# Patient Record
Sex: FEMALE | Race: WHITE | NOT HISPANIC OR LATINO | Employment: UNEMPLOYED | URBAN - METROPOLITAN AREA
[De-identification: names, ages, dates, MRNs, and addresses within clinical notes are randomized per-mention and may not be internally consistent; named-entity substitution may affect disease eponyms.]

---

## 2018-05-14 ENCOUNTER — OFFICE VISIT (OUTPATIENT)
Dept: URGENT CARE | Facility: CLINIC | Age: 43
End: 2018-05-14
Payer: COMMERCIAL

## 2018-05-14 ENCOUNTER — HOSPITAL ENCOUNTER (EMERGENCY)
Facility: HOSPITAL | Age: 43
Discharge: HOME | End: 2018-05-14
Attending: EMERGENCY MEDICINE
Payer: COMMERCIAL

## 2018-05-14 ENCOUNTER — APPOINTMENT (EMERGENCY)
Dept: RADIOLOGY | Facility: HOSPITAL | Age: 43
End: 2018-05-14
Payer: COMMERCIAL

## 2018-05-14 VITALS
HEART RATE: 61 BPM | TEMPERATURE: 97.9 F | RESPIRATION RATE: 18 BRPM | WEIGHT: 185 LBS | OXYGEN SATURATION: 99 % | BODY MASS INDEX: 28.04 KG/M2 | SYSTOLIC BLOOD PRESSURE: 102 MMHG | DIASTOLIC BLOOD PRESSURE: 61 MMHG | HEIGHT: 68 IN

## 2018-05-14 VITALS
HEIGHT: 68 IN | RESPIRATION RATE: 16 BRPM | OXYGEN SATURATION: 100 % | HEART RATE: 72 BPM | TEMPERATURE: 98.1 F | SYSTOLIC BLOOD PRESSURE: 106 MMHG | WEIGHT: 187 LBS | DIASTOLIC BLOOD PRESSURE: 65 MMHG | BODY MASS INDEX: 28.34 KG/M2

## 2018-05-14 DIAGNOSIS — R07.9 CHEST PAIN: Primary | ICD-10-CM

## 2018-05-14 DIAGNOSIS — R07.9 CHEST PAIN, UNSPECIFIED TYPE: Primary | ICD-10-CM

## 2018-05-14 LAB
ANION GAP SERPL CALCULATED.3IONS-SCNC: 9 MMOL/L (ref 4–13)
BASOPHILS # BLD AUTO: 0.02 THOUSANDS/ΜL (ref 0–0.1)
BASOPHILS NFR BLD AUTO: 1 % (ref 0–1)
BUN SERPL-MCNC: 17 MG/DL (ref 5–25)
CALCIUM SERPL-MCNC: 8.4 MG/DL (ref 8.3–10.1)
CHLORIDE SERPL-SCNC: 107 MMOL/L (ref 100–108)
CO2 SERPL-SCNC: 27 MMOL/L (ref 21–32)
CREAT SERPL-MCNC: 0.71 MG/DL (ref 0.6–1.3)
EOSINOPHIL # BLD AUTO: 0.09 THOUSAND/ΜL (ref 0–0.61)
EOSINOPHIL NFR BLD AUTO: 2 % (ref 0–6)
ERYTHROCYTE [DISTWIDTH] IN BLOOD BY AUTOMATED COUNT: 12.8 % (ref 11.6–15.1)
EXT PREG TEST URINE: NEGATIVE
GFR SERPL CREATININE-BSD FRML MDRD: 105 ML/MIN/1.73SQ M
GLUCOSE SERPL-MCNC: 86 MG/DL (ref 65–140)
HCT VFR BLD AUTO: 36 % (ref 34.8–46.1)
HGB BLD-MCNC: 11.8 G/DL (ref 11.5–15.4)
LYMPHOCYTES # BLD AUTO: 1.62 THOUSANDS/ΜL (ref 0.6–4.47)
LYMPHOCYTES NFR BLD AUTO: 39 % (ref 14–44)
MAGNESIUM SERPL-MCNC: 1.7 MG/DL (ref 1.6–2.6)
MCH RBC QN AUTO: 31.7 PG (ref 26.8–34.3)
MCHC RBC AUTO-ENTMCNC: 32.8 G/DL (ref 31.4–37.4)
MCV RBC AUTO: 97 FL (ref 82–98)
MONOCYTES # BLD AUTO: 0.33 THOUSAND/ΜL (ref 0.17–1.22)
MONOCYTES NFR BLD AUTO: 8 % (ref 4–12)
NEUTROPHILS # BLD AUTO: 2.09 THOUSANDS/ΜL (ref 1.85–7.62)
NEUTS SEG NFR BLD AUTO: 50 % (ref 43–75)
PLATELET # BLD AUTO: 183 THOUSANDS/UL (ref 149–390)
PMV BLD AUTO: 10.9 FL (ref 8.9–12.7)
POTASSIUM SERPL-SCNC: 3.5 MMOL/L (ref 3.5–5.3)
RBC # BLD AUTO: 3.72 MILLION/UL (ref 3.81–5.12)
SODIUM SERPL-SCNC: 143 MMOL/L (ref 136–145)
TROPONIN I SERPL-MCNC: <0.02 NG/ML
TROPONIN I SERPL-MCNC: <0.02 NG/ML
WBC # BLD AUTO: 4.15 THOUSAND/UL (ref 4.31–10.16)

## 2018-05-14 PROCEDURE — 71045 X-RAY EXAM CHEST 1 VIEW: CPT

## 2018-05-14 PROCEDURE — 36415 COLL VENOUS BLD VENIPUNCTURE: CPT | Performed by: EMERGENCY MEDICINE

## 2018-05-14 PROCEDURE — 93005 ELECTROCARDIOGRAM TRACING: CPT

## 2018-05-14 PROCEDURE — 80048 BASIC METABOLIC PNL TOTAL CA: CPT | Performed by: EMERGENCY MEDICINE

## 2018-05-14 PROCEDURE — 85025 COMPLETE CBC W/AUTO DIFF WBC: CPT | Performed by: EMERGENCY MEDICINE

## 2018-05-14 PROCEDURE — 83735 ASSAY OF MAGNESIUM: CPT | Performed by: EMERGENCY MEDICINE

## 2018-05-14 PROCEDURE — 99203 OFFICE O/P NEW LOW 30 MIN: CPT

## 2018-05-14 PROCEDURE — 99285 EMERGENCY DEPT VISIT HI MDM: CPT

## 2018-05-14 PROCEDURE — 84484 ASSAY OF TROPONIN QUANT: CPT | Performed by: EMERGENCY MEDICINE

## 2018-05-14 PROCEDURE — 81025 URINE PREGNANCY TEST: CPT | Performed by: EMERGENCY MEDICINE

## 2018-05-14 RX ORDER — ALBUTEROL SULFATE 2.5 MG/3ML
2.5 SOLUTION RESPIRATORY (INHALATION) EVERY 6 HOURS PRN
COMMUNITY

## 2018-05-14 RX ORDER — 0.9 % SODIUM CHLORIDE 0.9 %
3 VIAL (ML) INJECTION AS NEEDED
Status: DISCONTINUED | OUTPATIENT
Start: 2018-05-14 | End: 2018-05-14 | Stop reason: HOSPADM

## 2018-05-14 NOTE — ED PROVIDER NOTES
History  Chief Complaint   Patient presents with    Chest Pain     Pt c/o chest pain, SOB and left arm pain that started around 0830 this morning  43 yo F hx renal disorder, bariatric surgery, anxiety presenting with chest pain  Patient was getting her kids ready for school this morning when she felt a pressure in her central chest with pain radiating down her right arm  Also reports feeling short of breath and light headed with some blurry vision  Patient rates CP at 6/10 at its worst and now is largely resolved  She did not take anything for the pain  States she has never had similar episode in the past  Denies any recent heavy lifting or illness  History provided by:  Patient   used: No    Chest Pain   Pain location:  Substernal area  Pain quality: pressure    Pain radiates to:  R arm  Pain radiates to the back: no    Pain severity:  Mild  Onset quality:  Sudden  Timing:  Constant  Progression:  Partially resolved  Chronicity:  New  Context comment:  Started while she was getting her kids ready for school this morning  Relieved by:  Nothing  Worsened by:  Nothing tried  Ineffective treatments:  None tried  Associated symptoms: dizziness, nausea and shortness of breath    Associated symptoms: no abdominal pain, no altered mental status, no back pain, no cough, no fever, no headache, no numbness and not vomiting    Risk factors: surgery    Risk factors: no high cholesterol and no hypertension  Male sex: bariatric  Prior to Admission Medications   Prescriptions Last Dose Informant Patient Reported? Taking?    albuterol (2 5 mg/3 mL) 0 083 % nebulizer solution   Yes Yes   Sig: Take 2 5 mg by nebulization every 6 (six) hours as needed for wheezing      Facility-Administered Medications: None       Past Medical History:   Diagnosis Date    Asthma     GERD (gastroesophageal reflux disease) anxiety    Renal disorder        Past Surgical History:   Procedure Laterality Date    BARIATRIC SURGERY      CHOLECYSTECTOMY      OOPHORECTOMY         History reviewed  No pertinent family history  I have reviewed and agree with the history as documented  Social History   Substance Use Topics    Smoking status: Never Smoker    Smokeless tobacco: Never Used    Alcohol use No        Review of Systems   Constitutional: Negative for chills and fever  HENT: Negative  Eyes: Positive for visual disturbance  Respiratory: Positive for shortness of breath  Negative for cough  Cardiovascular: Positive for chest pain  Gastrointestinal: Positive for nausea  Negative for abdominal pain, diarrhea and vomiting  Genitourinary: Negative  Musculoskeletal: Negative for back pain  Skin: Negative for rash and wound  Neurological: Positive for dizziness  Negative for syncope, numbness and headaches  All other systems reviewed and are negative  Physical Exam  ED Triage Vitals [05/14/18 1114]   Temperature Pulse Respirations Blood Pressure SpO2   97 9 °F (36 6 °C) 64 18 107/60 100 %      Temp src Heart Rate Source Patient Position - Orthostatic VS BP Location FiO2 (%)   -- Monitor Lying Right arm --      Pain Score       1           Orthostatic Vital Signs  Vitals:    05/14/18 1114 05/14/18 1300   BP: 107/60 102/61   Pulse: 64 61   Patient Position - Orthostatic VS: Lying        Physical Exam   Constitutional: She is oriented to person, place, and time  She appears well-developed and well-nourished  No distress  HENT:   Head: Normocephalic and atraumatic  Mouth/Throat: Oropharynx is clear and moist    Eyes: EOM are normal  Pupils are equal, round, and reactive to light  Neck: Normal range of motion  Cardiovascular: Normal rate, regular rhythm and normal heart sounds  No murmur heard  Pulmonary/Chest: Effort normal and breath sounds normal  No respiratory distress  She has no wheezes  She exhibits no tenderness  Abdominal: Soft   Bowel sounds are normal  There is no tenderness  Musculoskeletal: Normal range of motion  She exhibits no edema  Neurological: She is alert and oriented to person, place, and time  Skin: Skin is warm and dry  No rash noted  She is not diaphoretic  Psychiatric: She has a normal mood and affect  Nursing note and vitals reviewed  ED Medications  Medications - No data to display    Diagnostic Studies  Results Reviewed     Procedure Component Value Units Date/Time    Troponin I [[id number]]  (Normal) Collected:  05/14/18 1412    Lab Status:  Final result Specimen:  Blood from Arm, Left Updated:  05/14/18 1443     Troponin I <0 02 ng/mL     Narrative:         [employer] Chemistry analyzer 99% cutoff is > 0 04 ng/mL in network labs    o cTnI 99% cutoff is useful only when applied to patients in the clinical setting of myocardial ischemia  o cTnI 99% cutoff should be interpreted in the context of clinical history, ECG findings and possibly cardiac imaging to establish correct diagnosis  o cTnI 99% cutoff may be suggestive but clearly not indicative of a coronary event without the clinical setting of myocardial ischemia  Troponin I [[id number]]  (Normal) Collected:  05/14/18 1221    Lab Status:  Final result Specimen:  Blood from Arm, Left Updated:  05/14/18 1316     Troponin I <0 02 ng/mL     Narrative:         [employer] Chemistry analyzer 99% cutoff is > 0 04 ng/mL in network labs    o cTnI 99% cutoff is useful only when applied to patients in the clinical setting of myocardial ischemia  o cTnI 99% cutoff should be interpreted in the context of clinical history, ECG findings and possibly cardiac imaging to establish correct diagnosis  o cTnI 99% cutoff may be suggestive but clearly not indicative of a coronary event without the clinical setting of myocardial ischemia      Basic metabolic panel [[id number]] Collected:  05/14/18 1221    Lab Status:  Final result Specimen:  Blood from Arm, Left Updated:  05/14/18 1306     Sodium 143 mmol/L      Potassium 3 5 mmol/L      Chloride 107 mmol/L      CO2 27 mmol/L      Anion Gap 9 mmol/L      BUN 17 mg/dL      Creatinine 0 71 mg/dL      Glucose 86 mg/dL      Calcium 8 4 mg/dL      eGFR 105 ml/min/1 73sq m     Narrative:         National Kidney Disease Education Program recommendations are as follows:  GFR calculation is accurate only with a steady state creatinine  Chronic Kidney disease less than 60 ml/min/1 73 sq  meters  Kidney failure less than 15 ml/min/1 73 sq  meters  Magnesium [[id number]]  (Normal) Collected:  05/14/18 1221    Lab Status:  Final result Specimen:  Blood from Arm, Left Updated:  05/14/18 1306     Magnesium 1 7 mg/dL     CBC and differential [[id number]]  (Abnormal) Collected:  05/14/18 1221    Lab Status:  Final result Specimen:  Blood from Arm, Left Updated:  05/14/18 1258     WBC 4 15 (L) Thousand/uL      RBC 3 72 (L) Million/uL      Hemoglobin 11 8 g/dL      Hematocrit 36 0 %      MCV 97 fL      MCH 31 7 pg      MCHC 32 8 g/dL      RDW 12 8 %      MPV 10 9 fL      Platelets 542 Thousands/uL      Neutrophils Relative 50 %      Lymphocytes Relative 39 %      Monocytes Relative 8 %      Eosinophils Relative 2 %      Basophils Relative 1 %      Neutrophils Absolute 2 09 Thousands/µL      Lymphocytes Absolute 1 62 Thousands/µL      Monocytes Absolute 0 33 Thousand/µL      Eosinophils Absolute 0 09 Thousand/µL      Basophils Absolute 0 02 Thousands/µL     POCT pregnancy, urine [[id number]]  (Normal) Resulted:  05/14/18 1207    Lab Status:  Final result Updated:  05/14/18 1207     EXT PREG TEST UR (Ref: Negative) NEGATIVE                 XR chest portable   ED Interpretation by George Hart DO (05/14 1238)   No acute abnl      Final Result by Padmini Florian MD (05/14 1238)      No acute cardiopulmonary disease              Workstation performed: [id number]                    Procedures  ECG 12 Lead Documentation  Date/Time: 5/14/2018 11:20 AM  Performed by: Pina Fulton by: Belle Haynes Indications / Diagnosis:  Chest pain  ECG reviewed by me, the ED Provider: yes    Patient location:  ED  Previous ECG:     Previous ECG:  Unavailable  Interpretation:     Interpretation: normal    Rate:     ECG rate:  57    ECG rate assessment: bradycardic    Rhythm:     Rhythm: sinus bradycardia    Ectopy:     Ectopy: none    QRS:     QRS axis:  Normal    QRS intervals:  Normal  Conduction:     Conduction: normal    ST segments:     ST segments:  Normal  T waves:     T waves: normal             Phone Contacts  ED Phone Contact    ED Course  ED Course as of May 14 2103   Mon May 14, 2018   1213 Patient with sternal chest pain pressure and right arm pain 20 minute duration  Associated nausea    Has had low potassium in the past           HEART Risk Score      Most Recent Value   History  0 Filed at: 05/14/2018 1145   ECG  0 Filed at: 05/14/2018 1145   Age  0 Filed at: 05/14/2018 1145   Risk Factors  0 Filed at: 05/14/2018 1145   Troponin  0 Filed at: 05/14/2018 1145   Heart Score Risk Calculator   History  0 Filed at: 05/14/2018 1145   ECG  0 Filed at: 05/14/2018 1145   Age  0 Filed at: 05/14/2018 1145   Risk Factors  0 Filed at: 05/14/2018 1145   Troponin  0 Filed at: 05/14/2018 1145   HEART Score  0 Filed at: 05/14/2018 1145   HEART Score  0 Filed at: 05/14/2018 1145                            [facility]  Number of Diagnoses or Management Options  Chest pain: new and requires workup     Amount and/or Complexity of Data Reviewed  Clinical lab tests: ordered and reviewed  Tests in the radiology section of CPT®: ordered and reviewed    Patient Progress  Patient progress: improved    CritCare Time    Disposition  Final diagnoses:   Chest pain     Time reflects when diagnosis was documented in both MDM as applicable and the Disposition within this note     Time User Action Codes Description Comment    5/14/2018  2:54 PM Tauna Friendly Add [R07 9] Chest pain       ED Disposition     ED Disposition Condition Comment Discharge  [address] [facility] discharge to home/self care  Condition at discharge: Good        Follow-up Information     Follow up With Specialties Details Why Contact Info    Obie Lowry MD   As needed [place]  [address]  [facility] [address] [phone]          Discharge Medication List as of 5/14/2018  2:55 PM      CONTINUE these medications which have NOT CHANGED    Details   albuterol (2 5 mg/3 mL) 0 083 % nebulizer solution Take 2 5 mg by nebulization every 6 (six) hours as needed for wheezing, Historical Med             Outpatient Discharge Orders  Stress test only, exercise   Standing Status: Future  Standing Exp   Date: 05/14/22         ED Provider  Electronically Signed by           Vineet Nelson DO  05/14/18 2108

## 2018-05-14 NOTE — PROGRESS NOTES
[facility]        NAME: Josey Miller is a 44 y o  female  : 1975    MRN: [id number]  DATE: May 14, 2018  TIME: 11:35 AM    Assessment and Plan   Chest pain, unspecified type [R07 9]  1  Chest pain, unspecified type           Patient Instructions       Follow up with PCP in 3-5 days  Proceed to  ER if symptoms worsen  Chief Complaint     Chief Complaint   Patient presents with    Chest Pain     8:30 this morning chest tightness, pressure on her chest, right arm pain  lasted 15 minutes         History of Present Illness       Pt reports chest with radiation to right arm that started at 815 am and lasted at 10/10 for aprox 10 mins  Continues to have dull achy pain with rad to right arm at 1-2 /10 now   Denies sob, diaphoresis or n/v at this time  States it felt like gas but wasn't  Hx of gerd and anxiety but states it did not feel like either of those  Review of Systems   Review of Systems   Constitutional: Negative  Negative for diaphoresis, fatigue and fever  HENT: Negative  Negative for congestion  Eyes: Negative  Negative for discharge and redness  Respiratory: Negative  Negative for shortness of breath and wheezing  Cardiovascular: Positive for chest pain  Gastrointestinal: Negative  Negative for abdominal pain  Endocrine: Negative  Negative for cold intolerance  Genitourinary: Negative  Musculoskeletal: Negative  Negative for back pain and gait problem  Skin: Negative  Negative for color change  Neurological: Negative  Negative for dizziness and weakness  Psychiatric/Behavioral: Negative  Negative for agitation, behavioral problems and confusion           Current Medications       Current Outpatient Prescriptions:     albuterol (2 5 mg/3 mL) 0 083 % nebulizer solution, Take 2 5 mg by nebulization every 6 (six) hours as needed for wheezing, Disp: , Rfl:     Current Allergies     Allergies as of 2018 - Reviewed 2018   Allergen Reaction Noted    Adhesive [medical tape] Blisters 05/14/2018    Morphine Chest Pain 05/14/2018    Nsaids  05/14/2018    Sulfacetamide Hives 05/14/2018    Lovenox [enoxaparin] Rash 05/14/2018            The following portions of the patient's history were reviewed and updated as appropriate: allergies, current medications, past family history, past medical history, past social history, past surgical history and problem list      Past Medical History:   Diagnosis Date    Asthma     Renal disorder        Past Surgical History:   Procedure Laterality Date    BARIATRIC SURGERY      CHOLECYSTECTOMY      OOPHORECTOMY         No family history on file  Medications have been verified  Objective   /65   Pulse 72   Temp 98 1 °F (36 7 °C)   Resp 16   Ht 5' 8" (1 727 m)   Wt 84 8 kg (187 lb)   SpO2 100%   BMI 28 43 kg/m²        Physical Exam     Physical Exam   Constitutional: She is oriented to person, place, and time  She appears well-developed and well-nourished  HENT:   Head: Normocephalic and atraumatic  Eyes: Conjunctivae and EOM are normal  Pupils are equal, round, and reactive to light  Right eye exhibits no discharge  Left eye exhibits no discharge  Neck: Normal range of motion  Neck supple  No thyromegaly present  Cardiovascular: Normal rate, regular rhythm and normal heart sounds  Exam reveals no gallop and no friction rub  No murmur heard  Pt reports m id sternal chest pain with radiaiton to right arm that continues at this time  1-2 /10  Constant  Pulmonary/Chest: Effort normal and breath sounds normal  No respiratory distress  Abdominal: Soft  Bowel sounds are normal  She exhibits no distension and no mass  There is no tenderness  There is no rebound and no guarding  Musculoskeletal: Normal range of motion  Neurological: She is alert and oriented to person, place, and time  Skin: Skin is warm  She is not diaphoretic  No pallor     Psychiatric: She has a normal mood and affect  Her behavior is normal  Judgment and thought content normal    Nursing note and vitals reviewed

## 2018-05-14 NOTE — PATIENT INSTRUCTIONS
Chest Pain   WHAT YOU NEED TO KNOW:   Pt to go directly to the er for evaluation  Chest pain can be caused by a range of conditions, from not serious to life-threatening  Chest pain can be a symptom of a digestive problem, such as acid reflux or a stomach ulcer  An anxiety attack or a strong emotion, such as anger, can also cause chest pain  Infection, inflammation, or a fracture in the bones or cartilage in your chest can cause pain or discomfort  Sometimes chest pain or pressure is caused by poor blood flow to your heart (angina)  Chest pain may also be caused by life-threatening conditions such as a heart attack or blood clot in your lungs  DISCHARGE INSTRUCTIONS:   Call 911 if:   · You have any of the following signs of a heart attack:      ¨ Squeezing, pressure, or pain in your chest that lasts longer than 5 minutes or returns    ¨ Discomfort or pain in your back, neck, jaw, stomach, or arm     ¨ Trouble breathing    ¨ Nausea or vomiting    ¨ Lightheadedness or a sudden cold sweat, especially with chest pain or trouble breathing    Return to the emergency department if:   · You have chest discomfort that gets worse, even with medicine  · You cough or vomit blood  · Your bowel movements are black or bloody  · You cannot stop vomiting, or it hurts to swallow  Contact your healthcare provider if:   · You have questions or concerns about your condition or care  Medicines:   · Medicines  may be given to treat the cause of your chest pain  Examples include pain medicine, anxiety medicine, or medicines to increase blood flow to your heart  · Do not take certain medicines without asking your healthcare provider first   These include NSAIDs, herbal or vitamin supplements, or hormones (estrogen or progestin)  · Take your medicine as directed  Contact your healthcare provider if you think your medicine is not helping or if you have side effects   Tell him or her if you are allergic to any medicine  Keep a list of the medicines, vitamins, and herbs you take  Include the amounts, and when and why you take them  Bring the list or the pill bottles to follow-up visits  Carry your medicine list with you in case of an emergency  Follow up with your healthcare provider within 72 hours, or as directed: You may need to return for more tests to find the cause of your chest pain  You may be referred to a specialist, such as a cardiologist or gastroenterologist  Write down your questions so you remember to ask them during your visits  Healthy living tips: The following are general healthy guidelines  If your chest pain is caused by a heart problem, your healthcare provider will give you specific guidelines to follow  · Do not smoke  Nicotine and other chemicals in cigarettes and cigars can cause lung and heart damage  Ask your healthcare provider for information if you currently smoke and need help to quit  E-cigarettes or smokeless tobacco still contain nicotine  Talk to your healthcare provider before you use these products  · Eat a variety of healthy, low-fat foods  Healthy foods include fruits, vegetables, whole-grain breads, low-fat dairy products, beans, lean meats, and fish  Ask for more information about a heart healthy diet  · Ask about activity  Your healthcare provider will tell you which activities to limit or avoid  Ask when you can drive, return to work, and have sex  Ask about the best exercise plan for you  · Maintain a healthy weight  Ask your healthcare provider how much you should weigh  Ask him or her to help you create a weight loss plan if you are overweight  · Get the flu and pneumonia vaccines  All adults should get the influenza (flu) vaccine  Get it every year as soon as it becomes available  The pneumococcal vaccine is given to adults aged 72 years or older  The vaccine is given every 5 years to prevent pneumococcal disease, such as pneumonia    © 2017 [employer] [address] is for End User's use only and may not be sold, redistributed or otherwise used for commercial purposes  All illustrations and images included in CareNotes® are the copyrighted property of [employer] M [employer] Inc  or [employer]  The above information is an  only  It is not intended as medical advice for individual conditions or treatments  Talk to your doctor, nurse or pharmacist before following any medical regimen to see if it is safe and effective for you

## 2018-05-14 NOTE — DISCHARGE INSTRUCTIONS
Chest Pain   WHAT YOU NEED TO KNOW:   Chest pain can be caused by a range of conditions, from not serious to life-threatening  Chest pain can be a symptom of a digestive problem, such as acid reflux or a stomach ulcer  An anxiety attack or a strong emotion, such as anger, can also cause chest pain  Infection, inflammation, or a fracture in the bones or cartilage in your chest can cause pain or discomfort  Sometimes chest pain or pressure is caused by poor blood flow to your heart (angina)  Chest pain may also be caused by life-threatening conditions such as a heart attack or blood clot in your lungs  DISCHARGE INSTRUCTIONS:   Call 911 if:   · You have any of the following signs of a heart attack:      ¨ Squeezing, pressure, or pain in your chest that lasts longer than 5 minutes or returns    ¨ Discomfort or pain in your back, neck, jaw, stomach, or arm     ¨ Trouble breathing    ¨ Nausea or vomiting    ¨ Lightheadedness or a sudden cold sweat, especially with chest pain or trouble breathing    Return to the emergency department if:   · You have chest discomfort that gets worse, even with medicine  · You cough or vomit blood  · Your bowel movements are black or bloody  · You cannot stop vomiting, or it hurts to swallow  Contact your healthcare provider if:   · You have questions or concerns about your condition or care  Medicines:   · Medicines  may be given to treat the cause of your chest pain  Examples include pain medicine, anxiety medicine, or medicines to increase blood flow to your heart  · Do not take certain medicines without asking your healthcare provider first   These include NSAIDs, herbal or vitamin supplements, or hormones (estrogen or progestin)  · Take your medicine as directed  Contact your healthcare provider if you think your medicine is not helping or if you have side effects  Tell him or her if you are allergic to any medicine   Keep a list of the medicines, vitamins, and herbs you take  Include the amounts, and when and why you take them  Bring the list or the pill bottles to follow-up visits  Carry your medicine list with you in case of an emergency  Follow up with your healthcare provider within 72 hours, or as directed: You may need to return for more tests to find the cause of your chest pain  You may be referred to a specialist, such as a cardiologist or gastroenterologist  Write down your questions so you remember to ask them during your visits  Healthy living tips: The following are general healthy guidelines  If your chest pain is caused by a heart problem, your healthcare provider will give you specific guidelines to follow  · Do not smoke  Nicotine and other chemicals in cigarettes and cigars can cause lung and heart damage  Ask your healthcare provider for information if you currently smoke and need help to quit  E-cigarettes or smokeless tobacco still contain nicotine  Talk to your healthcare provider before you use these products  · Eat a variety of healthy, low-fat foods  Healthy foods include fruits, vegetables, whole-grain breads, low-fat dairy products, beans, lean meats, and fish  Ask for more information about a heart healthy diet  · Ask about activity  Your healthcare provider will tell you which activities to limit or avoid  Ask when you can drive, return to work, and have sex  Ask about the best exercise plan for you  · Maintain a healthy weight  Ask your healthcare provider how much you should weigh  Ask him or her to help you create a weight loss plan if you are overweight  · Get the flu and pneumonia vaccines  All adults should get the influenza (flu) vaccine  Get it every year as soon as it becomes available  The pneumococcal vaccine is given to adults aged 72 years or older  The vaccine is given every 5 years to prevent pneumococcal disease, such as pneumonia    © 2017 [employer] Information is for End User's use only and may not be sold, redistributed or otherwise used for commercial purposes  All illustrations and images included in CareNotes® are the copyrighted property of A D A M , Inc  or [employer]  The above information is an  only  It is not intended as medical advice for individual conditions or treatments  Talk to your doctor, nurse or pharmacist before following any medical regimen to see if it is safe and effective for you

## 2018-05-15 LAB
ATRIAL RATE: 57 BPM
ATRIAL RATE: 69 BPM
P AXIS: 44 DEGREES
P AXIS: 51 DEGREES
PR INTERVAL: 138 MS
PR INTERVAL: 146 MS
QRS AXIS: 32 DEGREES
QRS AXIS: 67 DEGREES
QRSD INTERVAL: 82 MS
QRSD INTERVAL: 84 MS
QT INTERVAL: 398 MS
QT INTERVAL: 400 MS
QTC INTERVAL: 387 MS
QTC INTERVAL: 428 MS
T WAVE AXIS: 16 DEGREES
T WAVE AXIS: 6 DEGREES
VENTRICULAR RATE: 57 BPM
VENTRICULAR RATE: 69 BPM

## 2018-05-15 PROCEDURE — 93010 ELECTROCARDIOGRAM REPORT: CPT | Performed by: INTERNAL MEDICINE

## 2020-09-07 ENCOUNTER — APPOINTMENT (OUTPATIENT)
Dept: RADIOLOGY | Facility: CLINIC | Age: 45
End: 2020-09-07
Payer: COMMERCIAL

## 2020-09-07 ENCOUNTER — OFFICE VISIT (OUTPATIENT)
Dept: URGENT CARE | Facility: CLINIC | Age: 45
End: 2020-09-07
Payer: COMMERCIAL

## 2020-09-07 VITALS
OXYGEN SATURATION: 100 % | SYSTOLIC BLOOD PRESSURE: 113 MMHG | RESPIRATION RATE: 16 BRPM | DIASTOLIC BLOOD PRESSURE: 68 MMHG | BODY MASS INDEX: 27.13 KG/M2 | HEART RATE: 80 BPM | HEIGHT: 69 IN | WEIGHT: 183.2 LBS

## 2020-09-07 DIAGNOSIS — S99.921A FOOT INJURY, RIGHT, INITIAL ENCOUNTER: ICD-10-CM

## 2020-09-07 DIAGNOSIS — M79.671 RIGHT FOOT PAIN: Primary | ICD-10-CM

## 2020-09-07 PROCEDURE — 99213 OFFICE O/P EST LOW 20 MIN: CPT | Performed by: PHYSICIAN ASSISTANT

## 2020-09-07 PROCEDURE — 73630 X-RAY EXAM OF FOOT: CPT

## 2020-09-07 NOTE — PATIENT INSTRUCTIONS
Motrin Tylenol as needed for pain  Follow-up with Podiatry  Follow up with PCP in 3-5 days  Proceed to  ER if symptoms worsen  Metatarsalgia   AMBULATORY CARE:   Metatarsalgia  is pain in the ball of your foot, near your second, third, and fourth toes  Common signs and symptoms of metatarsalgia:  Symptoms usually develop over time, but you may have sudden pain from an injury  You may have any of the following:  · Pain at the ball of your foot or near your toes that gets worse when you walk or stand, especially on hard surfaces    · Pain during exercises such as running    · Sharp or shooting pain in your toes that may get worse when you flex your toes    · Tingling or numbness in your toes    · Feeling like you are walking over rocks, or that you have a bruise    · A change in the way you walk because you try to avoid putting pressure on the ball of your foot  Contact your healthcare provider if:   · You develop knee, back, or hip pain  · You have more pain or redness in the foot  · You have questions or concerns about your condition or care  Treatment:  The cause of your metatarsalgia will be treated, if possible  You may also need any of the following:  · NSAIDs , such as ibuprofen, help decrease swelling, pain, and fever  This medicine is available with or without a doctor's order  NSAIDs can cause stomach bleeding or kidney problems in certain people  If you take blood thinner medicine, always ask if NSAIDs are safe for you  Always read the medicine label and follow directions  Do not give these medicines to children under 10months of age without direction from your child's healthcare provider  · Ultrasound  may be used to relieve your pain  Sound waves from the ultrasound can help send heat deeper into your tissues  · A steroid injection  may help decrease inflammation  · Surgery  may be needed if other treatments do not work  Surgery is used to align the bones near your toes   You may also need surgery to fix a problem such as hammertoe  Manage or prevent metatarsalgia:   · Rest your foot  If you play sports, you may not be able to do weight-bearing exercises  Examples include swimming and bike riding  Ask your healthcare provider which exercises are safe for you  · Apply ice as directed  Ice helps reduce pain and swelling  Use an ice pack, or put crushed ice in a plastic bag  Cover the pack or bag with a towel before you apply it to your foot  Apply ice for 15 to 20 minutes every hour, or as directed  · Use a cane or crutch if directed  These devices may help take pressure off your foot while it heals  · Wear proper shoes  Do not wear shoes that are narrow or tight  You may need to wear shoes that are wider than you usually wear  Choose shoes that do not have a raised heel  Shock-absorbing shoes can help prevent injury  These shoes will have extra support under your feet and toes  You can also add shoe cushions inside your shoes or to the bottoms of your feet, near your toes  The cushions may provide more support and make walking or standing more comfortable  Arch supports may help take pressure off your toes  · Reach or maintain a healthy weight  Extra weight can put pressure on your feet  Talk to your healthcare provider about a healthy weight for you  Your provider can help you create a safe weight loss plan if you are overweight  · Go to physical therapy if directed  A physical therapist can help improve your strength and range of motion  The therapist can also help you improve the way you walk to prevent metatarsalgia from happening again  Your therapist can also teach you exercises to help relieve your pain  Follow up with your healthcare provider as directed:  Write down your questions so you remember to ask them during your visits     © 2017 [employer] Information is for End User's use only and may not be sold, redistributed or otherwise used for commercial purposes  All illustrations and images included in CareNotes® are the copyrighted property of [employer]  or [employer]  The above information is an  only  It is not intended as medical advice for individual conditions or treatments  Talk to your doctor, nurse or pharmacist before following any medical regimen to see if it is safe and effective for you

## 2020-09-07 NOTE — PROGRESS NOTES
[facility]        NAME: Fernando Cook is a 41 y o  female  : 1975    MRN: [id number]  DATE: 2020  TIME: 12:50 PM    Assessment and Plan   Right foot pain [M79 671]  1  Right foot pain  XR foot 3+ vw right    Ambulatory referral to Podiatry         Patient Instructions     Motrin Tylenol as needed for pain  Follow-up with Podiatry  Follow up with PCP in 3-5 days  Proceed to  ER if symptoms worsen  Chief Complaint     Chief Complaint   Patient presents with    Foot Pain     Pt fell through deck at home 2020  Was seen in  for other injuries  Foot pain progressively worsening pain, 2/10 @ rest, 10/10 wgt bearing  R lateral foot, lump present  History of Present Illness       60-year-old female presents with right foot pain  Patient reports an injury that occurred back and July this year from falling through a deck which she was seen at urgent care  Is having some pain then but progressively gotten worse over the past several months  Patient reports when she is weight-bearing it is 10/10 pain  When she is nonweightbearing to at 10 pain  Reports most the pain is on her right lateral foot area  Denies any numbness or tingling  Ankle Pain    The incident occurred more than 1 week ago  The incident occurred at home  The injury mechanism was a fall  The pain is present in the right foot  The quality of the pain is described as aching  The pain is moderate  The pain has been fluctuating since onset  Pertinent negatives include no inability to bear weight, loss of motion, loss of sensation, muscle weakness, numbness or tingling  She reports no foreign bodies present  Nothing aggravates the symptoms  She has tried nothing for the symptoms  The treatment provided no relief  Review of Systems   Review of Systems   Constitutional: Negative  HENT: Negative  Respiratory: Negative  Cardiovascular: Negative  Gastrointestinal: Negative      Musculoskeletal: Negative  Skin: Negative  Neurological: Negative  Negative for tingling and numbness  Current Medications       Current Outpatient Medications:     albuterol (2 5 mg/3 mL) 0 083 % nebulizer solution, Take 2 5 mg by nebulization every 6 (six) hours as needed for wheezing, Disp: , Rfl:     Current Allergies     Allergies as of 09/07/2020 - Reviewed 09/07/2020   Allergen Reaction Noted    Adhesive [medical tape] Blisters 05/14/2018    Morphine Chest Pain 05/14/2018    Nsaids  05/14/2018    Sulfacetamide Hives 05/14/2018    Lovenox [enoxaparin] Rash 05/14/2018            The following portions of the patient's history were reviewed and updated as appropriate: allergies, current medications, past family history, past medical history, past social history, past surgical history and problem list      Past Medical History:   Diagnosis Date    Asthma     GERD (gastroesophageal reflux disease) anxiety    Renal disorder        Past Surgical History:   Procedure Laterality Date    BARIATRIC SURGERY      CHOLECYSTECTOMY      ELBOW SURGERY      OOPHORECTOMY         History reviewed  No pertinent family history  Medications have been verified  Objective   /68   Pulse 80   Resp 16   Ht 5' 9" (1 753 m)   Wt 83 1 kg (183 lb 3 2 oz)   SpO2 100%   BMI 27 05 kg/m²        Physical Exam     Physical Exam  Vitals signs and nursing note reviewed  Constitutional:       General: She is not in acute distress  Appearance: She is well-developed  HENT:      Head: Normocephalic and atraumatic  Right Ear: External ear normal       Left Ear: External ear normal       Nose: Nose normal       Mouth/Throat:      Pharynx: No oropharyngeal exudate  Eyes:      General:         Right eye: No discharge  Left eye: No discharge  Conjunctiva/sclera: Conjunctivae normal    Neck:      Musculoskeletal: Normal range of motion and neck supple     Pulmonary:      Effort: Pulmonary effort is normal  No respiratory distress  Musculoskeletal: Normal range of motion  Right foot: Normal range of motion and normal capillary refill  Tenderness and bony tenderness present  No swelling, crepitus, deformity or laceration  Feet:    Skin:     General: Skin is warm and dry  Neurological:      Mental Status: She is alert and oriented to person, place, and time

## 2020-11-30 ENCOUNTER — NURSE TRIAGE (OUTPATIENT)
Dept: OTHER | Facility: OTHER | Age: 45
End: 2020-11-30

## 2021-05-01 ENCOUNTER — HOSPITAL ENCOUNTER (EMERGENCY)
Facility: HOSPITAL | Age: 46
Discharge: HOME | End: 2021-05-01
Attending: EMERGENCY MEDICINE | Admitting: EMERGENCY MEDICINE
Payer: COMMERCIAL

## 2021-05-01 ENCOUNTER — APPOINTMENT (EMERGENCY)
Dept: RADIOLOGY | Facility: HOSPITAL | Age: 46
End: 2021-05-01
Payer: COMMERCIAL

## 2021-05-01 VITALS
HEART RATE: 72 BPM | DIASTOLIC BLOOD PRESSURE: 58 MMHG | BODY MASS INDEX: 26.58 KG/M2 | RESPIRATION RATE: 16 BRPM | OXYGEN SATURATION: 99 % | TEMPERATURE: 97.1 F | SYSTOLIC BLOOD PRESSURE: 91 MMHG | WEIGHT: 180 LBS

## 2021-05-01 DIAGNOSIS — M79.672 LEFT FOOT PAIN: Primary | ICD-10-CM

## 2021-05-01 PROCEDURE — 96372 THER/PROPH/DIAG INJ SC/IM: CPT

## 2021-05-01 PROCEDURE — 73630 X-RAY EXAM OF FOOT: CPT

## 2021-05-01 PROCEDURE — 73610 X-RAY EXAM OF ANKLE: CPT

## 2021-05-01 PROCEDURE — 99283 EMERGENCY DEPT VISIT LOW MDM: CPT

## 2021-05-01 PROCEDURE — 99284 EMERGENCY DEPT VISIT MOD MDM: CPT | Performed by: EMERGENCY MEDICINE

## 2021-05-01 RX ORDER — KETOROLAC TROMETHAMINE 30 MG/ML
15 INJECTION, SOLUTION INTRAMUSCULAR; INTRAVENOUS ONCE
Status: COMPLETED | OUTPATIENT
Start: 2021-05-01 | End: 2021-05-01

## 2021-05-01 RX ORDER — ACETAMINOPHEN 325 MG/1
975 TABLET ORAL ONCE
Status: COMPLETED | OUTPATIENT
Start: 2021-05-01 | End: 2021-05-01

## 2021-05-01 RX ADMIN — ACETAMINOPHEN 975 MG: 325 TABLET, FILM COATED ORAL at 22:29

## 2021-05-01 RX ADMIN — KETOROLAC TROMETHAMINE 15 MG: 30 INJECTION, SOLUTION INTRAMUSCULAR at 22:29

## 2021-05-02 NOTE — ED PROVIDER NOTES
History  Chief Complaint   Patient presents with    Ankle Injury     pt states she was wearing heels, lost her footing and fell down the stairs, twisting her left ankle/foot     Patient is a 39 yo F who presents with left foot pain that happened almost immediately prior to arrival  Patient states that she was wearing heels, walking down the stairs, lost her footing, her left foot went forwards and the top of the foot hit the step  She did not fall completely as she held onto the railing  Did not strike head/loose consciousness  Since the fall, has been complaining of pain to the top and inner aspect of the left foot, constant, moderate in intensity, non-radiating, worse with movement and signifnicantly worse with attempt at ambulation  Denies numbness, tingling, weakness  Prior to Admission Medications   Prescriptions Last Dose Informant Patient Reported? Taking? albuterol (2 5 mg/3 mL) 0 083 % nebulizer solution   Yes No   Sig: Take 2 5 mg by nebulization every 6 (six) hours as needed for wheezing      Facility-Administered Medications: None       Past Medical History:   Diagnosis Date    Asthma     GERD (gastroesophageal reflux disease) anxiety    Renal disorder        Past Surgical History:   Procedure Laterality Date    BARIATRIC SURGERY      CHOLECYSTECTOMY      ELBOW SURGERY      OOPHORECTOMY         History reviewed  No pertinent family history  I have reviewed and agree with the history as documented  E-Cigarette/Vaping    E-Cigarette Use Never User      E-Cigarette/Vaping Substances     Social History     Tobacco Use    Smoking status: Never Smoker    Smokeless tobacco: Never Used   Substance Use Topics    Alcohol use: No    Drug use: No       Review of Systems   Musculoskeletal: Positive for gait problem  Negative for back pain, neck pain and neck stiffness  Foot pain     Skin: Negative for color change and wound  Neurological: Negative for weakness and numbness  Physical Exam  Physical Exam  Vitals signs and nursing note reviewed  Constitutional:       General: She is not in acute distress  Appearance: Normal appearance  She is not ill-appearing, toxic-appearing or diaphoretic  HENT:      Head: Normocephalic and atraumatic  Mouth/Throat:      Mouth: Mucous membranes are moist    Eyes:      Conjunctiva/sclera: Conjunctivae normal       Pupils: Pupils are equal, round, and reactive to light  Neck:      Musculoskeletal: Neck supple  Cardiovascular:      Rate and Rhythm: Normal rate and regular rhythm  Pulses: Normal pulses  Heart sounds: Normal heart sounds  No murmur  Pulmonary:      Effort: Pulmonary effort is normal  No respiratory distress  Breath sounds: Normal breath sounds  No stridor  No wheezing, rhonchi or rales  Chest:      Chest wall: No tenderness  Abdominal:      General: Bowel sounds are normal  There is no distension  Palpations: Abdomen is soft  Tenderness: There is no abdominal tenderness  There is no guarding or rebound  Musculoskeletal:      Left knee: Normal  She exhibits normal range of motion, no swelling, no effusion, no ecchymosis, no deformity, no laceration and no erythema  No tenderness found  Left ankle: Normal  She exhibits normal range of motion, no swelling, no ecchymosis, no deformity, no laceration and normal pulse  No tenderness  No lateral malleolus, no medial malleolus, no AITFL, no CF ligament, no posterior TFL, no head of 5th metatarsal and no proximal fibula tenderness found  Achilles tendon normal       Right lower leg: No edema  Left lower leg: No edema  Left foot: Decreased range of motion  Normal capillary refill  Tenderness present  No bony tenderness, swelling, crepitus, deformity or laceration  Feet:       Comments: No midline c-t-l-spine tenderness, step offs, deformities  Pelvis stable    Skin:     General: Skin is warm and dry     Neurological: General: No focal deficit present  Mental Status: She is alert and oriented to person, place, and time  Mental status is at baseline  Psychiatric:         Mood and Affect: Mood normal          Behavior: Behavior normal          Vital Signs  ED Triage Vitals   Temperature Pulse Respirations Blood Pressure SpO2   05/01/21 2135 05/01/21 2130 05/01/21 2130 05/01/21 2130 05/01/21 2130   (!) 97 1 °F (36 2 °C) 72 16 91/58 99 %      Temp src Heart Rate Source Patient Position - Orthostatic VS BP Location FiO2 (%)   -- 05/01/21 2130 05/01/21 2130 05/01/21 2130 --    Monitor Sitting Right arm       Pain Score       --                  Vitals:    05/01/21 2130   BP: 91/58   Pulse: 72   Patient Position - Orthostatic VS: Sitting         Visual Acuity      ED Medications  Medications   acetaminophen (TYLENOL) tablet 975 mg (975 mg Oral Given 5/1/21 2229)   ketorolac (TORADOL) injection 15 mg (15 mg Intramuscular Given 5/1/21 2229)       Diagnostic Studies  Results Reviewed     None                 XR foot 3+ views LEFT   Final Result by Damari Hammond DO (05/01 2241)      No acute osseous abnormality  If symptoms persist repeat x-ray in 7-10 days should be obtained to evaluate for occult fracture            Workstation performed: [id number]         XR ankle 3+ views LEFT   Final Result by Damari Hammond DO (05/01 2238)      No acute osseous abnormality  If symptoms persist repeat x-ray in 7-10 days should be obtained to evaluate for occult fracture            Workstation performed: [id number]                    Procedures  Procedures         ED Course                                           MDM  Number of Diagnoses or Management Options  Left foot pain:   Diagnosis management comments: Assessment and Plan:   39 yo F presenting with left foot pain consistent with contusion/sprain from trauma  XR negative for acute fracture   Discussed ace wrap, surgical shoe, crutches, weight bearing as tolerated, rest, ice, elevation, tylenol/motrin for pain relief, and follow up with orthopedics  Given strict return precauitions which patient verbalized understanding of  Disposition  Final diagnoses:   Left foot pain     Time reflects when diagnosis was documented in both MDM as applicable and the Disposition within this note     Time User Action Codes Description Comment    5/1/2021 10:48 PM Marylin Danger Add [V35 377] Right foot pain     5/1/2021 10:48 PM Lucas Gooden [W43 484] Right foot pain     5/1/2021 10:48 PM Marylin Danger Add [U47 510] Left foot pain       ED Disposition     ED Disposition Condition Date/Time Comment    Discharge Stable Sat May 1, 2021 10:48 PM [facility]  LIBERTAS discharge to home/self care  Follow-up Information     Follow up With Specialties Details Why Contact Info Additional [phone] Surgery Schedule an appointment as soon as possible for a visit in 1 week for re-evaluation [phone] [facility] [phone]  [address] [place] [address], [address] [place] [address],  [address]     [address] Emergency Department Emergency Medicine Go to  As needed, If symptoms worsen, for re-evaluation [address] [place] [address], [phone]  [address] [facility] Emergency Department, [address] [place] [address], [facility] [address]          Discharge Medication List as of 5/1/2021 10:55 PM      CONTINUE these medications which have NOT CHANGED    Details   albuterol (2 5 mg/3 mL) 0 083 % nebulizer solution Take 2 5 mg by nebulization every 6 (six) hours as needed for wheezing, Historical Med           No discharge procedures on file      PDMP Review     None          ED Provider  Electronically Signed by           Hesham Michelle DO  05/02/21 1342

## 2021-05-02 NOTE — DISCHARGE INSTRUCTIONS
Please take tylenol and motrin for pain relief  Keep ace wrap on for support  Rest, ice, elevate  Follow up with orthopedics in 1 week for re-evaluation  If your pain persists, you might need repeat imaging for occult fracture  Return to the emergency department for the following, but not limited to worsening pain, numbness, tingling, weakness, foot feels cold, turns blue

## 2021-05-06 ENCOUNTER — OFFICE VISIT (OUTPATIENT)
Dept: OBGYN CLINIC | Facility: CLINIC | Age: 46
End: 2021-05-06
Payer: COMMERCIAL

## 2021-05-06 VITALS
DIASTOLIC BLOOD PRESSURE: 80 MMHG | SYSTOLIC BLOOD PRESSURE: 118 MMHG | WEIGHT: 180 LBS | HEIGHT: 69 IN | BODY MASS INDEX: 26.66 KG/M2

## 2021-05-06 DIAGNOSIS — S93.429A SPRAIN OF DELTOID LIGAMENT OF ANKLE, INITIAL ENCOUNTER: Primary | ICD-10-CM

## 2021-05-06 PROCEDURE — 99243 OFF/OP CNSLTJ NEW/EST LOW 30: CPT | Performed by: ORTHOPAEDIC SURGERY

## 2021-05-06 NOTE — PATIENT INSTRUCTIONS
You have sprained your ankle  Over the next few weeks, it is important to follow these instructions to prevent long term complications from this sprain  1  Begin bearing weight immediately in the boot  The boot is for your comfort only  As soon as you are comfortable in a sneaker, you should transition out of the boot  You should transition from the boot no later than 2 weeks from today  5/20/21    2  Wear the boot at all times in the acute phase of the injury  This includes to sleep at night  You can remove it to ice the ankle and to shower but should otherwise wear it  Once your swelling and pain begin to improve, you may remove the boot to sleep at night  You must try your best to be out of the boot into a sneaker by 2 weeks from now at the latest     3  Begin PT immediately  4  Obtain a compression stocking  Knee high (20-30mm Hg pressure)  Wear this at all times while awake to help control swelling  5  NSAIDs as needed for anti-inflammatory effects  You have sprained your ankle  Over the next 4 weeks it is important you follow these instructions;     Lateral Ankle Sprain Protocol - [facility]  Acute Phase: Days 1-3  Goals: Decrease pain and swelling, protect from further injury  · Pain and swelling management (RICE)  · Protection of injured ligaments (activity modification, supportive sneakers, occasionally a boot is necessary for a week or two at most)  · Gait-WBAT  Sub-Acute Phase: 2-4 days to 2 weeks  Goals: Decrease/eliminate pain, increase ROM, decrease swelling, increase strength  · Continue pain and swelling management  · Subtalar and talocrurcal joint mobilizations  · ROM with pain-free range: DF/PF/EV/IV AROM, calf stretching  · Isometric strengthening  Rehabilitative Phase: 2-6 weeks  Goals: Regain ROM and strength, increase endurance and proprioception  · Continue joint mobilizations and stretching  · Progress to pain-free concentric and eccentric strengthening exercises (both open chain and closed chain)  · Proprioception exercises (balance board, BAPS board, single leg stance etc )  · Gait training-promote equal weight beraing and weaning of assistive devices  · Endurance activities (stationary biking, swimming, walking, etc )  Functional Phase: 6 weeks  Goals: Return to full activity and function  · Continue strengthening exercises  · Coordination and agility training-depends on patient's prior level of function, recreational activities, and goals         Treating your Sprained Ankle  Treating your sprained ankle properly may prevent chronic pain and instability  For a Grade I sprain, follow the R I C E  guidelines:    · Rest your ankle by not walking on it  Limit weight bearing  Use crutches if necessary; if there is no fracture you are safe to put some weight on the leg  An ankle brace often helps control swelling and adds stability while the ligaments are healing  · Ice it to keep down the swelling  Don't put ice directly on the skin (use a thin piece of cloth such as a pillow case between the ice bag and the skin) and don't ice more than 20 minutes at a time to avoid frost bite  · Compression can help control swelling as well as immobilize and support your injury  · Elevate the foot by reclining and propping it up above the waist or heart as needed  Swelling usually goes down with a few days  For a Grade II sprain, follow the R I C E  guidelines and allow more time for healing  A doctor may immobilize or splint your sprained ankle  A Grade III sprain puts you at risk for permanent ankle instability  Rarely, surgery may be needed to repair the damage, especially in competitive athletes  For severe ankle sprains, your doctor may also consider treating you with a short leg cast for two to three weeks or a walking boot  People who sprain their ankle repeatedly may also need surgical repair to tighten their ligaments    Rehabilitating your Sprained Ankle  Every ligament injury needs rehabilitation  Otherwise, your sprained ankle might not heal completely and you might re-injure it  All ankle sprains, from mild to severe, require three phases of recovery:    · Phase I includes resting, protecting and reducing swelling of your injured ankle  · Phase II includes restoring your ankle's flexibility, range of motion and strength  · Phase III includes gradually returning to straight-ahead activity and doing maintenance exercises, followed later by more cutting sports such as tennis, basketball or football  Once you can stand on your ankle again, your doctor will prescribe exercise routines to strengthen your muscles and ligaments and increase your flexibility, balance and coordination  Later, you may walk, jog and run figure eights with your ankle taped or in a supportive ankle brace  It's important to complete the rehabilitation program because it makes it less likely that you'll hurt the same ankle again  If you don't complete rehabilitation, you could suffer chronic pain, instability and arthritis in your ankle  If your ankle still hurts, it could mean that the sprained ligament has not healed right, or that some other injury also happened  To prevent future sprained ankles, pay attention to your body's warning signs to slow down when you feel pain or fatigue, and stay in shape with good muscle balance, flexibility and strength in your soft tissues  Ankle Sprain     What is an ankle sprain? An ankle sprain refers to tearing of the ligaments of the ankle  The most common ankle sprain occurs on the lateral or outside part of the ankle  This is an extremely common injury which affects many people during a wide variety of activities  It can happen in the setting of an ankle fracture (i e  when the bones of the ankle also break)  Most commonly, however, it occurs in isolation  What are the symptoms an ankle sprain?   Patients report pain after having twisted an ankle  This usually occurs due to an inversion injury, which means the foot rolls underneath the ankle or leg  It commonly occurs during sports  Patients will complain of pain on the outside of their ankle and various degrees of swelling and bleeding under the skin (i e  bruising)  Technically, this bruising is referred to as ecchymosis  Depending on the severity of the sprain, a person may or may not be able to put weight on the foot  What are the risk factors for an ankle sprain? As noted above, these injuries occur when the ankle is twisted underneath the leg, called inversion  Risk factors are those activities, such as basketball and jumping sports, in which an athlete can come down on and turn the ankle or step on an opponent's foot  Some people are predisposed to ankle sprains  In people with a hindfoot varus, which means that the general nature or posture of the heels is slightly turned toward the inside, these injuries are more common  This is because it is easier to turn on the ankle  In those who have had a severe sprain in the past, it is also easier to turn the ankle and cause a new sprain  Therefore, one of the risk factors of spraining the ankle is having instability  Those who have weak muscles, especially those called the peroneals which run along the outside of the ankle, may be more predisposed  Anatomy    There are multiple ligaments in the ankle  Ligaments in general are those structures that connect bone-to-bone  Tendons, on the other hand, connect muscle-to-bone and allow those muscles to exert their force  In the case of an ankle sprain, there are several commonly sprained ligaments  The two most important are the followin The ATFL or anterior talofibular ligament, which connects the talus to the fibula on the outside of the ankle  2 The CFL or calcaneal fibular ligament, which connects the fibula to the calcaneus below         3  Finally, there is a third ligament which is not as commonly torn  It runs more in the back of the ankle and is called the PTFL or posterior talofibular ligament  These must be differentiated from the so-called high ankle sprain ligaments, which are completely different and located higher up the leg  How is an ankle sprain diagnosed? Ankle sprains can be diagnosed fairly easily given that they are common injuries  The location of pain on the outside of the ankle with tenderness and swelling in a patient who has an ankle with inversion is very suggestive  In these patients, normal X-rays also suggest that the bone has not been broken and instead the ankle ligaments have been torn or sprained  It is very important, however, not to simply regard any injury as an ankle sprain because other injuries can occur as well  For example, the peroneal tendons mentioned above can be torn  There can also be fractures in other bones around the ankle including the fifth metatarsal and the anterior process of the calcaneus  In very severe cases, an MRI may be warranted to rule out other problems in the ankle such as damage to the cartilage  An MRI typically is not necessary to diagnose a sprain  What are treatment options? Surgery is not required in the vast majority of ankle sprains  Even in severe sprains, these ligaments will heal without surgery  The grade of the sprain will dictate treatment  Sprains are traditionally classified into several grades  Perhaps more important, however, is the patients ability to bear weight  Those that can bear weight even after the injury are likely to return very quickly to play  Those who cannot walk may need to be immobilized  In general, treatment in the first 48 to 72 hours consists of resting the ankle, icing 20 minutes every two to three hours, compressing with an ACE wrap, and elevating, which means positioning the leg and ankle so that the toes are above the level of patients nose   Those patients who cannot bear weight are better treated in a removable walking boot until they can comfortably bear weight  Physical therapy is a mainstay  Patients should learn to strengthen the muscles around the ankle, particularly the peroneals  An ankle brace can be used in an athlete until a therapist believes that the ankle is strong enough to return to play without it  Surgery is rarely indicated but may be needed in a patient who has cartilage damage or other related injuries  Ligaments are only repaired or strengthened in cases of chronic instability in which the ligaments have healed but not in a strong fashion  How long is recovery? Recovery depends on the severity of the injury  As noted above, for those minor injuries, people can return to their activities in sports within several days  For very severe sprains, it may take longer and up to several weeks  It should be noted that high ankle sprains take considerably longer to heal      Outcomes for ankle sprains are generally quite good  Most patients heal from an ankle sprain and are able to get back to their normal lives, sports and activities  Some people, however, who do not properly rehab their ankle and have a rather severe sprain may go on to have ankle instability  Chronic instability occurs in patients repeatedly spraining the ankle  Such repeated episodes can be dangerous because they can lead to damage within the ankle  These patients should be identified and considered for repair  Potential Complications    Surgery is rarely needed  As noted above, however, an improperly rehabbed ankle may end up having chronic instability  It is important to address this with either therapy or surgery before further damage occurs to the ankle  Frequently Asked Questions    What is a high ankle sprain and is that different from a regular ankle sprain?      A high ankle sprain refers to tearing of the ligaments that connect the tibia to the fibula (this connection is also called the syndesmosis)  These are different and much less common than the standard lateral ankle sprains, meaning those that occur on the side of the ankle  Do ankle sprains ever need to be repaired acutely? Ankle sprains rarely, if ever, needed to be treated with surgery  The vast majority simply need to be treated with rest, ice, compression and elevation followed by physical therapy and temporary bracing  I have sprained my ankle many times  Should I be concerned? Yes  The more you sprain an ankle, the greater the chance that problems will develop  For example, turning the ankle can lead to damage to the cartilage inside the ankle joint  You should see your doctor if this is occurring

## 2021-05-06 NOTE — PROGRESS NOTES
YAHIR Hair  Attending, Orthopaedic Surgery  Foot and [facility]      Assessment:     Encounter Diagnosis   Name Primary?  Sprain of deltoid ligament of ankle, initial encounter Yes          Plan:     The patient has sustained a sprain of the left Deltoid Ligament   We will begin physical therapy as soon as the patient tolerates- Order placed  Instructions for Home stretching program provided in AVSS  Instructions given for rest, ice 20mins/hr, elevation, and Ace wrap given for compression  The patient was prescribed a CAM boot to be worn at all times until comfort to be weightbearing as tolerated in a supportive sneaker  Begin Pt   Work/School restrictions given      Follow up will be in 7 weeks  Zayda Lassiter MD          Subjective:    Chief Complaint:  Left ankle pain     Janna Alonzo is a 40 y o  female referred by PCP for evaluation and treatment of an injury to the left ankle  This is evaluated as a personal injury  The injury occurred 5 days ago, and occurred while walking down the stairs in heels and twisted her left ankle  The patient states the ankle rolled inward at the time of injury  She did not hear or sense a pop or snap at the time of the injury  The patient notes pain and moderate swelling of the ankle since the injury  She has treated the ankle with ice, Elevation, Rest  Pain is localized to the medial  malleolar area  She has not sprained this ankle in the past     Pain/symptom location: the left lower leg  Pain/symptom quality: aching  Pain/symptom severity: constant  Pain/symptom timing:  Worse during the day when active  Pain/symptom conext:  Worse with activites and work  Pain/symptom modifying factors:  Rest makes better, activities make worse  Pain/symptom associated signs/symptoms: none    Outside reports reviewed: ER records      Foot and Ankle Surgical History:   See Below    Past Medical, Surgical and Social History:  Past Medical History:  has a past medical history of Asthma, GERD (gastroesophageal reflux disease) (anxiety), and Renal disorder  Problem List: does not have any pertinent problems on file  Past Surgical History:  has a past surgical history that includes Bariatric Surgery; Cholecystectomy; Oophorectomy; and Elbow surgery  Family History: family history is not on file  Social History:  reports that she has never smoked  She has never used smokeless tobacco  She reports that she does not drink alcohol or use drugs  Current Medications: has a current medication list which includes the following prescription(s): albuterol  Allergies: is allergic to adhesive [medical tape]; morphine; nsaids; sulfacetamide; and lovenox [enoxaparin]  Review of Systems:  General- denies fever/chills  HEENT- denies hearing loss or sore throat  Eyes- denies eye pain or visual disturbances, denies red eyes  Respiratory- denies cough or SOB  Cardio- denies chest pain or palpitations  GI- denies abdominal pain  Endocrine- denies urinary frequency  Urinary- denies pain with urination  Musculoskeletal- Negative except noted above  Skin- denies rashes or wounds  Neurological- denies dizziness or headache  Psychiatric- denies anxiety or difficulty concentrating    Objective:        /80   Ht 5' 9" (1 753 m)   Wt 81 6 kg (180 lb)   BMI 26 58 kg/m²   General/Constitutional: No apparent distress: well-nourished and well developed  Eyes: normal ocular motion  Lymphatic: No appreciable lymphadenopathy  Respiratory: Non-labored breathing  Vascular: No edema, swelling or tenderness, except as noted in detailed exam   Integumentary: No impressive skin lesions present, except as noted in detailed exam   Neuro: No ataxia or abnormal movements  Psych: Normal mood and affect, oriented to person, place and time  MSK: normal other than stated in HPI and exam      Gait:  Antalgic  The patient can bear weight on the injured extremity     Left Ankle  Proximal Fibula:   no tenderness noted   Edema: Moderate swelling circumferentially in the ankle   Ecchymosis:   Moderate in lateral ankle   Crepitus  None   Active ROM:  50% of normal    Passive ROM:   75% of normal    Palpation:  Significant tenderness of the anterolateral ligaments   Stability :   No joint laxity  Drawer sign equal to unaffected ankle  Syndosmosis:   syndesmotic ligament IS NOT tender   Sensation:     Intact in all distributions   Pulses:  normal DP and PT pulses       Imaging  X-ray of the ankle/foot: 3 views of the ankle reveal a stable mortise joint, moderate soft tissue swelling, and no evidence of fracture  Reviewed by me personally  I personally performed this service      Scribe Attestation    I,:  Fela Trujillo MA am acting as a scribe while in the presence of the attending physician :       I,:  Elisabeth Gutierrez MD personally performed the services described in this documentation    as scribed in my presence :

## 2021-06-24 ENCOUNTER — OFFICE VISIT (OUTPATIENT)
Dept: OBGYN CLINIC | Facility: CLINIC | Age: 46
End: 2021-06-24
Payer: COMMERCIAL

## 2021-06-24 VITALS
HEIGHT: 69 IN | BODY MASS INDEX: 26.66 KG/M2 | WEIGHT: 180 LBS | DIASTOLIC BLOOD PRESSURE: 70 MMHG | SYSTOLIC BLOOD PRESSURE: 124 MMHG

## 2021-06-24 DIAGNOSIS — S86.112A POSTERIOR TIBIAL TENDON TEAR, TRAUMATIC, LEFT, INITIAL ENCOUNTER: Primary | ICD-10-CM

## 2021-06-24 DIAGNOSIS — S93.429A SPRAIN OF DELTOID LIGAMENT OF ANKLE, INITIAL ENCOUNTER: ICD-10-CM

## 2021-06-24 PROCEDURE — 99213 OFFICE O/P EST LOW 20 MIN: CPT | Performed by: ORTHOPAEDIC SURGERY

## 2021-06-24 NOTE — PROGRESS NOTES
YAHIR Ferrer  Attending, Orthopaedic Surgery  Foot and [facility] [address] [facility]      ORTHOPAEDIC FOOT AND ANKLE CLINIC VISIT     Assessment:     Encounter Diagnoses   Name Primary?  Sprain of deltoid ligament of ankle, initial encounter     Posterior tibial tendon tear, traumatic, left, initial encounter Yes            Plan:   · The patient verbalized understanding of exam findings and treatment plan  We engaged in the shared decision-making process and treatment options were discussed at length with the patient  Surgical and conservative management discussed today along with risks and benefits  · Zabrina has 50% improvement in symptoms, however she is significantly tender over the posterior tibial tendon with a positive anterior drawer test    · MRI was ordered to evaluate posterior tibial tendon tear  · New script for PT was provided  · Continue ice, elevation, and NSAID's as needed for pain  · Compression stocking as needed for swelling control  Return for After MRI  History of Present Illness:   Chief Complaint: left ankle follow up    Real William is a 40 y o  female who is being seen in follow-up for left deltoid ligament sprain  When we last saw she we recommended WBAT in supportive sneaker, PT for ROM and strengthening, and compression stocking for swelling  Pain has  improved  Residual pain is localized at medial malleolus and arch  with minimal radiating and described as sharp and severe        Pain/symptom timing:  Worse during the day when active  Pain/symptom context:  Worse with activites and work  Pain/symptom modifying factors:  Rest makes better, activities make worse  Pain/symptom associated signs/symptoms: none    Prior treatment   · NSAIDsYes   · Injections No   · Bracing/Orthotics No    · Physical Therapy Yes     Orthopedic Surgical History:   See Below    Past Medical, Surgical and Social History:  Past Medical History:  has a past medical history of Asthma, GERD (gastroesophageal reflux disease) (anxiety), and Renal disorder  Problem List: does not have any pertinent problems on file  Past Surgical History:  has a past surgical history that includes Bariatric Surgery; Cholecystectomy; Oophorectomy; and Elbow surgery  Family History: family history is not on file  Social History:  reports that she has never smoked  She has never used smokeless tobacco  She reports that she does not drink alcohol and does not use drugs  Current Medications: has a current medication list which includes the following prescription(s): albuterol  Allergies: is allergic to adhesive [medical tape], morphine, nsaids, sulfacetamide, and lovenox [enoxaparin]  Review of Systems:  General- denies fever/chills  HEENT- denies hearing loss or sore throat  Eyes- denies eye pain or visual disturbances, denies red eyes  Respiratory- denies cough or SOB  Cardio- denies chest pain or palpitations  GI- denies abdominal pain  Endocrine- denies urinary frequency  Urinary- denies pain with urination  Musculoskeletal- Negative except noted above  Skin- denies rashes or wounds  Neurological- denies dizziness or headache  Psychiatric- denies anxiety or difficulty concentrating    Physical Exam:   /70   Ht 5' 9" (1 753 m)   Wt 81 6 kg (180 lb)   BMI 26 58 kg/m²   General/Constitutional: No apparent distress: well-nourished and well developed  Eyes: normal ocular motion  Lymphatic: No appreciable lymphadenopathy  Respiratory: Non-labored breathing  Vascular: No edema, swelling or tenderness, except as noted in detailed exam   Integumentary: No impressive skin lesions present, except as noted in detailed exam   Neuro: No ataxia or tremors noted  Psych: Normal mood and affect, oriented to person, place and time  Appropriate affect  Musculoskeletal: Normal, except as noted in detailed exam and in HPI      Examination    Left    Gait Normal   Musculoskeletal Tender to palpation at Medial malleolus    Skin Normal       Nails Normal    Range of Motion  10 degrees dorsiflexion, 30 degrees plantarflexion  Subtalar motion: normal    Stability Stable    Muscle Strength 5/5 tibialis anterior  5/5 gastrocnemius-soleus  5/5 posterior tibialis  5/5 peroneal/eversion strength  5/5 EHL  5/5 FHL    Neurologic Normal    Sensation  Intact to light touch throughout sural, saphenous, superficial peroneal, deep peroneal and medial/lateral plantar nerve distributions  Mill[place]-Jane 5 07 filament (10g) testing deferred  Cardiovascular Brisk capillary refill < 2 seconds,intact DP and PT pulses    Special Tests Anterior drawer test positive      Imaging Studies:   No new imaging      James R Lachman, MD  Foot & Ankle Surgery   Department of [address] [place] [address]      I personally performed the service  Billi Lane Lachman, MD    Scribe Attestation    I,:  Roberto Ritter MA am acting as a scribe while in the presence of the attending physician :       I,:  Satish Maurer MD personally performed the services described in this documentation    as scribed in my presence :

## 2021-07-12 ENCOUNTER — HOSPITAL ENCOUNTER (OUTPATIENT)
Dept: MRI IMAGING | Facility: HOSPITAL | Age: 46
Discharge: HOME | End: 2021-07-12
Attending: ORTHOPAEDIC SURGERY
Payer: COMMERCIAL

## 2021-07-12 DIAGNOSIS — S86.112A POSTERIOR TIBIAL TENDON TEAR, TRAUMATIC, LEFT, INITIAL ENCOUNTER: ICD-10-CM

## 2021-07-12 PROCEDURE — 73721 MRI JNT OF LWR EXTRE W/O DYE: CPT

## 2021-07-12 PROCEDURE — G1004 CDSM NDSC: HCPCS

## 2021-07-22 ENCOUNTER — OFFICE VISIT (OUTPATIENT)
Dept: OBGYN CLINIC | Facility: CLINIC | Age: 46
End: 2021-07-22
Payer: COMMERCIAL

## 2021-07-22 VITALS
HEIGHT: 69 IN | SYSTOLIC BLOOD PRESSURE: 125 MMHG | WEIGHT: 180 LBS | BODY MASS INDEX: 26.66 KG/M2 | DIASTOLIC BLOOD PRESSURE: 70 MMHG

## 2021-07-22 DIAGNOSIS — S92.255A CLOSED NONDISPLACED FRACTURE OF NAVICULAR BONE OF LEFT FOOT, INITIAL ENCOUNTER: Primary | ICD-10-CM

## 2021-07-22 PROCEDURE — 99213 OFFICE O/P EST LOW 20 MIN: CPT | Performed by: ORTHOPAEDIC SURGERY

## 2021-07-22 NOTE — PROGRESS NOTES
YAHIR Brown  Attending, Orthopaedic Surgery  [facility] [address] [facility]      ORTHOPAEDIC FOOT AND ANKLE CLINIC VISIT     Assessment:     Encounter Diagnosis   Name Primary?  Closed nondisplaced fracture of navicular bone of left foot, initial encounter Yes            Plan:   · The patient verbalized understanding of exam findings and treatment plan  We engaged in the shared decision-making process and treatment options were discussed at length with the patient  Surgical and conservative management discussed today along with risks and benefits  · Reviewed MRI with the patient demonstrating intact posterior tibialis tendon however there is a nondisplaced navicular fracture  · Recommended patient wears CAM boot which she states she has at home  · Continue PT  · Discussed surgical option of Kidner procedure if continued pain  · XRAY at follow up  Return in about 6 weeks (around 9/2/2021)  History of Present Illness:   Chief Complaint: Left medial ankle pain  Jake Montiel is a 40 y o  female who is being seen in follow-up for left medial foot and ankle pain  When we last saw she we recommended an MRI to evaluate the posterior tibialis tendon  Pain has not improved  Residual pain is localized at the instep extending behind the posterior malleolus with minimal radiating and described as sharp and severe  Pain/symptom timing:  Worse during the day when active  Pain/symptom context:  Worse with activites and work  Pain/symptom modifying factors:  Rest makes better, activities make worse  Pain/symptom associated signs/symptoms: none    Prior treatment   · NSAIDsYes   · Injections No   · Bracing/Orthotics Yes    · Physical Therapy Yes     Orthopedic Surgical History:   See below    Past Medical, Surgical and Social History:  Past Medical History:  has a past medical history of Asthma, GERD (gastroesophageal reflux disease) (anxiety), and Renal disorder    Problem List: does not have any pertinent problems on file  Past Surgical History:  has a past surgical history that includes Bariatric Surgery; Cholecystectomy; Oophorectomy; and Elbow surgery  Family History: family history is not on file  Social History:  reports that she has never smoked  She has never used smokeless tobacco  She reports that she does not drink alcohol and does not use drugs  Current Medications: has a current medication list which includes the following prescription(s): albuterol  Allergies: is allergic to adhesive [medical tape], morphine, nsaids, sulfacetamide, and lovenox [enoxaparin]  Review of Systems:  General- denies fever/chills  HEENT- denies hearing loss or sore throat  Eyes- denies eye pain or visual disturbances, denies red eyes  Respiratory- denies cough or SOB  Cardio- denies chest pain or palpitations  GI- denies abdominal pain  Endocrine- denies urinary frequency  Urinary- denies pain with urination  Musculoskeletal- Negative except noted above  Skin- denies rashes or wounds  Neurological- denies dizziness or headache  Psychiatric- denies anxiety or difficulty concentrating    Physical Exam:   /70   Ht 5' 9" (1 753 m)   Wt 81 6 kg (180 lb)   BMI 26 58 kg/m²   General/Constitutional: No apparent distress: well-nourished and well developed  Eyes: normal ocular motion  Lymphatic: No appreciable lymphadenopathy  Respiratory: Non-labored breathing  Vascular: No edema, swelling or tenderness, except as noted in detailed exam   Integumentary: No impressive skin lesions present, except as noted in detailed exam   Neuro: No ataxia or tremors noted  Psych: Normal mood and affect, oriented to person, place and time  Appropriate affect  Musculoskeletal: Normal, except as noted in detailed exam and in HPI      Examination    Left    Gait                Antalgic   Musculoskeletal Tender to palpation at navicular    Skin Normal       Nails Normal    Range of Motion  15 degrees dorsiflexion, 30 degrees plantarflexion  Subtalar motion: normal    Stability Stable    Muscle Strength 5/5 tibialis anterior  5/5 gastrocnemius-soleus  5/5 posterior tibialis  5/5 peroneal/eversion strength  5/5 EHL  5/5 FHL    Neurologic Normal    Sensation Intact to light touch throughout sural, saphenous, superficial peroneal, deep peroneal and medial/lateral plantar nerve distributions  [place]-Jane 5 07 filament (10g) testing deferred  Cardiovascular Brisk capillary refill < 2 seconds,intact DP and PT pulses    Special Tests None      Imaging Studies:     MRI available for review of left ankle/heel done 7/12/2021 which demonstrates intact posterior tibialis tendon  There is a nondisplaced navicular fracture   Reviewed by me personally  Melony Locke Lachman, MD  Foot & Ankle Surgery   Department of [address] [place] [address]      I personally performed the service  Melony Locke Lachman, MD    Scribe Attestation    I,:  Jenelle Cooney MA am acting as a scribe while in the presence of the attending physician :       I,:  Rachel Frausto MD personally performed the services described in this documentation    as scribed in my presence :

## 2021-09-02 ENCOUNTER — OFFICE VISIT (OUTPATIENT)
Dept: OBGYN CLINIC | Facility: CLINIC | Age: 46
End: 2021-09-02
Payer: COMMERCIAL

## 2021-09-02 ENCOUNTER — APPOINTMENT (OUTPATIENT)
Dept: RADIOLOGY | Facility: CLINIC | Age: 46
End: 2021-09-02
Payer: COMMERCIAL

## 2021-09-02 VITALS
SYSTOLIC BLOOD PRESSURE: 115 MMHG | DIASTOLIC BLOOD PRESSURE: 62 MMHG | WEIGHT: 180 LBS | HEIGHT: 69 IN | BODY MASS INDEX: 26.66 KG/M2

## 2021-09-02 DIAGNOSIS — S92.255A CLOSED NONDISPLACED FRACTURE OF NAVICULAR BONE OF LEFT FOOT, INITIAL ENCOUNTER: ICD-10-CM

## 2021-09-02 DIAGNOSIS — S92.255A CLOSED NONDISPLACED FRACTURE OF NAVICULAR BONE OF LEFT FOOT, INITIAL ENCOUNTER: Primary | ICD-10-CM

## 2021-09-02 DIAGNOSIS — S93.429A SPRAIN OF DELTOID LIGAMENT OF ANKLE, INITIAL ENCOUNTER: ICD-10-CM

## 2021-09-02 PROCEDURE — 73630 X-RAY EXAM OF FOOT: CPT

## 2021-09-02 PROCEDURE — 99213 OFFICE O/P EST LOW 20 MIN: CPT | Performed by: ORTHOPAEDIC SURGERY

## 2021-09-02 NOTE — PROGRESS NOTES
YAHIR Castillo  Attending, Orthopaedic Surgery  [facility] [address] [facility]      ORTHOPAEDIC FOOT AND ANKLE CLINIC VISIT     Assessment:     Encounter Diagnoses   Name Primary?  Closed nondisplaced fracture of navicular bone of left foot, initial encounter Yes    Sprain of deltoid ligament of ankle, initial encounter             Plan:   · The patient verbalized understanding of exam findings and treatment plan  We engaged in the shared decision-making process and treatment options were discussed at length with the patient  Surgical and conservative management discussed today along with risks and benefits  · Zabrina has discontinued the cam boot 2 weeks ago and has discontinued PT 2 weeks ago  · On Physical exam she demonstrates pain with inversion resistance and is tender to palpation  · Surgical intervention was again discussed which includes a Kidner procedure and Brostrom, but she continues to see a very gradual improvement  There is not rush to surgery, the surgery would be the same now or 2 months from now  If she continues to progress, she may be able to avoid the surgery  If she hits a wall and stops improving, she may benefit from the above procedure  Return in about 8 weeks (around 10/28/2021) for Recheck  History of Present Illness:   Chief Complaint: left ankle follow up     Melissa Cutler is a 40 y o  female who is being seen in follow-up for left closed nondisplaced fracture of navicular bone  When we last saw she we recommended WBAT in cam boot and continuation of PT  If pain continues then discussed Kidner procedure  Patient notes improvement in symptoms  She also notes PT noted weakness of the ankle  She notes while walking she feels the foot is turning inward  Pain has  improved  Residual pain is localized at navicular with minimal radiating and described as sharp and severe        Pain/symptom timing:  Worse during the day when active  Pain/symptom context:  Worse with activites and work  Pain/symptom modifying factors:  Rest makes better, activities make worse  Pain/symptom associated signs/symptoms: none    Prior treatment   · NSAIDsYes   · Injections No   · Bracing/Orthotics Yes    · Physical Therapy Yes     Orthopedic Surgical History:   See Below    Past Medical, Surgical and Social History:  Past Medical History:  has a past medical history of Asthma, GERD (gastroesophageal reflux disease) (anxiety), and Renal disorder  Problem List: does not have any pertinent problems on file  Past Surgical History:  has a past surgical history that includes Bariatric Surgery; Cholecystectomy; Oophorectomy; and Elbow surgery  Family History: family history is not on file  Social History:  reports that she has never smoked  She has never used smokeless tobacco  She reports that she does not drink alcohol and does not use drugs  Current Medications: has a current medication list which includes the following prescription(s): albuterol  Allergies: is allergic to adhesive [medical tape], morphine, nsaids, sulfacetamide, and lovenox [enoxaparin]  Review of Systems:  General- denies fever/chills  HEENT- denies hearing loss or sore throat  Eyes- denies eye pain or visual disturbances, denies red eyes  Respiratory- denies cough or SOB  Cardio- denies chest pain or palpitations  GI- denies abdominal pain  Endocrine- denies urinary frequency  Urinary- denies pain with urination  Musculoskeletal- Negative except noted above  Skin- denies rashes or wounds  Neurological- denies dizziness or headache  Psychiatric- denies anxiety or difficulty concentrating    Physical Exam:   /62   Ht 5' 9" (1 753 m)   Wt 81 6 kg (180 lb)   BMI 26 58 kg/m²   General/Constitutional: No apparent distress: well-nourished and well developed    Eyes: normal ocular motion  Lymphatic: No appreciable lymphadenopathy  Respiratory: Non-labored breathing  Vascular: No edema, swelling or tenderness, except as noted in detailed exam   Integumentary: No impressive skin lesions present, except as noted in detailed exam   Neuro: No ataxia or tremors noted  Psych: Normal mood and affect, oriented to person, place and time  Appropriate affect  Musculoskeletal: Normal, except as noted in detailed exam and in HPI  Examination    Left    Gait Normal   Musculoskeletal Tender to palpation at navicular    Skin Normal     Nails Normal    Range of Motion  Unable to assess degrees dorsiflexion, unable to assess degrees plantarflexion  Subtalar motion: unable to assess    Stability Stable    Muscle Strength 5/5 tibialis anterior  5/5 gastrocnemius-soleus  5/5 posterior tibialis  5/5 peroneal/eversion strength  5/5 EHL  5/5 FHL    Neurologic Normal    Sensation  Intact to light touch throughout sural, saphenous, superficial peroneal, deep peroneal and medial/lateral plantar nerve distributions  [place]-Jane 5 07 filament (10g) testing  deferred  Cardiovascular Brisk capillary refill < 2 seconds,intact DP and PT pulses    Special Tests None      Imaging Studies:   3 views of the Left foot were obtained, reviewed and interpreted independently which demonstrate stable alignment of fracture with interval healing  Reviewed by me personally  Valarie Chain Lachman, MD  Foot & Ankle Surgery   Department of [address] [place] [address]      I personally performed the service  Valarie Chain Lachman, MD    Scribe Attestation    I,:  Greta Pham MA am acting as a scribe while in the presence of the attending physician :       I,:  Kathryn Coulter MD personally performed the services described in this documentation    as scribed in my presence :

## 2021-11-05 ENCOUNTER — PREP FOR PROCEDURE (OUTPATIENT)
Dept: OBGYN CLINIC | Facility: CLINIC | Age: 46
End: 2021-11-05

## 2021-11-05 ENCOUNTER — OFFICE VISIT (OUTPATIENT)
Dept: OBGYN CLINIC | Facility: CLINIC | Age: 46
End: 2021-11-05
Payer: COMMERCIAL

## 2021-11-05 VITALS
DIASTOLIC BLOOD PRESSURE: 68 MMHG | SYSTOLIC BLOOD PRESSURE: 98 MMHG | BODY MASS INDEX: 26.66 KG/M2 | WEIGHT: 180 LBS | HEIGHT: 69 IN

## 2021-11-05 DIAGNOSIS — S92.255S CLOSED NONDISPLACED FRACTURE OF NAVICULAR BONE OF LEFT FOOT, SEQUELA: Primary | ICD-10-CM

## 2021-11-05 DIAGNOSIS — Z01.812 PRE-OPERATIVE LABORATORY EXAMINATION: ICD-10-CM

## 2021-11-05 DIAGNOSIS — M25.372 ANKLE INSTABILITY, LEFT: ICD-10-CM

## 2021-11-05 PROCEDURE — 99213 OFFICE O/P EST LOW 20 MIN: CPT | Performed by: ORTHOPAEDIC SURGERY

## 2021-11-05 RX ORDER — CEFAZOLIN SODIUM 1 G/50ML
1000 SOLUTION INTRAVENOUS ONCE
Status: CANCELLED | OUTPATIENT
Start: 2021-12-27 | End: 2021-11-05

## 2021-11-05 RX ORDER — CHLORHEXIDINE GLUCONATE 4 G/100ML
SOLUTION TOPICAL DAILY PRN
Status: CANCELLED | OUTPATIENT
Start: 2021-12-27

## 2021-12-10 ENCOUNTER — APPOINTMENT (OUTPATIENT)
Dept: LAB | Facility: HOSPITAL | Age: 46
End: 2021-12-10
Attending: ORTHOPAEDIC SURGERY
Payer: COMMERCIAL

## 2021-12-10 DIAGNOSIS — Z01.812 PRE-OPERATIVE LABORATORY EXAMINATION: ICD-10-CM

## 2021-12-10 DIAGNOSIS — S92.255S CLOSED NONDISPLACED FRACTURE OF NAVICULAR BONE OF LEFT FOOT, SEQUELA: ICD-10-CM

## 2021-12-10 LAB
ANION GAP SERPL CALCULATED.3IONS-SCNC: 3 MMOL/L (ref 4–13)
BASOPHILS # BLD AUTO: 0.03 THOUSANDS/ΜL (ref 0–0.1)
BASOPHILS NFR BLD AUTO: 1 % (ref 0–1)
BUN SERPL-MCNC: 15 MG/DL (ref 5–25)
CALCIUM SERPL-MCNC: 9.4 MG/DL (ref 8.3–10.1)
CHLORIDE SERPL-SCNC: 109 MMOL/L (ref 100–108)
CO2 SERPL-SCNC: 28 MMOL/L (ref 21–32)
CREAT SERPL-MCNC: 0.73 MG/DL (ref 0.6–1.3)
EOSINOPHIL # BLD AUTO: 0.1 THOUSAND/ΜL (ref 0–0.61)
EOSINOPHIL NFR BLD AUTO: 2 % (ref 0–6)
ERYTHROCYTE [DISTWIDTH] IN BLOOD BY AUTOMATED COUNT: 12.9 % (ref 11.6–15.1)
GFR SERPL CREATININE-BSD FRML MDRD: 100 ML/MIN/1.73SQ M
GLUCOSE P FAST SERPL-MCNC: 83 MG/DL (ref 65–99)
HCT VFR BLD AUTO: 39.4 % (ref 34.8–46.1)
HGB BLD-MCNC: 12.4 G/DL (ref 11.5–15.4)
IMM GRANULOCYTES # BLD AUTO: 0 THOUSAND/UL (ref 0–0.2)
IMM GRANULOCYTES NFR BLD AUTO: 0 % (ref 0–2)
LYMPHOCYTES # BLD AUTO: 2.14 THOUSANDS/ΜL (ref 0.6–4.47)
LYMPHOCYTES NFR BLD AUTO: 50 % (ref 14–44)
MCH RBC QN AUTO: 30.4 PG (ref 26.8–34.3)
MCHC RBC AUTO-ENTMCNC: 31.5 G/DL (ref 31.4–37.4)
MCV RBC AUTO: 97 FL (ref 82–98)
MONOCYTES # BLD AUTO: 0.4 THOUSAND/ΜL (ref 0.17–1.22)
MONOCYTES NFR BLD AUTO: 9 % (ref 4–12)
NEUTROPHILS # BLD AUTO: 1.65 THOUSANDS/ΜL (ref 1.85–7.62)
NEUTS SEG NFR BLD AUTO: 38 % (ref 43–75)
NRBC BLD AUTO-RTO: 0 /100 WBCS
PLATELET # BLD AUTO: 192 THOUSANDS/UL (ref 149–390)
PMV BLD AUTO: 11.4 FL (ref 8.9–12.7)
POTASSIUM SERPL-SCNC: 3.8 MMOL/L (ref 3.5–5.3)
RBC # BLD AUTO: 4.08 MILLION/UL (ref 3.81–5.12)
SODIUM SERPL-SCNC: 140 MMOL/L (ref 136–145)
WBC # BLD AUTO: 4.32 THOUSAND/UL (ref 4.31–10.16)

## 2021-12-10 PROCEDURE — 36415 COLL VENOUS BLD VENIPUNCTURE: CPT

## 2021-12-10 PROCEDURE — 80048 BASIC METABOLIC PNL TOTAL CA: CPT

## 2021-12-10 PROCEDURE — 85025 COMPLETE CBC W/AUTO DIFF WBC: CPT

## 2021-12-20 ENCOUNTER — ANESTHESIA EVENT (OUTPATIENT)
Dept: PERIOP | Facility: HOSPITAL | Age: 46
End: 2021-12-20
Payer: COMMERCIAL

## 2021-12-20 RX ORDER — OMEPRAZOLE 40 MG/1
40 CAPSULE, DELAYED RELEASE ORAL DAILY
COMMUNITY

## 2021-12-20 RX ORDER — FAMOTIDINE 40 MG/1
40 TABLET, FILM COATED ORAL
COMMUNITY

## 2021-12-20 RX ORDER — ERGOCALCIFEROL 1.25 MG/1
50000 CAPSULE ORAL
COMMUNITY

## 2021-12-20 RX ORDER — MULTIVIT WITH MINERALS/LUTEIN
1000 TABLET ORAL DAILY
COMMUNITY

## 2021-12-20 RX ORDER — ZINC GLUCONATE 50 MG
100 TABLET ORAL DAILY
COMMUNITY

## 2021-12-20 NOTE — PRE-PROCEDURE INSTRUCTIONS
Pre-Surgery Instructions:   Medication Instructions    albuterol (2 5 mg/3 mL) 0 083 % nebulizer solution may use am surg prn    Ascorbic Acid (vitamin C) 1000 MG tablet ld 12/19    Calcium Carbonate-Vit D-Min (CALCIUM 600+D3 PLUS MINERALS PO) ld 12/19    cyanocobalamin (VITAMIN B-12) 1000 MCG tablet ld 12/19    ergocalciferol (VITAMIN D2) 50,000 units ld 12/19    famotidine (PEPCID) 40 MG tablet hs med    iron dextran complex in sodium chloride 0 9 % 250 mL IVPB weekly    levonorgestrel (MIRENA) 20 MCG/24HR IUD continuous    omeprazole (PriLOSEC) 40 MG capsule will take dos    vitamin k 100 MCG tablet ld 12/19    VITAMINS A D C PO ld 12/19    zinc gluconate 50 mg tablet ld 12/19     INSTR  ON hospital   LOC  ,BRING PHOTO ID/MED LIST/INS  INFO , SHOWER REV , NO ASA/NSAIDS/VIT 1 WEEK PREOP  Pre Procedure Consult Calls    1  Are you currently experiencing symptoms of fever >100 4, cough, or shortness of breath or sore throat? ______YES    __x___NO   If yes, then please call your PCP immediately for further direction  If you are completing this form on site, please find the safest and most direct route to the nearest exit and avoid close contact with others until you can get further advice from your PCP  2  Have you recently traveled to any foreign country or area within the [place] that has reported cases of COVID-19? ______YES      ____x_NO   IF YES, LIST LOCATION(S): __________________________   3  Have you recently been in contact with someone who is a suspected or confirmed case of COVID-19?   ______ YES   ____x__ No   INSTRUCTED ON NEW COVID VISITOR POLICY- ONLY 1 VISITOR, ALL MUST WEAR MASKS, PT VERBALIZES UNDERSTANDING

## 2021-12-27 ENCOUNTER — APPOINTMENT (OUTPATIENT)
Dept: RADIOLOGY | Facility: HOSPITAL | Age: 46
End: 2021-12-27
Payer: COMMERCIAL

## 2021-12-27 ENCOUNTER — HOSPITAL ENCOUNTER (OUTPATIENT)
Facility: HOSPITAL | Age: 46
Setting detail: OUTPATIENT SURGERY
Discharge: HOME | End: 2021-12-27
Attending: ORTHOPAEDIC SURGERY | Admitting: ORTHOPAEDIC SURGERY
Payer: COMMERCIAL

## 2021-12-27 ENCOUNTER — ANESTHESIA (OUTPATIENT)
Dept: PERIOP | Facility: HOSPITAL | Age: 46
End: 2021-12-27
Payer: COMMERCIAL

## 2021-12-27 VITALS
DIASTOLIC BLOOD PRESSURE: 71 MMHG | HEIGHT: 69 IN | HEART RATE: 66 BPM | TEMPERATURE: 97.8 F | SYSTOLIC BLOOD PRESSURE: 110 MMHG | OXYGEN SATURATION: 100 % | RESPIRATION RATE: 15 BRPM | BODY MASS INDEX: 27.43 KG/M2 | WEIGHT: 185.19 LBS

## 2021-12-27 DIAGNOSIS — S92.255S CLOSED NONDISPLACED FRACTURE OF NAVICULAR BONE OF LEFT FOOT, SEQUELA: Primary | ICD-10-CM

## 2021-12-27 LAB
EXT PREGNANCY TEST URINE: NEGATIVE
EXT. CONTROL: NORMAL

## 2021-12-27 PROCEDURE — C1713 ANCHOR/SCREW BN/BN,TIS/BN: HCPCS | Performed by: ORTHOPAEDIC SURGERY

## 2021-12-27 PROCEDURE — 27698 REPAIR OF ANKLE LIGAMENT: CPT | Performed by: ORTHOPAEDIC SURGERY

## 2021-12-27 PROCEDURE — C9290 INJ, BUPIVACAINE LIPOSOME: HCPCS | Performed by: STUDENT IN AN ORGANIZED HEALTH CARE EDUCATION/TRAINING PROGRAM

## 2021-12-27 PROCEDURE — NC001 PR NO CHARGE: Performed by: ORTHOPAEDIC SURGERY

## 2021-12-27 PROCEDURE — 28238 REVISION OF FOOT TENDON: CPT | Performed by: ORTHOPAEDIC SURGERY

## 2021-12-27 PROCEDURE — 73630 X-RAY EXAM OF FOOT: CPT

## 2021-12-27 PROCEDURE — 81025 URINE PREGNANCY TEST: CPT | Performed by: ORTHOPAEDIC SURGERY

## 2021-12-27 DEVICE — SONICANCHOR KIT
Type: IMPLANTABLE DEVICE | Status: FUNCTIONAL
Brand: SONICANCHOR

## 2021-12-27 DEVICE — SONICANCHOR KIT
Type: IMPLANTABLE DEVICE | Site: ANKLE | Status: FUNCTIONAL
Brand: SONICANCHOR

## 2021-12-27 RX ORDER — GLYCOPYRROLATE 0.2 MG/ML
INJECTION INTRAMUSCULAR; INTRAVENOUS AS NEEDED
Status: DISCONTINUED | OUTPATIENT
Start: 2021-12-27 | End: 2021-12-27

## 2021-12-27 RX ORDER — PROPOFOL 10 MG/ML
INJECTION, EMULSION INTRAVENOUS CONTINUOUS PRN
Status: DISCONTINUED | OUTPATIENT
Start: 2021-12-27 | End: 2021-12-27

## 2021-12-27 RX ORDER — MIDAZOLAM HYDROCHLORIDE 2 MG/2ML
INJECTION, SOLUTION INTRAMUSCULAR; INTRAVENOUS AS NEEDED
Status: DISCONTINUED | OUTPATIENT
Start: 2021-12-27 | End: 2021-12-27

## 2021-12-27 RX ORDER — METOCLOPRAMIDE HYDROCHLORIDE 5 MG/ML
INJECTION INTRAMUSCULAR; INTRAVENOUS AS NEEDED
Status: DISCONTINUED | OUTPATIENT
Start: 2021-12-27 | End: 2021-12-27

## 2021-12-27 RX ORDER — SODIUM CHLORIDE, SODIUM LACTATE, POTASSIUM CHLORIDE, CALCIUM CHLORIDE 600; 310; 30; 20 MG/100ML; MG/100ML; MG/100ML; MG/100ML
INJECTION, SOLUTION INTRAVENOUS CONTINUOUS PRN
Status: DISCONTINUED | OUTPATIENT
Start: 2021-12-27 | End: 2021-12-27

## 2021-12-27 RX ORDER — ASPIRIN 325 MG
325 TABLET, DELAYED RELEASE (ENTERIC COATED) ORAL 2 TIMES DAILY
Qty: 84 TABLET | Refills: 0 | Status: SHIPPED | OUTPATIENT
Start: 2021-12-27 | End: 2022-05-16 | Stop reason: ALTCHOICE

## 2021-12-27 RX ORDER — SCOLOPAMINE TRANSDERMAL SYSTEM 1 MG/1
1 PATCH, EXTENDED RELEASE TRANSDERMAL
Status: DISCONTINUED | OUTPATIENT
Start: 2021-12-27 | End: 2022-01-05 | Stop reason: HOSPADM

## 2021-12-27 RX ORDER — CHLORHEXIDINE GLUCONATE 4 G/100ML
SOLUTION TOPICAL DAILY PRN
Status: DISCONTINUED | OUTPATIENT
Start: 2021-12-27 | End: 2022-01-05 | Stop reason: HOSPADM

## 2021-12-27 RX ORDER — LIDOCAINE HYDROCHLORIDE 20 MG/ML
INJECTION, SOLUTION EPIDURAL; INFILTRATION; INTRACAUDAL; PERINEURAL AS NEEDED
Status: DISCONTINUED | OUTPATIENT
Start: 2021-12-27 | End: 2021-12-27

## 2021-12-27 RX ORDER — OXYCODONE HYDROCHLORIDE 5 MG/1
5 TABLET ORAL EVERY 4 HOURS PRN
Qty: 30 TABLET | Refills: 0 | Status: SHIPPED | OUTPATIENT
Start: 2021-12-27 | End: 2022-01-01

## 2021-12-27 RX ORDER — PROMETHAZINE HYDROCHLORIDE 25 MG/ML
25 INJECTION, SOLUTION INTRAMUSCULAR; INTRAVENOUS ONCE AS NEEDED
Status: DISCONTINUED | OUTPATIENT
Start: 2021-12-27 | End: 2021-12-27 | Stop reason: HOSPADM

## 2021-12-27 RX ORDER — FENTANYL CITRATE/PF 50 MCG/ML
25 SYRINGE (ML) INJECTION
Status: DISCONTINUED | OUTPATIENT
Start: 2021-12-27 | End: 2021-12-27 | Stop reason: HOSPADM

## 2021-12-27 RX ORDER — CEFAZOLIN SODIUM 1 G/50ML
1000 SOLUTION INTRAVENOUS ONCE
Status: COMPLETED | OUTPATIENT
Start: 2021-12-27 | End: 2021-12-27

## 2021-12-27 RX ORDER — VANCOMYCIN HYDROCHLORIDE 1 G/20ML
INJECTION, POWDER, LYOPHILIZED, FOR SOLUTION INTRAVENOUS AS NEEDED
Status: DISCONTINUED | OUTPATIENT
Start: 2021-12-27 | End: 2021-12-27 | Stop reason: HOSPADM

## 2021-12-27 RX ORDER — KETAMINE HCL IN NACL, ISO-OSM 100MG/10ML
SYRINGE (ML) INJECTION AS NEEDED
Status: DISCONTINUED | OUTPATIENT
Start: 2021-12-27 | End: 2021-12-27

## 2021-12-27 RX ORDER — FENTANYL CITRATE 50 UG/ML
INJECTION, SOLUTION INTRAMUSCULAR; INTRAVENOUS AS NEEDED
Status: DISCONTINUED | OUTPATIENT
Start: 2021-12-27 | End: 2021-12-27

## 2021-12-27 RX ORDER — DIPHENHYDRAMINE HYDROCHLORIDE 50 MG/ML
INJECTION INTRAMUSCULAR; INTRAVENOUS AS NEEDED
Status: DISCONTINUED | OUTPATIENT
Start: 2021-12-27 | End: 2021-12-27

## 2021-12-27 RX ORDER — LIDOCAINE HYDROCHLORIDE 10 MG/ML
INJECTION, SOLUTION EPIDURAL; INFILTRATION; INTRACAUDAL; PERINEURAL AS NEEDED
Status: DISCONTINUED | OUTPATIENT
Start: 2021-12-27 | End: 2021-12-27

## 2021-12-27 RX ORDER — DEXAMETHASONE SODIUM PHOSPHATE 4 MG/ML
INJECTION, SOLUTION INTRA-ARTICULAR; INTRALESIONAL; INTRAMUSCULAR; INTRAVENOUS; SOFT TISSUE AS NEEDED
Status: DISCONTINUED | OUTPATIENT
Start: 2021-12-27 | End: 2021-12-27

## 2021-12-27 RX ORDER — ONDANSETRON 2 MG/ML
INJECTION INTRAMUSCULAR; INTRAVENOUS AS NEEDED
Status: DISCONTINUED | OUTPATIENT
Start: 2021-12-27 | End: 2021-12-27

## 2021-12-27 RX ORDER — PROPOFOL 10 MG/ML
INJECTION, EMULSION INTRAVENOUS AS NEEDED
Status: DISCONTINUED | OUTPATIENT
Start: 2021-12-27 | End: 2021-12-27

## 2021-12-27 RX ORDER — HYDROMORPHONE HCL IN WATER/PF 6 MG/30 ML
0.2 PATIENT CONTROLLED ANALGESIA SYRINGE INTRAVENOUS
Status: DISCONTINUED | OUTPATIENT
Start: 2021-12-27 | End: 2021-12-27

## 2021-12-27 RX ADMIN — PROPOFOL 160 MG: 10 INJECTION, EMULSION INTRAVENOUS at 13:04

## 2021-12-27 RX ADMIN — FENTANYL CITRATE 25 MCG: 50 INJECTION, SOLUTION INTRAMUSCULAR; INTRAVENOUS at 13:21

## 2021-12-27 RX ADMIN — SODIUM CHLORIDE, SODIUM LACTATE, POTASSIUM CHLORIDE, AND CALCIUM CHLORIDE: .6; .31; .03; .02 INJECTION, SOLUTION INTRAVENOUS at 12:57

## 2021-12-27 RX ADMIN — MIDAZOLAM 2 MG: 1 INJECTION INTRAMUSCULAR; INTRAVENOUS at 11:05

## 2021-12-27 RX ADMIN — BUPIVACAINE 5 ML: 13.3 INJECTION, SUSPENSION, LIPOSOMAL INFILTRATION at 11:19

## 2021-12-27 RX ADMIN — GLYCOPYRROLATE 0.1 MG: 0.2 INJECTION, SOLUTION INTRAMUSCULAR; INTRAVENOUS at 12:57

## 2021-12-27 RX ADMIN — FENTANYL CITRATE 25 MCG: 50 INJECTION, SOLUTION INTRAMUSCULAR; INTRAVENOUS at 13:57

## 2021-12-27 RX ADMIN — FENTANYL CITRATE 25 MCG: 50 INJECTION, SOLUTION INTRAMUSCULAR; INTRAVENOUS at 13:16

## 2021-12-27 RX ADMIN — ONDANSETRON 4 MG: 2 INJECTION INTRAMUSCULAR; INTRAVENOUS at 13:44

## 2021-12-27 RX ADMIN — LIDOCAINE HYDROCHLORIDE 100 MG: 20 INJECTION, SOLUTION EPIDURAL; INFILTRATION; INTRACAUDAL; PERINEURAL at 13:04

## 2021-12-27 RX ADMIN — PROPOFOL 40 MG: 10 INJECTION, EMULSION INTRAVENOUS at 13:05

## 2021-12-27 RX ADMIN — SODIUM CHLORIDE, SODIUM LACTATE, POTASSIUM CHLORIDE, AND CALCIUM CHLORIDE: .6; .31; .03; .02 INJECTION, SOLUTION INTRAVENOUS at 11:05

## 2021-12-27 RX ADMIN — DIPHENHYDRAMINE HYDROCHLORIDE 25 MG: 50 INJECTION, SOLUTION INTRAMUSCULAR; INTRAVENOUS at 13:20

## 2021-12-27 RX ADMIN — FENTANYL CITRATE 50 MCG: 50 INJECTION, SOLUTION INTRAMUSCULAR; INTRAVENOUS at 13:10

## 2021-12-27 RX ADMIN — PROPOFOL 125 MCG/KG/MIN: 10 INJECTION, EMULSION INTRAVENOUS at 13:07

## 2021-12-27 RX ADMIN — DEXAMETHASONE SODIUM PHOSPHATE 8 MG: 4 INJECTION, SOLUTION INTRAMUSCULAR; INTRAVENOUS at 13:09

## 2021-12-27 RX ADMIN — CEFAZOLIN SODIUM 1000 MG: 1 SOLUTION INTRAVENOUS at 13:05

## 2021-12-27 RX ADMIN — BUPIVACAINE 15 ML: 13.3 INJECTION, SUSPENSION, LIPOSOMAL INFILTRATION at 11:11

## 2021-12-27 RX ADMIN — SCOPALAMINE 1 PATCH: 1 PATCH, EXTENDED RELEASE TRANSDERMAL at 12:56

## 2021-12-27 RX ADMIN — LIDOCAINE HYDROCHLORIDE 5 MG: 10 INJECTION, SOLUTION EPIDURAL; INFILTRATION; INTRACAUDAL; PERINEURAL at 11:15

## 2021-12-27 RX ADMIN — FENTANYL CITRATE 100 MCG: 50 INJECTION, SOLUTION INTRAMUSCULAR; INTRAVENOUS at 11:05

## 2021-12-27 RX ADMIN — FENTANYL CITRATE 50 MCG: 50 INJECTION, SOLUTION INTRAMUSCULAR; INTRAVENOUS at 13:06

## 2021-12-27 RX ADMIN — LIDOCAINE HYDROCHLORIDE 5 MG: 10 INJECTION, SOLUTION EPIDURAL; INFILTRATION; INTRACAUDAL; PERINEURAL at 11:08

## 2021-12-27 RX ADMIN — FENTANYL CITRATE 25 MCG: 50 INJECTION, SOLUTION INTRAMUSCULAR; INTRAVENOUS at 13:30

## 2021-12-27 RX ADMIN — METOCLOPRAMIDE 10 MG: 5 INJECTION, SOLUTION INTRAMUSCULAR; INTRAVENOUS at 13:42

## 2021-12-27 RX ADMIN — FENTANYL CITRATE 25 MCG: 50 INJECTION, SOLUTION INTRAMUSCULAR; INTRAVENOUS at 14:18

## 2021-12-27 RX ADMIN — FENTANYL CITRATE 25 MCG: 50 INJECTION, SOLUTION INTRAMUSCULAR; INTRAVENOUS at 14:41

## 2021-12-27 RX ADMIN — Medication 30 MG: at 13:26

## 2021-12-27 NOTE — H&P
YAHIR Cadena  Attending, Orthopaedic Surgery  Foot and Ankle  [facility] Orthopaedic [facility]        ORTHOPAEDIC FOOT AND ANKLE H+P     Assessment:   Painful navicular tuberosity after fracture and lateral ankle instability Left           Plan:   · The patient verbalized understanding of exam findings and treatment plan  We engaged in the shared decision-making process and treatment options were discussed at length with the patient  Surgical and conservative management discussed today along with risks and benefits  · Plan for left kidner procedure and left modified brostrom procedure  · Consent in chart  CONSENT FOR SOFT TISSUE PROCEDURES:   Patient understands that there is no guarantee that the surgery will relieve all of their pain and also understands that there may be a prolonged course of protected weight-bearing status required which will restrict them from driving and other activities as discussed at today's visit  Patient recognizes that there are risks with surgery including bleeding, numbness, nerve irritation, wound complications, infection, continued pain, anesthetic complications, death, failure of procedure and possible need for further surgery  The patient understands that there is no guarantee that this surgery will relieve all of Her pain and symptoms  Patient understands that there is no guarantee that they will return to full function after the procedure  Patient has provided informed consent for the procedure  History of Present Illness:   Chief Complaint: No chief complaint on file  Rommel Gary is a 40 y o  female who is being seen for left ankle instability and painful navicular tuberosity after navicular fracture  Patient has failed all conservative treatment measures  Pain is localized at fracture site with minimal radiating and described as sharp and severe  Patient denies numbness, tingling or radicular pain  Denies history of neuropathy    Patient does not smoke, does not have diabetes and does not take blood thinners  Patient denies family history of anesthesia complications and has not had any complications with anesthesia  Pain/symptom timing:  Worse during the day when active  Pain/symptom context:  Worse with activites and work  Pain/symptom modifying factors:  Rest makes better, activities make worse  Pain/symptom associated signs/symptoms: none    Prior treatment   · NSAIDs Yes    · Injections Yes   · Bracing/Orthotics Yes   · Physical Therapy Yes     Orthopedic Surgical History:   See below    Past Medical, Surgical and Social History:  Past Medical History:  has a past medical history of Asthma, Chronic kidney disease, Fracture, GERD (gastroesophageal reflux disease) (anxiety), PONV (postoperative nausea and vomiting), Pseudocholinesterase deficiency, and Renal disorder  Problem List: does not have any pertinent problems on file  Past Surgical History:  has a past surgical history that includes Bariatric Surgery; Cholecystectomy; Oophorectomy; and Elbow surgery  Family History: family history is not on file  Social History:  reports that she has never smoked  She has never used smokeless tobacco  She reports that she does not drink alcohol and does not use drugs  Current Medications: Scheduled Meds:  Current Facility-Administered Medications   Medication Dose Route Frequency Provider Last Rate    bupivacaine liposomal  20 mL Infiltration Once Maurice Kay MD      cefazolin  1,000 mg Intravenous Once Beny Page PA-C      chlorhexidine   Topical Daily PRN Moises Sinha PA-C       Continuous Infusions:   PRN Meds: chlorhexidine    Allergies: is allergic to morphine, adhesive [medical tape], nsaids, sulfacetamide, and lovenox [enoxaparin]       Review of Systems:  General- denies fever/chills  HEENT- denies hearing loss or sore throat  Eyes- denies eye pain or visual disturbances, denies red eyes  Respiratory- denies cough or SOB  Cardio- denies chest pain or palpitations  GI- denies abdominal pain  Endocrine- denies urinary frequency  Urinary- denies pain with urination  Musculoskeletal- Negative except noted above  Skin- denies rashes or wounds  Neurological- denies dizziness or headache  Psychiatric- denies anxiety or difficulty concentrating    Physical Exam:   There were no vitals taken for this visit  General/Constitutional: No apparent distress: well-nourished and well developed  Eyes: normal ocular motion  Cardio: RRR, Normal S1S2, No m/r/g  Lymphatic: No appreciable lymphadenopathy  Respiratory: Non-labored breathing, CTA b/l no w/c/r  Abdominal: Soft and nontender  Vascular: No edema, swelling or tenderness, except as noted in detailed exam   Integumentary: No impressive skin lesions present, except as noted in detailed exam   Neuro: No ataxia or tremors noted  Psych: Normal mood and affect, oriented to person, place and time  Appropriate affect  Musculoskeletal: Normal, except as noted in detailed exam and in HPI  Examination    Left    Gait Antalgic   Musculoskeletal Tender to palpation at medial navicular    Skin Normal       Nails Normal    Range of Motion  20 degrees dorsiflexion, 30 degrees plantarflexion  Subtalar motion: normal    Stability Unstable to anterior drawer    Muscle Strength 5/5 tibialis anterior  5/5 gastrocnemius-soleus  5/5 posterior tibialis  5/5 peroneal/eversion strength  5/5 EHL  5/5 FHL    Neurologic Normal    Sensation Intact to light touch throughout sural, saphenous, superficial peroneal, deep peroneal and medial/lateral plantar nerve distributions  [place]-Jane 5 07 filament (10g) testing deferred  Cardiovascular Brisk capillary refill < 2 seconds,intact DP and PT pulses    Special Tests None      Imaging Studies:   None New James R Lachman, MD  Foot & Ankle Surgery   Department [address]      I personally performed the service  Greggory Dutch Lachman, MD

## 2021-12-27 NOTE — OP NOTE
PERATIVE REPORT  PATIENT NAME: Shahana Bryant    :  1975  MRN: [id number]  Pt Location: [facility] OR ROOM 02    SURGERY DATE: 2021    Surgeon(s) and Role:     * José Manuel Godwin MD - Primary  Isla Leyden, MD- assisting  Tobi Gannon- mary    Preop Diagnosis:  Closed nondisplaced fracture of navicular bone of left foot, sequela [S92 255S]  Lateral ankle instability Left    Post-Op Diagnosis Codes:     * Closed nondisplaced fracture of navicular bone of left foot, sequela [S92 255S]  Lateral ankle instability left    Procedure(s) (LRB):  KIDNER PROCEDURE with Modified Brostrom (Left)    Specimen(s):  * No specimens in log *    Estimated Blood Loss:   Minimal    Drains:  * No LDAs found *    Anesthesia Type:   Choice    Operative Indications:  Closed nondisplaced fracture of navicular bone of left foot, sequela [S92 255S]  Lateral ankle instability left      Operative Findings:  Consistent with diagnosis    Complications:   None    Procedure and Technique:  1  Modified Brostrom procedure  2  Reconstruction (advancement), posterior tibial tendon with excision of accessory tarsal navicular bone,  (Kidner Type Procedure)   3  Placement into short leg nonweightbearing plaster splint  4  Fluoroscopy without benefit of radiologist    A curvilinear incision was made on the lateral ankle centered on the fibula  The peroneal tendon sheath was opened proximal and distal to the peroneal retinaculum  The peroneal tendons were found to be intact, without evidence of tears, there were some adhesions, the tendons were tenolysed  Attention was turned anteriorly through this incision  Great care was taken not to violate the saphenous vein or the branches of the superficial peroneal nerve or the peroneal retinaculum  Dissection was carried down bluntly and sharply down to the level of the lateral fibula but not deep to the periosteum or the extensor retinaculum   A small periosteal flap was created sharply with a 15-blade along the distal fibula  The peroneal tendons were identified at the posterior aspect of this periosteal flap and protected throughout the length of the case  The ankle joint was entered  A bleeding bone trough along the distal fibula was made with a rongeur  The ankle was then placed in valgus and dorsiflexion and no anterior translation  Two [place] Sonic suture anchors were then placed in sequential fashion on the anterior surface of the fibula, being careful not to violate the ankle joint  The sutures were passed in sequential fashion through the proximal edges of the ATFL and CFL  The ankle was held in slight eversion with neutral location of the talus in the mortise  These were tied down and tensioned to recreate tension of the lateral structures  The previously made periosteal flap was then sutured to the repaired lateral structures creating a pants over vest construct  There appeared to be good reconstruction of the lateral structures with adequate tension  The retinaculum and the periosteal flap were reinforced with Ethibond suture  A linear, skin-only incision, approximately 5cm in length, was made using a #15 blade in line with the posterior tibial tendon  The dissecting scissors were used to bluntly dissect down the the tendon itself cauterizing any traversing veins in the surgical field  The superficial fascia over the tendon dorsally was split using a deep #15 blade and the tendon insertion into the navicular was identified and sharply dissected off the the bone from dorsal to plantar  The navicular tuberosity  was fully exposed being careful not to compromise the plantar insertion and remaining fibers of the posterior tibial tendon  A 105 saw blade was used on the microsaggital saw to remove the navicular tuberosity and the medial edge of the navicular was resected to match the level of the medial edge of the medial cuneiform    Using the drill for the [place], two  holes were made in the remaining navicular and the sonic anchors were inserted (one double loaded, the other single loaded)  Taking care to restore the natural tension of the tendon, and not advance or tighten the tendon, we re-attached the posterior tibial tendon to the navicular using the anchors  Next we repaired the superior portion of the posterior tibial tendon to the periosteal and fascial sleeve we created during exposure using braided, non-absorbable 3-0 suture  Fluoroscopy was used to confirm satisfactory resection of the accessory navicular  The tourniquet was released and all bleeders cauterized  Satisfactory Capillary Refill remained in the toes  The wound was thoroughly irrigated and 500mg vancomycin powder was sprinkled in the wound  The incision was closed in layers using 2-0 vicryl, 4-0 vicryl and 3-0 nylon sutures  Sterile dressings were applied and a splint was placed  The patient tolerated procedure well was taken to the recovery room in stable condition          I was present for the entire procedure    Patient Disposition:  PACU       SIGNATURE: Raulito Arevalo MD  DATE: December 27, 2021  TIME: 9:21 AM

## 2021-12-27 NOTE — DISCHARGE INSTRUCTIONS
YAHIR Obrien  Attending, [address] [place] [address] Office Phone: [phone] ? Fax: [phone]  [facility] Office Phone: [phone] ? JOQ:[phone]    : Saturnino Burroughs [place], [place]     Surgery Coordinators [facility]: Boris Griffin, [address] [facility], [phone]  Surgery Coordinator Julio:  Judy Saini, [phone]                                                             Megan Solares, [phone]  www [email or web address]   PRE-OPERATIVE AND POST-OPERATIVE INSTRUCTIONS    General Information:   Typical post operative visits are at the following intervals:  2-3 weeks post surgery, 6 weeks post surgery, 3 months post surgery, 6 months post surgery, and then on a yearly basis  However, this may change based on Dr Pili Kirkpatrick recommendation   #1 post-operative rule for foot/ankle surgery:  ONCE YOU ARE OUT OF YOUR CAST AND/OR REMOVABLE BOOT, SWELLING MAY PERSIST FOR MANY MONTHS  YOU MIGHT ALSO EXPERIENCE A BLUISH DISCOLORATION OF YOUR LEG  THIS IS NORMAL AND PART OF THE USUAL POSTOPERATIVE EXPERIENCE    DO NOT WAIT UNTIL YOUR BLOCK WEARS OFF TO TAKE YOUR PAIN MEDICATION  IT TAKES A FEW DOSES OF THE PAIN MEDICATION TO REACH A THERAPEUTIC LEVEL  TAKE A TABLET PROACTIVELY BEFORE YOU HAVE ANY PAIN AND AGAIN 4 HOURS LATER SO WHEN THE BLOCK WEARS OFF, YOU ARE NOT CAUGHT OFF GUARD  SMOKING:   Smoking results in incomplete healing of fractures (broken bones) and joints that my have been fused  Smoking and nicotine also prevents the growth of bone into ankle replacements and bone healing  It also slows the healing of muscles and skin (soft tissue)  Therefore, please do not have surgery if you continue to smoke  We reserve the right to cancel your surgery if we suspect that you are smoking  DO NOT use nicorette gum or other patches    Please find an alternative method to quit smoking before your surgery and do not restart after surgery to allow for healing  THREE RULES:    1  After surgery you will most likely be given the instructions KEEP YOUR TOES ABOVE YOUR NOSE    This means that you MUST have your feet elevated higher than your heart  Keeping your toes above your nose helps to heal the muscles and skin (soft tissues) by reducing swelling in your leg  This position also helps to prevent infection, and is very important in avoiding deep venous thrombosis (blood clots)  2  In order to keep the blood circulating in your legs and in order to avoid deep vein   thrombosis (blood clots), we ask patients to GET UP ONCE AN HOUR during the day  This means you should at least cross the room and come back  It does not mean you have to be up for long periods of time  In most cases we will not have people immediately put any weight on their operated part  This is important to prevent loosening of metal or other devices holding the bones together  It also prevents irritation of the soft tissues which can lead to prolonged healing  When we say get up once an hour, please walk, hop or move with an assisted device  This is important! 3  Do not do any excessive walking during the first few days after surgery  Recovering from surgery is a full-time task for the patient  Postoperative care is important to avoid irritating the skin incision, which can lead to infection  Please do not plan activities or go out of town for several weeks after surgery  AFTER YOUR SURGERY:   Bleeding through the bandage almost always occurs  Do not let this alarm you  Simply add more gauze or a towel, call us, and come in for a dressing change  If you think it is excessive, contact us immediately or go to the local emergency room   Do not get the bandage wet  Showering is possible with plastic protectors  Be very careful, as the bathroom can be wet and slippery    If you do get your dressing wet, it should be changed immediately  Please contact us   ONCE YOUR ARE OUT OF YOUR CAST AND/OR REMOVABLE BOOT, SWELLING MAY PERSIST FOR MANY MONTHS  THERE WILL ALSO BE A BLUISH DISCOLORATION OF YOUR LEG FOR MONTHS  THIS IS NORMAL AND PART OF THE USUAL POSTOPERATIVE EXPERIENCE  WEARING COMPRESSION HOSE (ELASTIC STOCKINGS) CAN HELP AVOID SOME OF THIS SWELLING   Ice the area 20 minutes every hour once the nerve block wears off  If you are in a cast or a splint, you may need to leave the ice on longer than 20 minutes in order to feel any benefits  DRESSING:   The purpose of the surgical dressing is to keep your wound and the surgical site protected from the environment  Most dressings contain splints, which help to hold your foot and ankle in a corrected position, and also allow the surgical site to heal properly  If you have a drain in place, this will need to be removed in 1 day after surgery  The time for the drain to be pulled will be written on your discharge instruction sheet  CAST  INSTRUCTIONS:  You may or may not get a cast following surgery  If you do, pay close attention to the following:     After application of a splint or cast, it is very important to elevate your leg for 24 to 72 hours  The injured area should be elevated well above the heart  Remember Toes above your Nose  Rest and elevation greatly reduce pain and speed the healing process by minimizing early swelling      CALL YOUR DOCTORS OFFICE OR VISIT LOCATION EMERGENCY ROOM IF YOU HAVE ANY OF THE FOLLOWING:     Significant increased pain, which may be caused by swelling (Strict elevation will alleviate this)   Numbness and tingling in your hand or foot, which may be caused by too much pressure on the nerves (There is always some numbness after surgery due to nerve blocks)   Burning and stinging, which may be caused by too much pressure on the skin   Excessive swelling below the cast, which may mean the cast is slowing your blood circulation   Loss of active movement of toes, which request an urgent evaluation   Loss of capillary refill  Pinch the tip of toes and jonathan the skin  Release pressure and if the skin does not return pink then call the office immediately  DO NOT GET YOUR CAST WET  Bacteria thrive in moist dark areas  We do not want this  If your cast becomes wet, return to the office and we will apply another one  PAIN AFTER SURGERY:  Narcotic pain medication can and will depress your respiratory system if taken in excess  The goal of pain management with narcotics is to be comfortable not pain free  If you take enough narcotics to be pain free then you run the risk of stopping breathing  If this happens, call 911 immediately!  Pain in the heel is often  caused by pressure from the weight of your foot on the bed  Make sure your heel is suspended off the bed by keeping a pillow underneath your calf not your knee  Medications: You will be given narcotic pain medication  Do NOT drive while taking narcotic medications  Medications such as Darvocet, Percocet, Vicoden or Tylenol #3, also contain acetaminophen (Tylenol)  Do not take acetaminophen or Tylenol from home when taking theses medications  When you fill your prescription, you may ask the pharmacist if your pain medication has acetaminophen/Tylenol in it  It is okay to take Tylenol with Oxycontin/Oxycodone  Unless you are allergic to aspirin or currently taking a blood thinner, Dr Jessica Padilla patients are requested to take one 325 mg aspirin every 12 hours until you are back to walking normally after surgery (This can be up to 6 weeks)  Ecotrin (Enteric-coated aspirin) is more sensitive to the stomach and we recommend purchasing this instead of regular aspirin to minimize the risk of stomach irritation  Narcotic medications commonly cause nausea  Taking them with food will decrease this side effect   If you are having extreme nausea, please contact us for an alternative medication or for something that can be taken with this medication to decrease the nausea  Also, narcotic medications frequently cause constipation  An increase of fiber, fruits and vegetables in your diet may alleviate this problem, or if necessary, you may use an over-the-counter medication such as senekot, colace, or Fibercon for constipation problems  You should resume all medications you were taking prior to the surgery unless otherwise specified  If you had fracture surgery, bony surgery like an osteotomy or fusion, or a surgery that requires bone healing, you are advised to take Vitamin D and Calcium to improve healing potential   Vitamin D3 4000 units/day and Calcium 1200mg/day  These are over the counter medications so please pick them up at the pharmacy when you are picking up your prescriptions  Activity:   Because of your recent foot surgery, your activity level will decrease  You will need to elevate your foot ABOVE the level of your heart for a minimum of four days  The length of time necessary for the swelling to go down, and for your wounds to heal properly depends greatly on your efforts here  Elevation is extremely important to avoid compromising the blood supply to your foot  Remember when your foot is down it will swell, which will increase pain and slow healing  Wiggle your toes frequently if possible  If you go home with a regional block, (a type of anesthesia) the foot and leg will be numb  Think of ways to get into your house and around the house until the block wears off  Keep in mind that it may be a legal issue if you drive while in a cast or splint, especially when the splint is on the right foot  You may call the Department of Motor Vehicles to schedule a road test if you have adaptive equipment applied to your car     The amount of weight you are allowed to bear on your foot will be written on your discharge sheet filled out at the time of surgery  The following is an explanation of the possibilities:     Non-weight bearing: You are to put NO weight whatsoever on your foot  When using crutches or a walker, your foot should not touch the ground, except when you are standing  Then, it may rest on the ground  If you are to be non-weight bearing, and you are not compliant, you could compromise the surgery  Some of our patients have been requesting prescriptions for a roll-a-bout knee scooter  [employer] and other insurances have been denying these claims, and you may either have to rent one or pay out of pocket to purchase one

## 2022-01-18 ENCOUNTER — OFFICE VISIT (OUTPATIENT)
Dept: OBGYN CLINIC | Facility: CLINIC | Age: 47
End: 2022-01-18
Payer: COMMERCIAL

## 2022-01-18 VITALS — HEIGHT: 69 IN | WEIGHT: 185 LBS | BODY MASS INDEX: 27.4 KG/M2

## 2022-01-18 DIAGNOSIS — M25.372 ANKLE INSTABILITY, LEFT: ICD-10-CM

## 2022-01-18 DIAGNOSIS — S92.255S CLOSED NONDISPLACED FRACTURE OF NAVICULAR BONE OF LEFT FOOT, SEQUELA: Primary | ICD-10-CM

## 2022-01-18 PROCEDURE — 29405 APPL SHORT LEG CAST: CPT | Performed by: ORTHOPAEDIC SURGERY

## 2022-01-18 PROCEDURE — 99024 POSTOP FOLLOW-UP VISIT: CPT | Performed by: ORTHOPAEDIC SURGERY

## 2022-01-18 NOTE — PROGRESS NOTES
YAHIR Hairston  Attending, Orthopaedic Surgery  Foot and Ankle  [facility]      ORTHOPAEDIC FOOT AND ANKLE POST-OP VISIT     Procedure:     Left Kidner procedure and modified Brostrum       Date of surgery:   12/27/21      PLAN  1  Weightbearing Status- NWB operative extremity  2  DVT prophylaxis- ASA 325mg BID  3  Continue to elevate 23hrs/day getting up 1x per hour to prevent a blood clot  4  Pain control- OTC pain medication  5  RTC in 3 weeks  6  Xrays needed next visit - yes Weightbearing Foot- internal oblique view  History of Present Illness:   Chief Complaint:   Chief Complaint   Patient presents with   Chito Poster is a 40 y o  female who is being seen for post-operative visit for the above procedure  Pain is well controlled and the patient has successfully transitioned to OTC pain medicines  she is taking ASA 325mg BID for DVT prophylaxis  Patient has been NWB in a Splint  Review of Systems:  General- denies fever/chills  Respiratory- denies cough or SOB  Cardio- denies chest pain or palpitations  GI- denies abdominal pain  Musculoskeletal- Negative except noted above  Skin- denies rashes or wounds    Physical Exam:   Ht 5' 9" (1 753 m)   Wt 83 9 kg (185 lb)   BMI 27 32 kg/m²   General/Constitutional: No apparent distress: well-nourished and well developed  Eyes: normal ocular motion  Lymphatic: No appreciable lymphadenopathy  Respiratory: Non-labored breathing  Vascular: No edema, swelling or tenderness, except as noted in detailed exam   Integumentary: No impressive skin lesions present, except as noted in detailed exam   Neuro: No ataxia or tremors noted  Psych: Normal mood and affect, oriented to person, place and time  Appropriate affect  Musculoskeletal: Normal, except as noted in detailed exam and in HPI      Examination    left        Incision Clean, dry, intact  Sutures Removed this visit    Ecchymosis none    Swelling Mild Sensation Intact to light touch throughout sural, saphenous, superficial peroneal, deep peroneal and medial/lateral plantar nerve distributions  [place]-Jane 5 07 filament (10g) testing deferred  Cardiovascular Brisk capillary refill < 2 seconds,intact DP and PT pulses    Special Tests None        Cast application    Date/Time: 1/18/2022 10:14 AM  Performed by: Nicole Tee MD  Authorized by: Nicole Tee MD   Universal Protocol:  Timeout called at: 1/18/2022 10:14 AM   Patient identity confirmed: verbally with patient      Pre-procedure details:     Sensation:  Normal  Procedure details:     Laterality:  Left    Location:  Ankle    Ankle:  L ankleCast type:  Short leg      Supplies:  Cotton padding and fiberglass        Amanda Chroman Lachman, MD  Foot & Ankle Surgery   Department of [address] [place] [address] personally performed the service  Amanda Chroman Lachman, MD

## 2022-01-18 NOTE — PATIENT INSTRUCTIONS
Touch-down WB    Continue aspirin/lovenox to prevent blood clots  Purchase a compression stocking (Knee high, 20-30mm Hg) to be worn at all times while awake for your next visit when the cast comes off  Recommend taking the following supplements: Vitamin D3 4000 units per day and Calcium 1200 mg per day  This will help with bone healing  Care of Casts and Splints    Casts and splints support and protect injured bones and soft tissue  When you break a bone, your doctor will put the pieces back together in the right position  Casts and splints hold the bones in place while they heal  They also reduce pain, swelling, and muscle spasm  In some cases, splints and casts are applied following surgery  Splints or "half-casts" provide less support than casts  However, splints can be adjusted to accommodate swelling from injuries easier than enclosed casts  Your doctor will decide which type of support is best for you  Types of Splints and Casts  Casts are custom-made  They must fit the shape of your injured limb correctly to provide the best support  Casts can be made of plaster or fiberglass -- a plastic that can be shaped  Splints or half-casts can also be custom-made, especially if an exact fit is necessary  Other times, a ready-made splint will be used  These off-the-shelf splints are made in a variety of shapes and sizes, and are much easier and faster to use  They have Velcro straps which make the splints easy to put on, take off, and adjust     Materials  Fiberglass or plaster materials form the hard supportive layer in splints and casts  Fiberglass is lighter in weight and stronger than plaster  In addition, x-rays can "see through" fiberglass better than through plaster  This is important because your doctor will probably schedule additional x-rays after your splint or cast has been applied  X-rays can show whether the bones are healing well or have moved out of place    Plaster is less expensive than fiberglass and shapes better than fiberglass for some uses  Application  Both fiberglass and plaster splints and casts use padding, usually cotton, as a protective layer next to the skin  Both materials come in strips or rolls which are dipped in water and applied over the padding covering the injured area  The splint or cast must fit the shape of the injured arm or leg correctly to provide the best possible support  Generally, the splint or cast also covers the joint above and below the broken bone  In many cases, a splint is applied to a fresh injury first  As swelling subsides, a full cast may replace the splint  Sometimes, it may be necessary to replace a cast as swelling goes down and the cast gets "too big " As a fracture heals, the cast may be replaced by a splint to make it easier to perform physical therapy exercises  Apply ice to the splint or cast and elevate your leg to reduce swelling  Warning Signs  Swelling can create a lot of pressure under your cast  This can lead to problems  If you experience any of the following symptoms, contact your doctor's office immediately for advice   Increased pain and the feeling that the splint or cast is too tight  This may be caused by swelling   Numbness and tingling in your hand or foot  This may be caused by too much pressure on the nerves   Burning and stinging  This may be caused by too much pressure on the skin   Excessive swelling below the cast  This may mean the cast is slowing your blood circulation   Loss of active movement of toes or fingers  This requires an urgent evaluation by your doctor  Getting Used to a Splint or Cast  Swelling due to your injury may cause pressure in your splint or cast for the first 48 to 72 hours  This may cause your injured arm or leg to feel snug or tight in the splint or cast  If you have a splint, your doctor will show you how to adjust it to accommodate the swelling    It is very important to keep the swelling down  This will lessen pain and help your injury heal  To help reduce swelling:   Elevate  It is very important to elevate your injured arm or leg for the first 24 to 72 hours  Prop your injured arm or leg up above your heart by putting it on pillows or some other support  You will have to recline if the splint or cast is on your leg  Elevation allows clear fluid and blood to drain "downhill" to your heart   Exercise  Move your uninjured, but swollen fingers or toes gently and often  Moving them often will prevent stiffness   Ice  Apply ice to the splint or cast  Place the ice in a dry plastic bag or ice pack and loosely wrap it around the splint or cast at the level of the injury  Ice that is packed in a rigid container and touches the cast at only one point will not be effective  Taking Care of Your Splint or Cast  Your doctor will explain any restrictions on using your injured arm or leg while it is healing  You must follow your doctor's instructions carefully to make sure your bone heals properly  The following information provides general guidelines only, and is not a substitute for your doctor's advice  After you have adjusted to your splint or cast for a few days, it is important to keep it in good condition  This will help your recovery   Keep your splint or cast dry  Moisture weakens plaster and damp padding next to the skin can cause irritation  Use two layers of plastic or purchase waterproof shields to keep your splint or cast dry while you shower or bathe  Even if the cast is covered, do not submerge it or hold it under running water  A small pinhole in the cast cover can cause the injury to get soaked   Walking casts  Do not walk on a "walking cast" until it is completely dry and hard  It takes about one hour for fiberglass, and two to three days for plaster to become hard enough to walk on   Avoid dirt   Keep dirt, sand, and powder away from the inside of your splint or cast     Padding  Do not pull out the padding from your splint or cast     Itching  Do not stick objects such as coat hangers inside the splint or cast to scratch itching skin  Do not apply powders or deodorants to itching skin  If itching persists, contact your doctor   Trimming  Do not break off rough edges of the cast or trim the cast before asking your doctor   Skin  Inspect the skin around the cast  If your skin becomes red or raw around the cast, contact your doctor   Inspect the cast regularly  If it becomes cracked or develops soft spots, contact your doctor's office  Use common sense  You have a serious injury and you must protect your cast from damage so it can protect your injury while it heals  After the initial swelling has subsided, proper splint or cast support will usually allow you to continue your daily activities with a minimum of inconvenience  Cast Removal  Never remove the cast yourself  You may cut your skin or prevent proper healing of your injury  Your doctor will use a cast saw to remove your cast  The saw vibrates, but does not rotate  If the blade of the saw touches the padding inside the hard shell of the cast, the padding will vibrate with the blade and will protect your skin  Cast saws make noise and may feel "hot" from friction, but will not harm you -- "their bark is worse than their bite "  If you do feel pain while the cast is being removed, let your doctor or an assistant know and they will be able to make adjustments  The saw vibrates but does not rotate  Cast saws make noise but will not harm you

## 2022-02-11 ENCOUNTER — OFFICE VISIT (OUTPATIENT)
Dept: OBGYN CLINIC | Facility: CLINIC | Age: 47
End: 2022-02-11

## 2022-02-11 ENCOUNTER — APPOINTMENT (OUTPATIENT)
Dept: RADIOLOGY | Facility: CLINIC | Age: 47
End: 2022-02-11
Payer: COMMERCIAL

## 2022-02-11 VITALS — WEIGHT: 185 LBS | HEIGHT: 69 IN | BODY MASS INDEX: 27.4 KG/M2

## 2022-02-11 DIAGNOSIS — S92.255S CLOSED NONDISPLACED FRACTURE OF NAVICULAR BONE OF LEFT FOOT, SEQUELA: ICD-10-CM

## 2022-02-11 DIAGNOSIS — S92.255S CLOSED NONDISPLACED FRACTURE OF NAVICULAR BONE OF LEFT FOOT, SEQUELA: Primary | ICD-10-CM

## 2022-02-11 PROCEDURE — 99024 POSTOP FOLLOW-UP VISIT: CPT | Performed by: ORTHOPAEDIC SURGERY

## 2022-02-11 PROCEDURE — 73630 X-RAY EXAM OF FOOT: CPT

## 2022-02-11 NOTE — PROGRESS NOTES
YAHIR Quintero  Attending, Orthopaedic Surgery  Foot and Ankle  [facility]      ORTHOPAEDIC FOOT AND ANKLE POST-OP VISIT     Procedure:     Left Kidner procedure and modified Brostrum       Date of surgery:   12/27/21      PLAN  1  Weightbearing Status- PWB operative extremity  2  DVT prophylaxis- may discontinue  3  Compression stocking, begin PT, ice, elevation  4  Pain control- OTC pain medication  5  RTC in 6 weeks  6  Xrays needed next visit - yes Weightbearing foot-internal oblique view    History of Present Illness:   Chief Complaint: left foot post op     Lida Workman is a 40 y o  female who is being seen for post-operative visit for the above procedure  Pain is well controlled and the patient has successfully transitioned to OTC pain medicines  she is taking ASA 325mg BID for DVT prophylaxis  Patient has been NWB in a short leg cast       Review of Systems:  General- denies fever/chills  Respiratory- denies cough or SOB  Cardio- denies chest pain or palpitations  GI- denies abdominal pain  Musculoskeletal- Negative except noted above  Skin- denies rashes or wounds    Physical Exam:   There were no vitals taken for this visit  General/Constitutional: No apparent distress: well-nourished and well developed  Eyes: normal ocular motion  Lymphatic: No appreciable lymphadenopathy  Respiratory: Non-labored breathing  Vascular: No edema, swelling or tenderness, except as noted in detailed exam   Integumentary: No impressive skin lesions present, except as noted in detailed exam   Neuro: No ataxia or tremors noted  Psych: Normal mood and affect, oriented to person, place and time  Appropriate affect  Musculoskeletal: Normal, except as noted in detailed exam and in HPI  Examination    left        Incision Clean, dry, intact  Sutures Previously removed      Ecchymosis none    Swelling Mild    Sensation Intact to light touch throughout sural, saphenous, superficial peroneal, deep peroneal and medial/lateral plantar nerve distributions  [place] 5 07 filament (10g) testing deferred  Cardiovascular Brisk capillary refill < 2 seconds,intact DP and PT pulses    Special Tests None      Imaging Studies:   3 views of the left foot were taken, reviewed and interpreted independently that demonstrate stable and intact hardware  Reviewed by me personally  Robin Quaker Lachman, MD  Foot & Ankle Surgery   Department of [address] [place] [address]      I personally performed the service  Robin Quaker Lachman, MD    Scribe Attestation    I,:   am acting as a scribe while in the presence of the attending physician :       I,:   personally performed the services described in this documentation    as scribed in my presence :

## 2022-02-11 NOTE — PATIENT INSTRUCTIONS
2 days of partial weight bearing 50%  Then, 2 days of partial weight bearing 75%  Then, 2 days of partial weight bearing 90%  Then full weight bearing in the boot and wean your crutches/rolling walker  Then 3 weeks of weightbearing as tolerated in the CAM boot  You may begin weaning your boot and transitioning to a sneaker (3/10)  It is important to do this gradually to avoid aggravating the healing process  1  3/10, you may come out of the boot into a sneaker for 2 hours  2  3/11, you may come out of the boot into a sneaker for 4 hours,  3  The next day, you may come out of the boot into a sneaker for 6 hours  4  Continue this (adding 2 hours per day) as you tolerate  For example, if you do 6 hours out of the boot into a sneaker and your foot swells more than usual at night and it is difficult to control the discomfort, do not advance to 8 hours the next day, stay at 6 hours until you are able to tolerate it  Elevation, Ice and tylenol and staying off of it at night will be important to aide in this transition out of the boot  Swelling and soreness are normal as you begin to do more with the injured leg  May DC aspirin/lovenox, no longer needed  May shower, do not soak in a tub/pool/ocean/etc for another 1 weeks  Begin PT  Scar massage- pea sized amount of lotion, massage into scar for 5 minutes each day  Compression stocking (Knee high, 20-30mm Hg) to be worn at all times while awake

## 2022-02-17 ENCOUNTER — EVALUATION (OUTPATIENT)
Dept: PHYSICAL THERAPY | Facility: CLINIC | Age: 47
End: 2022-02-17
Payer: COMMERCIAL

## 2022-02-17 DIAGNOSIS — S92.255S CLOSED NONDISPLACED FRACTURE OF NAVICULAR BONE OF LEFT FOOT, SEQUELA: ICD-10-CM

## 2022-02-17 DIAGNOSIS — S92.252K CLOSED DISPLACED FRACTURE OF NAVICULAR BONE OF LEFT FOOT WITH NONUNION, SUBSEQUENT ENCOUNTER: Primary | ICD-10-CM

## 2022-02-17 PROCEDURE — 97162 PT EVAL MOD COMPLEX 30 MIN: CPT | Performed by: PHYSICAL THERAPIST

## 2022-02-17 NOTE — PROGRESS NOTES
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   Impairments: abnormal coordination, abnormal muscle firing, abnormal or restricted ROM, abnormal movement, activity intolerance, difficulty understanding, lacks appropriate home exercise program, pain with function, scapular dyskinesis, weight-bearing intolerance and poor posture     Symptom irritability: moderateUnderstanding of Dx/Px/POC: good   Prognosis: good    Goals  Short Term Goals (4 weeks)  1 ) Establish independence with HEP  2 ) Decrease subjective pain levels from NPRS at least to 2-5/10 at rest and with activity  3 ) Improve L ankle ROM at least 5-10 degrees into all planes to allow for improved ease of movement with less guarding    Long Term Goals (8 weeks)  1 ) Improve L ankle ROM to WNL in all planes to restore normal movement with ADLs and function  2 ) Improve L ankle strength to 5/5 in all planes in order to return to pain-free ADLs and function  3 ) Improve FOTO score at least to 75 points showing improved self reported disability   4 ) Reduce L ankle effusion at least  5 to 1 cm     Plan  Plan details: Initiate POC for L ankle ROM, proprioception and progressive WBing; monitor sxs and progress as able  Patient would benefit from: skilled physical therapy and PT eval  Planned modality interventions: cryotherapy, biofeedback, electrical stimulation/Russian stimulation and TENS  Planned therapy interventions: abdominal trunk stabilization, activity modification, ADL retraining, ADL training, balance, balance/weight bearing training, behavior modification, IADL retraining, joint mobilization, manual therapy, motor coordination training, muscle pump exercises, neuromuscular re-education, orthotic management and training, patient education, postural training, self care, sensory integrative techniques, strengthening, stretching, therapeutic activities, therapeutic exercise, therapeutic training, graded exercise, graded activity, gait training, functional ROM exercises, flexibility, coordination, body mechanics training, graded motor and home exercise program  Frequency: 2x week  Duration in visits: 16  Duration in weeks: 8  Plan of Care beginning date: 2/17/2022  Plan of Care expiration date: 4/21/2022  Treatment plan discussed with: patient        Subjective Evaluation    History of Present Illness  Date of onset: 5/1/2021  Date of surgery: 12/27/2021  Mechanism of injury: Sally Ryan is a 40 y o  female who presents with increased L navicular fracture starting ~ 9 months ago with after falling down stairs with an inversion sprain  Was able to walk initially  Notes going to [facility] and was (-) for malleolar and was then referred to Ortho and sent to [facility] PT -2 months without improvement  Tried manage conservatively for a few weeks in a boot without improvement    Then was referred for surgical consult in November and decided for 22 for surgery  Had a lateral ankle reconstruction as a result of + anterior drawer  Was sent home the same day  Post surgical splint for 3 weeks then hard cast for 4 weeks NWBing which was removed on 22  Now 50% WBing then progression to WBAT  Had F/U with MD on 22  Denies any night pain or changes in bowel or bladder function  Denies any NT signs or sxs aside from incisional pain  F/U with MD in 6 weeks  Wishes to return to PLOF pain-free walking at the gym and perform basic ADLs             Not a recurrent problem   Quality of life: good    Pain  Current pain ratin  At best pain ratin  At worst pain ratin  Location: L ankle  Quality: sharp, pulling and dull ache  Relieving factors: rest, support, heat and medications  Aggravating factors: walking, standing and sitting  Progression: worsening    Social Support  Steps to enter house: yes  2  Stairs in house: yes   15  Lives in: multiple-level home  Lives with: spouse and young children    Employment status: not working  Exercise history: Gym occasionally   Life stress: No       Diagnostic Tests  X-ray: abnormal  Treatments  Previous treatment: physical therapy  Current treatment: immobilization and physical therapy  Patient Goals  Patient goals for therapy: decreased edema, decreased pain, improved balance, increased motion, increased strength, independence with ADLs/IADLs and return to sport/leisure activities  Patient goal: Get back to regular life        Objective     Active Range of Motion   Left Ankle/Foot   Dorsiflexion (kf): 15 degrees   Plantar flexion: 50 degrees   Inversion: 17 degrees   Eversion: 8 degrees     Right Ankle/Foot   Dorsiflexion (kf): 17 degrees   Plantar flexion: 55 degrees   Inversion: 35 degrees   Eversion: 21 degrees     Strength/Myotome Testing     Left Ankle/Foot   Dorsiflexion: 4+  Plantar flexion: 3  Inversion: 4+  Eversion: 4+    Right Ankle/Foot   Dorsiflexion: 5  Plantar flexion: 5  Inversion: 5  Eversion: 5    Tests     Right Ankle/Foot   Negative for anterior drawer  Swelling   Left Ankle/Foot   Figure 8: 51 5 cm  Malleoli: 25 cm    Right Ankle/Foot   Figure 8: 49 5 cm  Malleoli: 22 cm    Functional Assessment      Squat    Pain, left valgus and trunk lean right  Single Leg Stance   Left: 0 seconds  Right: 30 seconds             Precautions: Chronic kidney disease  EPOC: 4/21/22  HEP: Access Code: [email or web address]  URL: [email or web address]  Date: 02/17/2022  Prepared by: Rosa Wheat    Exercises  · Ankle Circles on Wobble Board in Sitting - 1 x daily - 7 x weekly - 3 sets - 10 reps  · Towel Scrunches - 1 x daily - 7 x weekly - 3 sets - 10 reps  · Arch Lifting - 1 x daily - 7 x weekly - 3 sets - 10 reps  · Standing Weight Shift - 1 x daily - 7 x weekly - 3 sets - 10 reps  · Stride Stance Weight Shift - 1 x daily - 7 x weekly - 3 sets - 10 reps  · Alternating Step Forward with Support - 1 x daily - 7 x weekly - 3 sets - 10 reps  · Tandem Stance - 1 x daily - 7 x weekly - 3 sets - 10 reps  · Standing Heel Raise with Support - 1 x daily - 7 x weekly - 3 sets - 10 reps           Manuals 2/17            L ankle PROM             STM L ankle                                       Neuro Re-Ed             Tandem 3x30"            Wt shifting AP and ML 10x ea            Static stepping anterior and lateral 10x ea                                                                Ther Ex             Ankle 4 way             Isometric  INV/EV             Ankle circles 10x                                                                             Ther Activity                                       Gait Training                                       Modalities

## 2022-02-24 ENCOUNTER — OFFICE VISIT (OUTPATIENT)
Dept: PHYSICAL THERAPY | Facility: CLINIC | Age: 47
End: 2022-02-24
Payer: COMMERCIAL

## 2022-02-24 DIAGNOSIS — S92.255S CLOSED NONDISPLACED FRACTURE OF NAVICULAR BONE OF LEFT FOOT, SEQUELA: ICD-10-CM

## 2022-02-24 DIAGNOSIS — S92.252K CLOSED DISPLACED FRACTURE OF NAVICULAR BONE OF LEFT FOOT WITH NONUNION, SUBSEQUENT ENCOUNTER: Primary | ICD-10-CM

## 2022-02-24 PROCEDURE — 97112 NEUROMUSCULAR REEDUCATION: CPT | Performed by: PHYSICAL THERAPIST

## 2022-02-24 PROCEDURE — 97140 MANUAL THERAPY 1/> REGIONS: CPT | Performed by: PHYSICAL THERAPIST

## 2022-02-24 PROCEDURE — 97110 THERAPEUTIC EXERCISES: CPT | Performed by: PHYSICAL THERAPIST

## 2022-02-24 NOTE — PROGRESS NOTES
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, subsequent encounter  S92 252K    2  Closed nondisplaced fracture of navicular bone of left foot, sequela  S92 255S                   Subjective: Notes feeling better, happy with progress to date, still 2 more weeks within the boot  Objective: See treatment diary below      Assessment: Medial ankle pain with performance of HR likely from posterior tibialis weakness  Continued limited ankle INV/EV ROM with firm end feels  Most pain with any HR activation with ambulation and heel off phases  Progression with isometric PF will be beneficial        Plan: Continue per plan of care  Precautions: Chronic kidney disease  EPOC: 22  HEP: Access Code: [email or web address]  URL: [email or web address]  Date: 2022  Prepared by: oJhn Hernandez    Exercises  · Ankle Circles on Wobble Board in Sitting - 1 x daily - 7 x weekly - 3 sets - 10 reps  · Towel Scrunches - 1 x daily - 7 x weekly - 3 sets - 10 reps  · Arch Lifting - 1 x daily - 7 x weekly - 3 sets - 10 reps  · Standing Weight Shift - 1 x daily - 7 x weekly - 3 sets - 10 reps  · Stride Stance Weight Shift - 1 x daily - 7 x weekly - 3 sets - 10 reps  · Alternating Step Forward with Support - 1 x daily - 7 x weekly - 3 sets - 10 reps  · Tandem Stance - 1 x daily - 7 x weekly - 3 sets - 10 reps  · Standing Heel Raise with Support - 1 x daily - 7 x weekly - 3 sets - 10 reps           Manuals            L ankle PROM  PF           STM L ankle  PF             25'                        Neuro Re-Ed             Tandem 3x30" 3x30"           Wt shifting AP and ML 10x ea 20x ea           Static stepping anterior and lateral 10x ea 20x ea                                                               Ther Ex             Ankle 4 way  SL INV/EV 20x ea           Isometric  INV/EV             Ankle circles 10x 20x           Foam rocking AP  30x           Wall squat  2x10           STD hip 3 way  10x ea           Step lunges  20x ea                        Ther Activity             Bike/TM                          Gait Training                                       Modalities

## 2022-03-03 ENCOUNTER — OFFICE VISIT (OUTPATIENT)
Dept: PHYSICAL THERAPY | Facility: CLINIC | Age: 47
End: 2022-03-03
Payer: COMMERCIAL

## 2022-03-03 DIAGNOSIS — S92.255S CLOSED NONDISPLACED FRACTURE OF NAVICULAR BONE OF LEFT FOOT, SEQUELA: ICD-10-CM

## 2022-03-03 DIAGNOSIS — S92.252K CLOSED DISPLACED FRACTURE OF NAVICULAR BONE OF LEFT FOOT WITH NONUNION, SUBSEQUENT ENCOUNTER: Primary | ICD-10-CM

## 2022-03-03 PROCEDURE — 97112 NEUROMUSCULAR REEDUCATION: CPT

## 2022-03-03 PROCEDURE — 97140 MANUAL THERAPY 1/> REGIONS: CPT

## 2022-03-03 PROCEDURE — 97110 THERAPEUTIC EXERCISES: CPT

## 2022-03-03 NOTE — PROGRESS NOTES
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, subsequent encounter  S92 252K    2  Closed nondisplaced fracture of navicular bone of left foot, sequela  S92 255S                   Subjective: Pt  Weaning from boot and in a sneaker for a few hours a day around the house  Reports her L knee is bothering her more than her ankle  Objective: See treatment diary below      Assessment: Ankle is progressing well  Showed improved SL and tandem balance this visit compared to previous  Knee pain, tender to touch, at medial aspect, consistent with possible MCL irritation from walking mechanics in boot  Issued some hip and knee strengthening TE's with good tolerance  Resistive side stepping towards the L bothered the knee and well as valgus squat  Plan: Continue per plan of care  Precautions: Chronic kidney disease  EPOC: 22  HEP: Access Code: [email or web address]  URL: [email or web address]  Date: 2022  Prepared by: Malen Breeze    Exercises  · Ankle Circles on Wobble Board in Sitting - 1 x daily - 7 x weekly - 3 sets - 10 reps  · Towel Scrunches - 1 x daily - 7 x weekly - 3 sets - 10 reps  · Arch Lifting - 1 x daily - 7 x weekly - 3 sets - 10 reps  · Standing Weight Shift - 1 x daily - 7 x weekly - 3 sets - 10 reps  · Stride Stance Weight Shift - 1 x daily - 7 x weekly - 3 sets - 10 reps  · Alternating Step Forward with Support - 1 x daily - 7 x weekly - 3 sets - 10 reps  · Tandem Stance - 1 x daily - 7 x weekly - 3 sets - 10 reps  · Standing Heel Raise with Support - 1 x daily - 7 x weekly - 3 sets - 10 reps           Manuals  3/3          L ankle PROM  PF WE          STM L ankle  PF WE            '                        Neuro Re-Ed             Tandem 3x30" 3x30" 30"          Wt shifting AP and ML 10x ea 20x ea 20x ea          Static stepping anterior and lateral 10x ea 20x ea 20x ea                                                              Ther Ex             Ankle 4 way  SL INV/EV 20x ea GTB  x20 ea          Isometric  INV/EV   5"x20 ea          Ankle circles 10x 20x 20x          Foam rocking AP  30x 20x          Wall squat  2x10 10x2          STD hip 3 way  10x ea 20x ea          Step lunges  20x ea 20x ea          LAQ   3# x20          SLR   x20          VMO SLR   x20          S/L hip ABD   x20                       Ther Activity             Bike/TM   5 min                       Gait Training                                       Modalities

## 2022-03-10 ENCOUNTER — TELEPHONE (OUTPATIENT)
Dept: OBGYN CLINIC | Facility: HOSPITAL | Age: 47
End: 2022-03-10

## 2022-03-10 ENCOUNTER — APPOINTMENT (OUTPATIENT)
Dept: RADIOLOGY | Facility: CLINIC | Age: 47
End: 2022-03-10
Payer: COMMERCIAL

## 2022-03-10 ENCOUNTER — OFFICE VISIT (OUTPATIENT)
Dept: URGENT CARE | Facility: CLINIC | Age: 47
End: 2022-03-10
Payer: COMMERCIAL

## 2022-03-10 ENCOUNTER — OFFICE VISIT (OUTPATIENT)
Dept: PHYSICAL THERAPY | Facility: CLINIC | Age: 47
End: 2022-03-10
Payer: COMMERCIAL

## 2022-03-10 VITALS
DIASTOLIC BLOOD PRESSURE: 62 MMHG | SYSTOLIC BLOOD PRESSURE: 126 MMHG | HEIGHT: 69 IN | WEIGHT: 187 LBS | BODY MASS INDEX: 27.7 KG/M2 | RESPIRATION RATE: 18 BRPM | HEART RATE: 86 BPM | OXYGEN SATURATION: 98 % | TEMPERATURE: 97.6 F

## 2022-03-10 DIAGNOSIS — M79.672 LEFT FOOT PAIN: ICD-10-CM

## 2022-03-10 DIAGNOSIS — S92.252K CLOSED DISPLACED FRACTURE OF NAVICULAR BONE OF LEFT FOOT WITH NONUNION, SUBSEQUENT ENCOUNTER: Primary | ICD-10-CM

## 2022-03-10 DIAGNOSIS — S92.255S CLOSED NONDISPLACED FRACTURE OF NAVICULAR BONE OF LEFT FOOT, SEQUELA: ICD-10-CM

## 2022-03-10 DIAGNOSIS — M79.672 LEFT FOOT PAIN: Primary | ICD-10-CM

## 2022-03-10 PROCEDURE — 97110 THERAPEUTIC EXERCISES: CPT | Performed by: PHYSICAL THERAPIST

## 2022-03-10 PROCEDURE — 99213 OFFICE O/P EST LOW 20 MIN: CPT | Performed by: PHYSICIAN ASSISTANT

## 2022-03-10 PROCEDURE — 97140 MANUAL THERAPY 1/> REGIONS: CPT | Performed by: PHYSICAL THERAPIST

## 2022-03-10 PROCEDURE — 73630 X-RAY EXAM OF FOOT: CPT

## 2022-03-10 RX ORDER — VITAMIN E 268 MG
400 CAPSULE ORAL DAILY
COMMUNITY
Start: 2021-07-21 | End: 2022-07-21

## 2022-03-10 NOTE — TELEPHONE ENCOUNTER
Hello,  Please advise if the following patient can be forced onto the schedule:    Patient: Gadiel Higgins    : 1975    MRN: [id number]    Call back #: [phone]    Insurance: [facility]    Reason for appointment: patient had physical therapy today and injured her left foot during her session  Patient had surgery on 21 with Dr Vero Wilder  Patient states that she had an x-ray today at [address] [place] [address] and she may have a possible fracture  First available was not until 3/16/22 at [facility] doctor/location: Lachman/CALLUM      Thank you  E-mail sent to [facility] and practice admin

## 2022-03-10 NOTE — PROGRESS NOTES
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, subsequent encounter  S92 252K    2  Closed nondisplaced fracture of navicular bone of left foot, sequela  S92 255S                   Subjective: Notes feeling continued pain at medial ankle and medial knee  Objective: See treatment diary below      Assessment:  During initation of SL HR- eccentric lowering portion pt report increased sharp discomfort at 4th ray post performance  Was able to perform 1 SL HR with fair ability to start then with progression to eccentric lowering had increased discomfort  Following pain heat was applied and general exam of rays AP and ML 1-5 were assessed with little discomfort aside from 4th ray  Reports sxs worse with toe extension vs flexion  Was able to initiate WBing but without engagement of toes  Pt was offered wheel chair or assitive device for ambulation but pt was able to perform ambulation without assist   Denies any effusion post   Educated on use of CAM boot if needed- pt understood  Pt was able to initiate resisted PF supine against PT pressure without sharp discomfort but with weight through LE in standing increased pain at dorsal 4th ray  Provided patient with card to email with questions  Pt showed and expressed understanding and notes if it gets worse to try urgent care  Reports that her MD suggested her not use a CAM boot despite pain- has not been wearing boot for the last week  Plan: Continue per plan of care  Precautions: Chronic kidney disease  EPOC: 22  HEP: Access Code: [email or web address]  URL: [email or web address]  Date: 2022  Prepared by: Brooke Anthony    Exercises  · Ankle Circles on Wobble Board in Sitting - 1 x daily - 7 x weekly - 3 sets - 10 reps  · Towel Scrunches - 1 x daily - 7 x weekly - 3 sets - 10 reps  · Arch Lifting - 1 x daily - 7 x weekly - 3 sets - 10 reps  · Standing Weight Shift - 1 x daily - 7 x weekly - 3 sets - 10 reps  · Stride Stance Weight Shift - 1 x daily - 7 x weekly - 3 sets - 10 reps  · Alternating Step Forward with Support - 1 x daily - 7 x weekly - 3 sets - 10 reps  · Tandem Stance - 1 x daily - 7 x weekly - 3 sets - 10 reps  · Standing Heel Raise with Support - 1 x daily - 7 x weekly - 3 sets - 10 reps           Manuals 2/17 2/24 3/3 3/10         L ankle PROM  PF WE PF         STM L ankle  PF WE PF           25'  25'                      Neuro Re-Ed             Tandem 3x30" 3x30" 30" 10x         Wt shifting AP and ML 10x ea 20x ea 20x ea 10x         Static stepping anterior and lateral 10x ea 20x ea 20x ea                                                              Ther Ex             Ankle 4 way  SL INV/EV 20x ea GTB  x20 ea OTB 10x ea         Isometric  INV/EV   5"x20 ea          Ankle circles 10x 20x 20x 20x         Foam rocking AP  30x 20x          Wall squat  2x10 10x2          STD hip 3 way  10x ea 20x ea          Step lunges  20x ea 20x ea          LAQ   3# x20          SLR   x20          VMO SLR   x20          S/L hip ABD   x20                       Ther Activity             Bike/TM   5 min 7 min                      Gait Training                                       Modalities

## 2022-03-10 NOTE — PROGRESS NOTES
NAME: Chantell Herrera is a 40 y o  female  : 1975    MRN: [id number]      Assessment and Plan   Left foot pain [M79 672]  1  Left foot pain  XR foot 3+ vw left      x-ray left foot:  No obvious fractures noted but will follow-up on radiology read    Discussed with patient do the point tenderness she should remain on crutches and follow-up with Ortho  Asking about cam boot-discussed can wear it until you see ortho  Ice, elevate, rest and over-the-counter pain medication as needed  She acknowledges      Patient Instructions     Patient Instructions   Ice to the area 20 min, 3-4 x day   Elevate  OTC meds  F/u with ortho       Proceed to ER if symptoms worsen  Chief Complaint     Chief Complaint   Patient presents with    Foot Pain     left, While at PT today she felt a pain/ crack in her foot  History of Present Illness   Patient with history of left foot fracture with recent surgery to the area presents complaining of left foot pain  Reports she was doing physical therapy today and felt a sharp pain at the area just proximal to the 4th MTP joint  Reports she has been having pain to the area since  Some pain at rest but worse with touching the area or walking  Denies any numbness or tingling to the toes  Has not taken anything since this occurred  Reports today was her 1st day not in a Cam boot  Presents on crutches as she is unable to bear weight on this foot now  Review of Systems   Review of Systems   Constitutional: Negative for chills and fever  Musculoskeletal:        Left foot pain   Neurological: Negative for weakness and numbness           Current Medications       Current Outpatient Medications:     albuterol (2 5 mg/3 mL) 0 083 % nebulizer solution, Take 2 5 mg by nebulization every 6 (six) hours as needed for wheezing, Disp: , Rfl:     Ascorbic Acid (vitamin C) 1000 MG tablet, Take 1,000 mg by mouth daily, Disp: , Rfl:     Calcium Carbonate-Vit D-Min (CALCIUM 600+D3 PLUS MINERALS PO), Take by mouth, Disp: , Rfl:     cyanocobalamin (VITAMIN B-12) 1000 MCG tablet, Take 1,000 mcg by mouth every 30 (thirty) days, Disp: , Rfl:     ergocalciferol (VITAMIN D2) 50,000 units, Take 50,000 Units by mouth, Disp: , Rfl:     famotidine (PEPCID) 40 MG tablet, Take 40 mg by mouth daily at bedtime, Disp: , Rfl:     levonorgestrel (MIRENA) 20 MCG/24HR IUD, 1 each by Intrauterine route once, Disp: , Rfl:     omeprazole (PriLOSEC) 40 MG capsule, Take 40 mg by mouth daily, Disp: , Rfl:     vitamin E, tocopherol, 400 units capsule, Take 400 Units by mouth daily, Disp: , Rfl:     vitamin k 100 MCG tablet, Take 100 mcg by mouth daily, Disp: , Rfl:     VITAMINS A D C PO, Take 50,000 Units by mouth in the morning, Disp: , Rfl:     zinc gluconate 50 mg tablet, Take 100 mg by mouth daily, Disp: , Rfl:     aspirin (ECOTRIN) 325 mg EC tablet, Take 1 tablet (325 mg total) by mouth 2 (two) times a day, Disp: 84 tablet, Rfl: 0    iron dextran complex in sodium chloride 0 9 % 250 mL IVPB, Infuse 100 mg into a venous catheter once a week X 5 WEEKS TOTAL, Disp: , Rfl:     Current Allergies     Allergies as of 03/10/2022 - Reviewed 03/10/2022   Allergen Reaction Noted    Morphine Chest Pain 05/14/2018    Adhesive [medical tape] Blisters 05/14/2018    Nsaids Other (See Comments) 05/14/2018    Sulfacetamide Hives 05/14/2018    Lovenox [enoxaparin] Rash 05/14/2018              Past Medical History:   Diagnosis Date    Asthma     Chronic kidney disease     MICROSCOPIC HEMATURA    Fracture     FOOT    GERD (gastroesophageal reflux disease) anxiety    PONV (postoperative nausea and vomiting)     Pseudocholinesterase deficiency     Renal disorder        Past Surgical History:   Procedure Laterality Date    BARIATRIC SURGERY      CHOLECYSTECTOMY      ELBOW SURGERY      OOPHORECTOMY      AZ RECONST POST TIB TEND,EXCIS ACC TAR KASSI Left 12/27/2021    [place] [facility] [place]; Surgeon: Nikki Harris MD;  Location:  MAIN OR;  Service: Podiatry       No family history on file  Medications have been verified  The following portions of the patient's history were reviewed and updated as appropriate: allergies, current medications, past family history, past medical history, past social history, past surgical history and problem list     Objective   /62   Pulse 86   Temp 97 6 °F (36 4 °C)   Resp 18   Ht 5' 9" (1 753 m)   Wt 84 8 kg (187 lb)   SpO2 98%   BMI 27 62 kg/m²      Physical Exam     Physical Exam  Vitals and nursing note reviewed  Constitutional:       General: She is not in acute distress  Appearance: Normal appearance  She is not ill-appearing, toxic-appearing or diaphoretic  Cardiovascular:      Rate and Rhythm: Normal rate and regular rhythm  Pulmonary:      Effort: Pulmonary effort is normal  No respiratory distress  Musculoskeletal:      Comments: Left foot: No erythema, edema, ecchymosis or abrasions  Point tender to palpation at the 4th metatarsal just proximal to the MTP joint  No tenderness anywhere else in the foot or ankle  Able to flex and extend toes with severe pain  Pedal pulse 2 +  Sensation intact  Capillary refill to the toes less than 2 seconds   Skin:     Capillary Refill: Capillary refill takes less than 2 seconds  Neurological:      Mental Status: She is alert and oriented to person, place, and time

## 2022-03-11 ENCOUNTER — OFFICE VISIT (OUTPATIENT)
Dept: OBGYN CLINIC | Facility: CLINIC | Age: 47
End: 2022-03-11
Payer: COMMERCIAL

## 2022-03-11 VITALS — WEIGHT: 187 LBS | BODY MASS INDEX: 27.7 KG/M2 | HEIGHT: 69 IN

## 2022-03-11 DIAGNOSIS — S92.255S CLOSED NONDISPLACED FRACTURE OF NAVICULAR BONE OF LEFT FOOT, SEQUELA: Primary | ICD-10-CM

## 2022-03-11 DIAGNOSIS — M84.375A STRESS FRACTURE OF LEFT FOOT, INITIAL ENCOUNTER: ICD-10-CM

## 2022-03-11 PROCEDURE — 28470 CLTX METATARSAL FX WO MNP EA: CPT | Performed by: ORTHOPAEDIC SURGERY

## 2022-03-11 PROCEDURE — 99024 POSTOP FOLLOW-UP VISIT: CPT | Performed by: ORTHOPAEDIC SURGERY

## 2022-03-11 NOTE — PROGRESS NOTES
YAHIR Downing  Attending, Orthopaedic Surgery  Foot and Ankle  [facility]      ORTHOPAEDIC FOOT AND ANKLE POST-OP VISIT     Procedure:     Left Kidner procedure and modified Brostrum       Date of surgery:   12/27/21      PLAN  1  Weightbearing Status- WBAT operative extremity from cam boot to post op shoe for 4th MT neck stress fracture  2  DVT prophylaxis- completed  3  Continue PT per protocol  4  Discontinue the crutches  5  Pain control- OTC pain medication  6  RTC in 4 weeks  7  Xrays needed next visit - yes Weightbearing Foot    History of Present Illness:   Chief Complaint:   Chief Complaint   Patient presents with   Karol Bell is a 40 y o  female who is being seen for post-operative visit for the above procedure  Pain is well controlled and the patient has successfully transitioned to OTC pain medicines  Patient notes she was at PT while she was performing single limp raised when she felt a pop in the left foot over the 4th MT  She was advised by her therapist to stop weaning the boot and remain in the boot until seen and evaluated  Patient has continued weaning her boot into a supportive sneaker  Review of Systems:  General- denies fever/chills  Respiratory- denies cough or SOB  Cardio- denies chest pain or palpitations  GI- denies abdominal pain  Musculoskeletal- Negative except noted above  Skin- denies rashes or wounds    Physical Exam:   Ht 5' 9" (1 753 m)   Wt 84 8 kg (187 lb)   BMI 27 62 kg/m²   General/Constitutional: No apparent distress: well-nourished and well developed    Eyes: normal ocular motion  Lymphatic: No appreciable lymphadenopathy  Respiratory: Non-labored breathing  Vascular: No edema, swelling or tenderness, except as noted in detailed exam   Integumentary: No impressive skin lesions present, except as noted in detailed exam   Neuro: No ataxia or tremors noted  Psych: Normal mood and affect, oriented to person, place and time  Appropriate affect  Musculoskeletal: Normal, except as noted in detailed exam and in HPI  Examination    left        Incision Clean, dry, intact  Sutures Previously removed  Ecchymosis none    Swelling Mild    Sensation Intact to light touch throughout sural, saphenous, superficial peroneal, deep peroneal and medial/lateral plantar nerve distributions  [place] 5 07 filament (10g) testing deferred  Cardiovascular Brisk capillary refill < 2 seconds,intact DP and PT pulses    Special Tests None      Imaging Studies:   3 views of the left foot were taken, reviewed and interpreted independently that demonstrate stress fracture of 4th MT neck  Reviewed by me personally  Fracture / Dislocation Treatment    Date/Time: 3/11/2022 11:19 AM  Performed by: Maame Viramontes MD  Authorized by: Maame Viramontes MD     Patient Location:  Clinic  Other Assisting Provider: No    Verbal consent obtained?: Yes    Written consent obtained?: No    Risks and benefits: Risks, benefits and alternatives were discussed    Consent given by:  Patient  Patient states understanding of procedure being performed: Yes    Site marked: Yes    Patient identity confirmed:  Verbally with patient  Time out: Immediately prior to the procedure a time out was called    Injury location:  Foot  Location details:  Left foot  Injury type:  Fracture  Fracture type: fourth metatarsal    Neurovascular status: Neurovascularly intact    Distal perfusion: normal    Neurological function: normal    Range of motion: reduced    Local anesthesia used?: No    Manipulation performed?: No    Immobilization:  Other (comment) (post op shoe)  Neurovascular status: Neurovascularly intact    Distal perfusion: normal    Neurological function: normal    Range of motion: normal    Patient tolerance:  Patient tolerated the procedure well with no immediate complications          Sherral Mires Lachman, MD  Foot & Ankle Surgery   Department of [address] [place] [address]      I personally performed the service  Amanda Chroman Lachman, MD    Scribe Attestation    I,:  Ishaan Claros MA am acting as a scribe while in the presence of the attending physician :       I,:  Nicole Tee MD personally performed the services described in this documentation    as scribed in my presence :

## 2022-03-16 ENCOUNTER — OFFICE VISIT (OUTPATIENT)
Dept: PHYSICAL THERAPY | Facility: CLINIC | Age: 47
End: 2022-03-16
Payer: COMMERCIAL

## 2022-03-16 DIAGNOSIS — S92.255S CLOSED NONDISPLACED FRACTURE OF NAVICULAR BONE OF LEFT FOOT, SEQUELA: ICD-10-CM

## 2022-03-16 DIAGNOSIS — S92.252K CLOSED DISPLACED FRACTURE OF NAVICULAR BONE OF LEFT FOOT WITH NONUNION, SUBSEQUENT ENCOUNTER: Primary | ICD-10-CM

## 2022-03-16 PROCEDURE — 97140 MANUAL THERAPY 1/> REGIONS: CPT | Performed by: PHYSICAL THERAPIST

## 2022-03-16 PROCEDURE — 97110 THERAPEUTIC EXERCISES: CPT | Performed by: PHYSICAL THERAPIST

## 2022-03-16 NOTE — PROGRESS NOTES
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, subsequent encounter  S92 252K    2  Closed nondisplaced fracture of navicular bone of left foot, sequela  S92 255S        Start Time: 830  Stop Time: 920  Total time in clinic (min): 50 minutes    Subjective: Had consult with Dr Stacey Porras after going to Urgent Care following last session  Upon examination from Dr Stacey Porras x-rays were provided (-) for fx but from MD independent exam with palpation- suspected L sided 4th met neck stress fracture  Was placed in surgical shoe for 4 weeks WBAT  Pt feels less discomfort more sensitivity and general ache now  Pt despite news in good spirits and reports it was something she could not predict would happen  Reports she has been taking calcium and vitamin D supplements secondary to her laparoscopic procedure from a few years ago leading to her being malnourished according to pt report  Objective: See treatment diary below      Assessment: Able to initiate isometric 4 way and TBand 4 ways for L ankle  Denies sharp pain with performance of all TE  Noting + valgus stress testing at L knee also likely related to limited DF leading to valgus collapse of L knee and MCL related stress  + response to added hip and core strengthening to address remaining hip weakness  Will hold on WBing TE until pt returns from vacation  Plan: Continue per plan of care  Precautions: Chronic kidney disease  EPOC: 22  HEP: Access Code: [email or web address]  URL: [email or web address]  Date: 2022  Prepared by: Brooke Atlanta    Exercises  · Ankle Circles on Wobble Board in Sitting - 1 x daily - 7 x weekly - 3 sets - 10 reps  · Towel Scrunches - 1 x daily - 7 x weekly - 3 sets - 10 reps  · Arch Lifting - 1 x daily - 7 x weekly - 3 sets - 10 reps  · Standing Weight Shift - 1 x daily - 7 x weekly - 3 sets - 10 reps  · Stride Stance Weight Shift - 1 x daily - 7 x weekly - 3 sets - 10 reps  · Alternating Step Forward with Support - 1 x daily - 7 x weekly - 3 sets - 10 reps  · Tandem Stance - 1 x daily - 7 x weekly - 3 sets - 10 reps  · Standing Heel Raise with Support - 1 x daily - 7 x weekly - 3 sets - 10 reps           Manuals 2/17 2/24 3/3 3/10 3/16        L ankle PROM  PF WE PF PF        STM L ankle  PF WE PF PF          25'  25' 15                     Neuro Re-Ed             Tandem 3x30" 3x30" 30" 10x         Wt shifting AP and ML 10x ea 20x ea 20x ea 10x         Static stepping anterior and lateral 10x ea 20x ea 20x ea                       NSP march      20x        NSP 90/90 march     20x                     Ther Ex             Ankle 4 way  SL INV/EV 20x ea GTB  x20 ea OTB 10x ea GTB 20x        Isometric  INV/EV   5"x20 ea  4 way 5"x20        Ankle circles 10x 20x 20x 20x 20x        Foam rocking AP  30x 20x          Wall squat  2x10 10x2          STD hip 3 way  10x ea 20x ea  20x ea        Step lunges  20x ea 20x ea          LAQ   3# x20          SLR   x20          VMO SLR   x20  x20        S/L hip ABD   x20  x20 ea        Bridge walkout     10x ea        Ther Activity             Bike/TM   5 min 7 min                      Gait Training                                       Modalities

## 2022-03-17 ENCOUNTER — APPOINTMENT (OUTPATIENT)
Dept: PHYSICAL THERAPY | Facility: CLINIC | Age: 47
End: 2022-03-17
Payer: COMMERCIAL

## 2022-03-28 ENCOUNTER — OFFICE VISIT (OUTPATIENT)
Dept: PHYSICAL THERAPY | Facility: CLINIC | Age: 47
End: 2022-03-28
Payer: COMMERCIAL

## 2022-03-28 DIAGNOSIS — S92.255S CLOSED NONDISPLACED FRACTURE OF NAVICULAR BONE OF LEFT FOOT, SEQUELA: ICD-10-CM

## 2022-03-28 DIAGNOSIS — S92.252K CLOSED DISPLACED FRACTURE OF NAVICULAR BONE OF LEFT FOOT WITH NONUNION, SUBSEQUENT ENCOUNTER: Primary | ICD-10-CM

## 2022-03-28 PROCEDURE — 97035 APP MDLTY 1+ULTRASOUND EA 15: CPT | Performed by: PHYSICAL THERAPIST

## 2022-03-28 PROCEDURE — 97140 MANUAL THERAPY 1/> REGIONS: CPT | Performed by: PHYSICAL THERAPIST

## 2022-03-28 PROCEDURE — 97110 THERAPEUTIC EXERCISES: CPT | Performed by: PHYSICAL THERAPIST

## 2022-03-28 NOTE — PROGRESS NOTES
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, subsequent encounter  S92 252K    2  Closed nondisplaced fracture of navicular bone of left foot, sequela  S92 255S                   Subjective: Notes feeling better overall, continues to have pain with WBing and ambulation  Continues to have + residual joint discomfort at base of 4th toe  Objective: See treatment diary below      Assessment: Continues to have pain with toe flexion at 4th/5th digits vs 1st and 2nd  No pain with isometric INV/EV and PF/Df  Able to perform all Te without major difficulty  Will progress with strength of ankle as able  Trial of ultrasound with fair ability  Plan: Continue per plan of care  Precautions: Chronic kidney disease  EPOC: 22  HEP: Access Code: [email or web address]  URL: [email or web address]  Date: 2022  Prepared by: Alexander Abbott    Exercises  · Ankle Circles on Wobble Board in Sitting - 1 x daily - 7 x weekly - 3 sets - 10 reps  · Towel Scrunches - 1 x daily - 7 x weekly - 3 sets - 10 reps  · Arch Lifting - 1 x daily - 7 x weekly - 3 sets - 10 reps  · Standing Weight Shift - 1 x daily - 7 x weekly - 3 sets - 10 reps  · Stride Stance Weight Shift - 1 x daily - 7 x weekly - 3 sets - 10 reps  · Alternating Step Forward with Support - 1 x daily - 7 x weekly - 3 sets - 10 reps  · Tandem Stance - 1 x daily - 7 x weekly - 3 sets - 10 reps  · Standing Heel Raise with Support - 1 x daily - 7 x weekly - 3 sets - 10 reps           Manuals 2/17 2/24 3/3 3/10 3/16 3/28       L ankle PROM  PF WE PF PF PF       STM L ankle  PF WE PF PF PF         25'  25' 15 15'                    Neuro Re-Ed             Tandem 3x30" 3x30" 30" 10x         Wt shifting AP and ML 10x ea 20x ea 20x ea 10x         Static stepping anterior and lateral 10x ea 20x ea 20x ea                       NSP march      20x        NSP 90/90 march     20x                     Ther Ex             Ankle 4 way  SL INV/EV 20x ea GTB  x20 ea OTB 10x ea GTB 20x GTB 20x       Isometric  INV/EV   5"x20 ea  4 way 5"x20 4 way 5"x20       Ankle circles 10x 20x 20x 20x 20x 20x       Foam rocking AP  30x 20x          Wall squat  2x10 10x2          STD hip 3 way  10x ea 20x ea  20x ea        Step lunges  20x ea 20x ea          LAQ   3# x20          SLR   x20          VMO SLR   x20  x20        S/L hip ABD   x20  x20 ea        Bridge walkout     10x ea        Ther Activity             Bike/TM   5 min 7 min                      Gait Training                                       Modalities                   8 min   8 mA

## 2022-03-30 ENCOUNTER — HOSPITAL ENCOUNTER (OUTPATIENT)
Dept: ULTRASOUND IMAGING | Facility: HOSPITAL | Age: 47
Discharge: HOME | End: 2022-03-30
Payer: COMMERCIAL

## 2022-03-30 ENCOUNTER — OFFICE VISIT (OUTPATIENT)
Dept: PHYSICAL THERAPY | Facility: CLINIC | Age: 47
End: 2022-03-30
Payer: COMMERCIAL

## 2022-03-30 DIAGNOSIS — S92.252K CLOSED DISPLACED FRACTURE OF NAVICULAR BONE OF LEFT FOOT WITH NONUNION, SUBSEQUENT ENCOUNTER: Primary | ICD-10-CM

## 2022-03-30 DIAGNOSIS — E04.9 NONTOXIC GOITER, UNSPECIFIED: ICD-10-CM

## 2022-03-30 DIAGNOSIS — S92.255S CLOSED NONDISPLACED FRACTURE OF NAVICULAR BONE OF LEFT FOOT, SEQUELA: ICD-10-CM

## 2022-03-30 DIAGNOSIS — R07.0 PAIN IN THROAT: ICD-10-CM

## 2022-03-30 DIAGNOSIS — J35.1 HYPERTROPHY OF TONSILS: ICD-10-CM

## 2022-03-30 PROCEDURE — 97110 THERAPEUTIC EXERCISES: CPT | Performed by: PHYSICAL THERAPIST

## 2022-03-30 PROCEDURE — 97035 APP MDLTY 1+ULTRASOUND EA 15: CPT | Performed by: PHYSICAL THERAPIST

## 2022-03-30 PROCEDURE — 97140 MANUAL THERAPY 1/> REGIONS: CPT | Performed by: PHYSICAL THERAPIST

## 2022-03-30 PROCEDURE — 76536 US EXAM OF HEAD AND NECK: CPT

## 2022-03-30 NOTE — PROGRESS NOTES
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, subsequent encounter  S92 252K    2  Closed nondisplaced fracture of navicular bone of left foot, sequela  S92 255S                   Subjective: Notes having increased knee and ankle soreness after last session       Objective: See treatment diary below      Assessment: Fair tolerance to 4th and 5th met grade 1 joint oscillation today  Less discomfort with digit flex/ext compared to prior sessions  Continues to have improved tolerance and less sensitivity to performance of isometric ankle 4 way and all PROM  Still with medial pes anserine vs medial retinaculum sensitivity- continued pain with valgus stress test at 0 and 30 degrees- no pain with axial twisting to tibia r/o meniscal pathology  Able to perform all hip strength/stability TE  Will await consult with MD on  to assess healing response  Plan: Continue per plan of care  Precautions: Chronic kidney disease  EPOC: 22  HEP: Access Code: [email or web address]  URL: [email or web address]  Date: 2022  Prepared by: Spencer Greening    Exercises  · Ankle Circles on Wobble Board in Sitting - 1 x daily - 7 x weekly - 3 sets - 10 reps  · Towel Scrunches - 1 x daily - 7 x weekly - 3 sets - 10 reps  · Arch Lifting - 1 x daily - 7 x weekly - 3 sets - 10 reps  · Standing Weight Shift - 1 x daily - 7 x weekly - 3 sets - 10 reps  · Stride Stance Weight Shift - 1 x daily - 7 x weekly - 3 sets - 10 reps  · Alternating Step Forward with Support - 1 x daily - 7 x weekly - 3 sets - 10 reps  · Tandem Stance - 1 x daily - 7 x weekly - 3 sets - 10 reps  · Standing Heel Raise with Support - 1 x daily - 7 x weekly - 3 sets - 10 reps           Manuals 2/17 2/24 3/3 3/10 3/16 3/28 3/30      L ankle PROM  PF WE PF PF PF PF      STM L ankle  PF WE PF PF PF PF        25'  25' 15 15' 10'                   Neuro Re-Ed             Tandem 3x30" 3x30" 30" 10x         Wt shifting AP and ML 10x ea 20x ea 20x ea 10x         Static stepping anterior and lateral 10x ea 20x ea 20x ea                       NSP march      20x        NSP 90/90 march     20x                     Ther Ex             Ankle 4 way  SL INV/EV 20x ea GTB  x20 ea OTB 10x ea GTB 20x GTB 20x GTB 20x      Isometric  INV/EV   5"x20 ea  4 way 5"x20 4 way 5"x20 4 way 5"x20      Ankle circles 10x 20x 20x 20x 20x 20x 20x      Foam rocking AP  30x 20x          Wall squat  2x10 10x2          STD hip 3 way  10x ea 20x ea  20x ea        Step lunges  20x ea 20x ea          LAQ   3# x20    x30       SLR   x20    x20      VMO SLR   x20  x20  x20      S/L hip ABD   x20  x20 ea  x20      Bridge walkout     10x ea  PB 10x      Ther Activity             Bike/TM   5 min 7 min   5 min bike                   Gait Training                                       Modalities             US      8 min   8 mA 15 min   8 mA

## 2022-04-04 ENCOUNTER — OFFICE VISIT (OUTPATIENT)
Dept: PHYSICAL THERAPY | Facility: CLINIC | Age: 47
End: 2022-04-04
Payer: COMMERCIAL

## 2022-04-04 DIAGNOSIS — S92.255S CLOSED NONDISPLACED FRACTURE OF NAVICULAR BONE OF LEFT FOOT, SEQUELA: ICD-10-CM

## 2022-04-04 DIAGNOSIS — S92.252K CLOSED DISPLACED FRACTURE OF NAVICULAR BONE OF LEFT FOOT WITH NONUNION, SUBSEQUENT ENCOUNTER: Primary | ICD-10-CM

## 2022-04-04 PROCEDURE — 97112 NEUROMUSCULAR REEDUCATION: CPT | Performed by: PHYSICAL THERAPIST

## 2022-04-04 PROCEDURE — 97110 THERAPEUTIC EXERCISES: CPT | Performed by: PHYSICAL THERAPIST

## 2022-04-04 NOTE — PROGRESS NOTES
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, subsequent encounter  S92 252K    2  Closed nondisplaced fracture of navicular bone of left foot, sequela  S92 255S                   Subjective: Notes feeling okay, F/U with MD this   Notes she has been trying to walk around her house at night in her slippers at times vs barefoot or with surgical shoe  Notes continued less pain overall  Objective: See treatment diary below      Assessment: Continues to have pain with performance of WBing on forefoot but less after intrinsic activation in all planes  Trial of ambulation with shoe- noted antalgic gait patterns with L knee buckling and limited toe off/push off as a result of weakness and poor recruitment of PFers  Less antalgic patterns post activation of intrinsics  Plan: Continue per plan of care  Precautions: Chronic kidney disease  EPOC: 22  HEP: Access Code: [email or web address]  URL: [email or web address]  Date: 2022  Prepared by: Gera Mcpherson    Exercises  · Ankle Circles on Wobble Board in Sitting - 1 x daily - 7 x weekly - 3 sets - 10 reps  · Towel Scrunches - 1 x daily - 7 x weekly - 3 sets - 10 reps  · Arch Lifting - 1 x daily - 7 x weekly - 3 sets - 10 reps  · Standing Weight Shift - 1 x daily - 7 x weekly - 3 sets - 10 reps  · Stride Stance Weight Shift - 1 x daily - 7 x weekly - 3 sets - 10 reps  · Alternating Step Forward with Support - 1 x daily - 7 x weekly - 3 sets - 10 reps  · Tandem Stance - 1 x daily - 7 x weekly - 3 sets - 10 reps  · Standing Heel Raise with Support - 1 x daily - 7 x weekly - 3 sets - 10 reps           Manuals 2/17 2/24 3/3 3/10 3/16 3/28 3/30 4/4     L ankle PROM  PF WE PF PF PF PF NV     STM L ankle  PF WE PF PF PF PF NV       25'  25' 15 15' 10'                   Neuro Re-Ed             Tandem 3x30" 3x30" 30" 10x         Wt shifting AP and ML 10x ea 20x ea 20x ea 10x    10x ea     Static stepping anterior and lateral 10x ea 20x ea 20x ea     20x                   NSP march      20x   20x     NSP 90/90 march     20x   20x     NSP heel slides        20x     Ther Ex             Ankle 4 way  SL INV/EV 20x ea GTB  x20 ea OTB 10x ea GTB 20x GTB 20x GTB 20x GTB 20x     Isometric  INV/EV   5"x20 ea  4 way 5"x20 4 way 5"x20 4 way 5"x20 4 way Purple TB 20x ea     Ankle circles 10x 20x 20x 20x 20x 20x 20x 20x seated and standing     Foam rocking AP  30x 20x          Wall squat  2x10 10x2          STD hip 3 way  10x ea 20x ea  20x ea        Step lunges  20x ea 20x ea          LAQ   3# x20    x30       SLR   x20    x20 x20     VMO SLR   x20  x20  x20 x20     S/L hip ABD   x20  x20 ea  x20 x20     Bridge walkout     10x ea  PB 10x PB 10x      Ther Activity             Bike/TM   5 min 7 min   5 min bike 5 min bike                  Gait Training                                       Modalities             US      8 min   8 mA 15 min   8 mA

## 2022-04-06 ENCOUNTER — EVALUATION (OUTPATIENT)
Dept: PHYSICAL THERAPY | Facility: CLINIC | Age: 47
End: 2022-04-06
Payer: COMMERCIAL

## 2022-04-06 DIAGNOSIS — S92.255S CLOSED NONDISPLACED FRACTURE OF NAVICULAR BONE OF LEFT FOOT, SEQUELA: ICD-10-CM

## 2022-04-06 DIAGNOSIS — S92.252K CLOSED DISPLACED FRACTURE OF NAVICULAR BONE OF LEFT FOOT WITH NONUNION, SUBSEQUENT ENCOUNTER: Primary | ICD-10-CM

## 2022-04-06 PROCEDURE — 97110 THERAPEUTIC EXERCISES: CPT | Performed by: PHYSICAL THERAPIST

## 2022-04-06 PROCEDURE — 97140 MANUAL THERAPY 1/> REGIONS: CPT | Performed by: PHYSICAL THERAPIST

## 2022-04-06 NOTE — PROGRESS NOTES
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 Re-assessment 22:  Zabrina has attended 9 visits since the initiation of PT and reports a 50% GROC  Clinically demonstrates improved L ankle ROM, strength and reduced effusion despite stress fx set back ~ 1 month ago  Zabrina demonstrates improved L ankle ROM as well as improved L ankle strength in all planes but still highly limited with L ankle PF strength and push off with ambulation leading to antalgic and labored gait  She has also been hindered from continued 4 th metatarsal stress and discomfort leading to continued pain and immobilization  Due to these above limitations she will continue to benefit from skilled OPPT to address her remaining impairments, achieve established goals and return to PLOF pain-free     Impairments: abnormal coordination, abnormal muscle firing, abnormal or restricted ROM, abnormal movement, activity intolerance, difficulty understanding, lacks appropriate home exercise program, pain with function, scapular dyskinesis, weight-bearing intolerance and poor posture     Symptom irritability: moderateUnderstanding of Dx/Px/POC: good   Prognosis: good    Goals  Short Term Goals (4 weeks)  1 ) Establish independence with HEP- MET  2 ) Decrease subjective pain levels from NPRS at least to 2-5/10 at rest and with activity- partially met   3 ) Improve L ankle ROM at least 5-10 degrees into all planes to allow for improved ease of movement with less guarding- MET    Long Term Goals (8 weeks)  1 ) Improve L ankle ROM to WNL in all planes to restore normal movement with ADLs and function- partially MET  2 ) Improve L ankle strength to 5/5 in all planes in order to return to pain-free ADLs and function- Partially MET  3 ) Improve FOTO score at least to 75 points showing improved self reported disability - not met  4 ) Reduce L ankle effusion at least  5 to 1 cm - MET    Plan  Plan details: Continue POC for L ankle ROM, proprioception and progressive WBing; monitor sxs and progress as able  Patient would benefit from: skilled physical therapy and PT eval  Planned modality interventions: cryotherapy, biofeedback, electrical stimulation/Russian stimulation and TENS  Planned therapy interventions: abdominal trunk stabilization, activity modification, ADL retraining, ADL training, balance, balance/weight bearing training, behavior modification, IADL retraining, joint mobilization, manual therapy, motor coordination training, muscle pump exercises, neuromuscular re-education, orthotic management and training, patient education, postural training, self care, sensory integrative techniques, strengthening, stretching, therapeutic activities, therapeutic exercise, therapeutic training, graded exercise, graded activity, gait training, functional ROM exercises, flexibility, coordination, body mechanics training, graded motor and home exercise program  Frequency: 2x week  Duration in visits: 16  Duration in weeks: 8  Plan of Care beginning date: 4/6/2022  Plan of Care expiration date: 6/8/2022  Treatment plan discussed with: patient        Subjective Evaluation    History of Present Illness  Date of onset: 5/1/2021  Date of surgery: 12/27/2021  Mechanism of injury: Michael Llanos is a 40 y o  female who presents with increased L navicular fracture starting ~ 9 months ago with after falling down stairs with an inversion sprain  Was able to walk initially  Notes going to [facility] and was (-) for malleolar and was then referred to Ortho and sent to [facility] PT -2 months without improvement  Tried manage conservatively for a few weeks in a boot without improvement  Then was referred for surgical consult in November and decided for 12/27/22 for surgery  Had a lateral ankle reconstruction as a result of + anterior drawer  Was sent home the same day  Post surgical splint for 3 weeks then hard cast for 4 weeks NWBing which was removed on 2/11/22  Now 50% WBing then progression to WBAT  Had F/U with MD on 2/11/22  Denies any night pain or changes in bowel or bladder function  Denies any NT signs or sxs aside from incisional pain  F/U with MD in 6 weeks  Wishes to return to PLOF pain-free walking at the gym and perform basic ADLs  Re-assessment 4/6/22:  Zabrina has attended 9 visits since the initiation of PT and reports a 50% GROC  Reports improved L ankle mobility but still with hard stop at lateral region of ankle with active INV  Notes feeling improved stability in her ankle overall  Reports having difficulty with stairs due to mobility deficits and has resorted to going down side ways and limited ability to go up and down hills/inclines    Still having pain and discomfort with walking due to 4th met stress fx- still waiting for clearance from MD to progress from surgical shoe on 22  PSFS:  Push band equipment- 1/10; Wear high heels 0/10; Walk a mile:   score for PSFS  Not a recurrent problem   Quality of life: good    Pain  Current pain ratin  At best pain ratin  At worst pain rating: 3  Location: L ankle  Quality: sharp, pulling and dull ache  Relieving factors: rest, support, heat and medications  Aggravating factors: walking, standing and sitting  Progression: worsening    Social Support  Steps to enter house: yes  2  Stairs in house: yes   15  Lives in: multiple-level home  Lives with: spouse and young children    Employment status: not working  Exercise history: Gym occasionally   Life stress: No       Diagnostic Tests  X-ray: abnormal  Treatments  Previous treatment: physical therapy  Current treatment: immobilization and physical therapy  Patient Goals  Patient goals for therapy: decreased edema, decreased pain, improved balance, increased motion, increased strength, independence with ADLs/IADLs and return to sport/leisure activities  Patient goal: Get back to regular life        Objective     Active Range of Motion   Left Ankle/Foot   Dorsiflexion (kf): 19 degrees   Plantar flexion: 59 degrees   Inversion: 35 degrees   Eversion: 15 degrees     Right Ankle/Foot   Dorsiflexion (kf): 17 degrees   Plantar flexion: 55 degrees   Inversion: 35 degrees   Eversion: 21 degrees     Strength/Myotome Testing     Left Ankle/Foot   Dorsiflexion: 4+  Plantar flexion: 3  Inversion: 4+  Eversion: 5    Right Ankle/Foot   Dorsiflexion: 5  Plantar flexion: 5  Inversion: 5  Eversion: 5    Tests     Right Ankle/Foot   Negative for anterior drawer  Swelling   Left Ankle/Foot   Figure 8: 51 cm  Malleoli: 25 cm    Right Ankle/Foot   Figure 8: 49 5 cm  Malleoli: 22 cm    Functional Assessment      Squat    Pain, left valgus and trunk lean right       Single Leg Stance   Left: 0 seconds  Right: 30 seconds    General Comments: Ankle/Foot Comments   Mid calf girth ; 36 cm L vs 38 cm R              Precautions: Chronic kidney disease  EPOC: 4/21/22  HEP: Access Code: [email or web address]  URL: [email or web address]  Date: 02/17/2022  Prepared by: Desi Trinidad    Exercises  · Ankle Circles on Wobble Board in Sitting - 1 x daily - 7 x weekly - 3 sets - 10 reps  · Towel Scrunches - 1 x daily - 7 x weekly - 3 sets - 10 reps  · Arch Lifting - 1 x daily - 7 x weekly - 3 sets - 10 reps  · Standing Weight Shift - 1 x daily - 7 x weekly - 3 sets - 10 reps  · Stride Stance Weight Shift - 1 x daily - 7 x weekly - 3 sets - 10 reps  · Alternating Step Forward with Support - 1 x daily - 7 x weekly - 3 sets - 10 reps  · Tandem Stance - 1 x daily - 7 x weekly - 3 sets - 10 reps  · Standing Heel Raise with Support - 1 x daily - 7 x weekly - 3 sets - 10 reps           Manuals 2/17 2/24 3/3 3/10 3/16 3/28 3/30 4/4 4/6    L ankle PROM  PF WE PF PF PF PF NV PF    STM L ankle  PF WE PF PF PF PF NV PF    Re-assess         PF      25'  25' 15 15' 10'  25'                 Neuro Re-Ed             Tandem 3x30" 3x30" 30" 10x         Wt shifting AP and ML 10x ea 20x ea 20x ea 10x    10x ea     Static stepping anterior and lateral 10x ea 20x ea 20x ea     20x                   NSP march      20x   20x     NSP 90/90 march     20x   20x     NSP heel slides        20x     Ther Ex             Ankle 4 way  SL INV/EV 20x ea GTB  x20 ea OTB 10x ea GTB 20x GTB 20x GTB 20x GTB 20x GTB 20x    Isometric  INV/EV   5"x20 ea  4 way 5"x20 4 way 5"x20 4 way 5"x20 4 way Purple TB 20x ea 20x    Ankle circles 10x 20x 20x 20x 20x 20x 20x 20x seated and standing 20x     Foam rocking AP  30x 20x          Wall squat  2x10 10x2          STD hip 3 way  10x ea 20x ea  20x ea        Step lunges  20x ea 20x ea          LAQ   3# x20    x30       SLR   x20    x20 x20     VMO SLR   x20  x20  x20 x20     S/L hip ABD   x20  x20 ea  x20 x20     Bridge walkout     10x ea  PB 10x PB 10x Ther Activity             Bike/TM   5 min 7 min   5 min bike 5 min bike 5 min                 Gait Training                                       Modalities             US      8 min   8 mA 15 min   8 mA

## 2022-04-08 ENCOUNTER — OFFICE VISIT (OUTPATIENT)
Dept: OBGYN CLINIC | Facility: CLINIC | Age: 47
End: 2022-04-08
Payer: COMMERCIAL

## 2022-04-08 ENCOUNTER — APPOINTMENT (OUTPATIENT)
Dept: RADIOLOGY | Facility: CLINIC | Age: 47
End: 2022-04-08
Payer: COMMERCIAL

## 2022-04-08 VITALS
HEIGHT: 69 IN | DIASTOLIC BLOOD PRESSURE: 70 MMHG | WEIGHT: 187 LBS | BODY MASS INDEX: 27.7 KG/M2 | SYSTOLIC BLOOD PRESSURE: 114 MMHG

## 2022-04-08 DIAGNOSIS — S92.255S CLOSED NONDISPLACED FRACTURE OF NAVICULAR BONE OF LEFT FOOT, SEQUELA: Primary | ICD-10-CM

## 2022-04-08 DIAGNOSIS — S92.255S CLOSED NONDISPLACED FRACTURE OF NAVICULAR BONE OF LEFT FOOT, SEQUELA: ICD-10-CM

## 2022-04-08 PROCEDURE — 73630 X-RAY EXAM OF FOOT: CPT

## 2022-04-08 PROCEDURE — 99024 POSTOP FOLLOW-UP VISIT: CPT | Performed by: ORTHOPAEDIC SURGERY

## 2022-04-08 NOTE — PROGRESS NOTES
YAHIR Wilder  Attending, Orthopaedic Surgery  Foot and Ankle  [facility]      ORTHOPAEDIC FOOT AND ANKLE POST-OP VISIT     Procedure:     Left Kidner procedure and modified Brostrom       Date of surgery:   12/27/21      PLAN  1  Weightbearing Status- WBAT operative extremity  2  No restrictions from ortho standpoint  3  Continue PT until discharged by the therapist  4  Pain control- OTC pain medication  5  RTC in 3 week(s)  6  Xrays needed next visit - yes Weightbearing Foot    History of Present Illness:   Chief Complaint: Left foot surgery  Suzy Block is a 40 y o  female who is being seen for post-operative visit for the above procedure  Pain is well controlled and the patient has successfully transitioned to OTC pain medicines  she is taking ASA 325mg BID for DVT prophylaxis  Patient has been WBAT in a CAM boot  Review of Systems:  General- denies fever/chills  Respiratory- denies cough or SOB  Cardio- denies chest pain or palpitations  GI- denies abdominal pain  Musculoskeletal- Negative except noted above  Skin- denies rashes or wounds    Physical Exam:   /70   Ht 5' 9" (1 753 m)   Wt 84 8 kg (187 lb)   BMI 27 62 kg/m²   General/Constitutional: No apparent distress: well-nourished and well developed  Eyes: normal ocular motion  Lymphatic: No appreciable lymphadenopathy  Respiratory: Non-labored breathing  Vascular: No edema, swelling or tenderness, except as noted in detailed exam   Integumentary: No impressive skin lesions present, except as noted in detailed exam   Neuro: No ataxia or tremors noted  Psych: Normal mood and affect, oriented to person, place and time  Appropriate affect  Musculoskeletal: Normal, except as noted in detailed exam and in HPI  Examination    left        Incision Clean, dry, intact  Sutures Previously removed      Ecchymosis none    Swelling Mild    Sensation Intact to light touch throughout sural, saphenous, superficial peroneal, deep peroneal and medial/lateral plantar nerve distributions  [place]-Jane 5 07 filament (10g) testing deferred  Cardiovascular Brisk capillary refill < 2 seconds,intact DP and PT pulses    Special Tests None      Imaging Studies:   3 views of the left foot were taken, reviewed and interpreted independently that demonstrate no fractures or dislocation  Reviewed by me personally  Ane Barlow Lachman, MD  Foot & Ankle Surgery   Department of [address] [place] [address]      I personally performed the service  Ane Barlow Lachman, MD

## 2022-04-12 ENCOUNTER — OFFICE VISIT (OUTPATIENT)
Dept: PHYSICAL THERAPY | Facility: CLINIC | Age: 47
End: 2022-04-12
Payer: COMMERCIAL

## 2022-04-12 DIAGNOSIS — S92.255S CLOSED NONDISPLACED FRACTURE OF NAVICULAR BONE OF LEFT FOOT, SEQUELA: ICD-10-CM

## 2022-04-12 DIAGNOSIS — S92.252K CLOSED DISPLACED FRACTURE OF NAVICULAR BONE OF LEFT FOOT WITH NONUNION, SUBSEQUENT ENCOUNTER: Primary | ICD-10-CM

## 2022-04-12 PROCEDURE — 97112 NEUROMUSCULAR REEDUCATION: CPT

## 2022-04-12 PROCEDURE — 97110 THERAPEUTIC EXERCISES: CPT

## 2022-04-12 NOTE — PROGRESS NOTES
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, subsequent encounter  S92 252K    2  Closed nondisplaced fracture of navicular bone of left foot, sequela  S92 255S                   Subjective: Pt reports her L foot/ankle was a little sore yesterday, but is feeling good overall  Notes she saw her doctor recently who released her from his care, and was told to continue with physical therapy  Objective: See treatment diary below      Assessment: Tolerated treatment well  Continued with program as outlined below, progressing WB'ing/balance interventions as tolerated  Most challenged with static A/P rockerboard balance  Was able to perform all [address] activities with no complaints of increase in pain  Patient would benefit from continued PT  Plan: Continue per plan of care  Precautions: Chronic kidney disease  EPOC: 22  HEP: Access Code: [email or web address]  URL: [email or web address]  Date: 2022  Prepared by: Tere Choudhary    Exercises  · Ankle Circles on Wobble Board in Sitting - 1 x daily - 7 x weekly - 3 sets - 10 reps  · Towel Scrunches - 1 x daily - 7 x weekly - 3 sets - 10 reps  · Arch Lifting - 1 x daily - 7 x weekly - 3 sets - 10 reps  · Standing Weight Shift - 1 x daily - 7 x weekly - 3 sets - 10 reps  · Stride Stance Weight Shift - 1 x daily - 7 x weekly - 3 sets - 10 reps  · Alternating Step Forward with Support - 1 x daily - 7 x weekly - 3 sets - 10 reps  · Tandem Stance - 1 x daily - 7 x weekly - 3 sets - 10 reps  · Standing Heel Raise with Support - 1 x daily - 7 x weekly - 3 sets - 10 reps           Manuals 2/17 2/24 3/3 3/10 3/16 3/28 3/30 4/4 4/6 4/12   L ankle PROM  PF WE PF PF PF PF NV PF AFB   STM L ankle  PF WE PF PF PF PF NV PF AFB   Re-assess         PF 22'  25' 15 15' 10'  25' 10'                Neuro Re-Ed             Tandem 3x30" 3x30" 30" 10x      Floor  1x30"    Foam  2x30"   SLS          Floor  1x20"    Foam  2x20"   Rockerboard balance          A/P  2x30"   Wt shifting AP and ML 10x ea 20x ea 20x ea 10x    10x ea     Static stepping anterior and lateral 10x ea 20x ea 20x ea     20x                   NSP march      20x   20x     NSP 90/90 march     20x   20x     NSP heel slides        20x     Ther Ex             Ankle 4 way  SL INV/EV 20x ea GTB  x20 ea OTB 10x ea GTB 20x GTB 20x GTB 20x GTB 20x GTB 20x GTB 20x   Isometric  INV/EV   5"x20 ea  4 way 5"x20 4 way 5"x20 4 way 5"x20 4 way Purple TB 20x ea 20x 4 way 5"x20   Ankle circles 10x 20x 20x 20x 20x 20x 20x 20x seated and standing 20x  20x   Foam rocking AP  30x 20x          Wall squat  2x10 10x2          STD hip 3 way  10x ea 20x ea  20x ea        Step lunges  20x ea 20x ea          LAQ   3# x20    x30       SLR   x20    x20 x20     VMO SLR   x20  x20  x20 x20  x20   S/L hip ABD   x20  x20 ea  x20 x20  + ADD  x20 ea   Bridge walkout     10x ea  PB 10x PB 10x   PB 10x    Ther Activity             Bike/TM   5 min 7 min   5 min bike 5 min bike 5 min 5 min bike                Gait Training                                       Modalities             US      8 min   8 mA 15 min   8 mA

## 2022-04-14 ENCOUNTER — OFFICE VISIT (OUTPATIENT)
Dept: PHYSICAL THERAPY | Facility: CLINIC | Age: 47
End: 2022-04-14
Payer: COMMERCIAL

## 2022-04-14 DIAGNOSIS — S92.252K CLOSED DISPLACED FRACTURE OF NAVICULAR BONE OF LEFT FOOT WITH NONUNION, SUBSEQUENT ENCOUNTER: Primary | ICD-10-CM

## 2022-04-14 DIAGNOSIS — S92.255S CLOSED NONDISPLACED FRACTURE OF NAVICULAR BONE OF LEFT FOOT, SEQUELA: ICD-10-CM

## 2022-04-14 PROCEDURE — 97110 THERAPEUTIC EXERCISES: CPT

## 2022-04-14 PROCEDURE — 97112 NEUROMUSCULAR REEDUCATION: CPT

## 2022-04-14 NOTE — PROGRESS NOTES
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, subsequent encounter  S92 252K    2  Closed nondisplaced fracture of navicular bone of left foot, sequela  S92 255S        Start Time: 1031  Stop Time: 1125  Total time in clinic (min): 54 minutes    Subjective: Patient reports she is feeling a bit more sore today at her fracture site, thinking it is the upcoming weather (t-storms coming this afternoon)  Objective: See treatment diary below      Assessment: Tolerated treatment well  Pt noted that she no longer had pain in her medial arch region (where it was worked on last time with [facility]) but still had pain laterally  Patient exhibited good technique with therapeutic exercises and would benefit from continued PT  Plan: Continue per plan of care  Precautions: Chronic kidney disease  EPOC: 22  HEP: Access Code: [email or web address]  URL: [email or web address]  Date: 2022  Prepared by: Nayan Sands    Exercises  · Ankle Circles on Wobble Board in Sitting - 1 x daily - 7 x weekly - 3 sets - 10 reps  · Towel Scrunches - 1 x daily - 7 x weekly - 3 sets - 10 reps  · Arch Lifting - 1 x daily - 7 x weekly - 3 sets - 10 reps  · Standing Weight Shift - 1 x daily - 7 x weekly - 3 sets - 10 reps  · Stride Stance Weight Shift - 1 x daily - 7 x weekly - 3 sets - 10 reps  · Alternating Step Forward with Support - 1 x daily - 7 x weekly - 3 sets - 10 reps  · Tandem Stance - 1 x daily - 7 x weekly - 3 sets - 10 reps  · Standing Heel Raise with Support - 1 x daily - 7 x weekly - 3 sets - 10 reps           Manuals 4/14 2/24 3/3 3/10 3/16 3/28 3/30 4/4 4/6 4/12   L ankle PROM NA PF WE PF PF PF PF NV PF AFB   STM L ankle NA PF WE PF PF PF PF NV PF AFB   Re-assess         PF     5' 25'  25' 15 15' 10'  25' 10' Neuro Re-Ed             Tandem Floor  1x30"    Foam  2x30" 3x30" 30" 10x      Floor  1x30"    Foam  2x30"   SLS Floor 1x20"    Foam 2x20"         Floor  1x20"    Foam  2x20"   Rockerboard balance A/P 2x30"         A/P  2x30"   Wt shifting AP and ML  20x ea 20x ea 10x    10x ea     Static stepping anterior and lateral  20x ea 20x ea     20x                   NSP march      20x   20x     NSP 90/90 march     20x   20x     NSP heel slides        20x     Ther Ex             Ankle 4 way GTB 20x SL INV/EV 20x ea GTB  x20 ea OTB 10x ea GTB 20x GTB 20x GTB 20x GTB 20x GTB 20x GTB 20x   Isometric  INV/EV 4 way 5"x20  5"x20 ea  4 way 5"x20 4 way 5"x20 4 way 5"x20 4 way Purple TB 20x ea 20x 4 way 5"x20   Ankle circles 20x 20x 20x 20x 20x 20x 20x 20x seated and standing 20x  20x   Foam rocking AP  30x 20x          Wall squat  2x10 10x2          STD hip 3 way  10x ea 20x ea  20x ea        Step lunges  20x ea 20x ea          LAQ   3# x20    x30       SLR   x20    x20 x20     VMO SLR x20  x20  x20  x20 x20  x20   S/L hip ABD + ADD x20   x20  x20 ea  x20 x20  + ADD  x20 ea   Bridge walkout PB 20x    10x ea  PB 10x PB 10x   PB 10x    Ther Activity             Bike/TM 5 min bike  5 min 7 min   5 min bike 5 min bike 5 min 5 min bike                Gait Training                                       Modalities             US      8 min   8 mA 15 min   8 mA

## 2022-04-19 ENCOUNTER — OFFICE VISIT (OUTPATIENT)
Dept: PHYSICAL THERAPY | Facility: CLINIC | Age: 47
End: 2022-04-19
Payer: COMMERCIAL

## 2022-04-19 DIAGNOSIS — S92.252K CLOSED DISPLACED FRACTURE OF NAVICULAR BONE OF LEFT FOOT WITH NONUNION, SUBSEQUENT ENCOUNTER: Primary | ICD-10-CM

## 2022-04-19 DIAGNOSIS — S92.255S CLOSED NONDISPLACED FRACTURE OF NAVICULAR BONE OF LEFT FOOT, SEQUELA: ICD-10-CM

## 2022-04-19 PROCEDURE — 97140 MANUAL THERAPY 1/> REGIONS: CPT | Performed by: PHYSICAL THERAPIST

## 2022-04-19 PROCEDURE — 97110 THERAPEUTIC EXERCISES: CPT | Performed by: PHYSICAL THERAPIST

## 2022-04-19 NOTE — PROGRESS NOTES
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, subsequent encounter  S92 252K    2  Closed nondisplaced fracture of navicular bone of left foot, sequela  S92 255S                   Subjective: Notes feeling improved overall pain, just discomfort with WBing on her toes still  Objective: See treatment diary below      Assessment: With any balance on foam or bosu, was able to perform without major discomfort or pain  Most pain noted with 3rd and 4th toe flexion with PF- suspected that with extensor digitorum end range strength was having dorsal foot pain due to tensile restriction  Trial of IASTM and continued STM to dorsal structures at distal and mid rays with good response  Was able to perform WBing and ambulation with good performance post       Plan: Continue per plan of care  Precautions: Chronic kidney disease  EPOC: 22  HEP: Access Code: [email or web address]  URL: [email or web address]  Date: 2022  Prepared by: Arti Atkins    Exercises  · Ankle Circles on Wobble Board in Sitting - 1 x daily - 7 x weekly - 3 sets - 10 reps  · Towel Scrunches - 1 x daily - 7 x weekly - 3 sets - 10 reps  · Arch Lifting - 1 x daily - 7 x weekly - 3 sets - 10 reps  · Standing Weight Shift - 1 x daily - 7 x weekly - 3 sets - 10 reps  · Stride Stance Weight Shift - 1 x daily - 7 x weekly - 3 sets - 10 reps  · Alternating Step Forward with Support - 1 x daily - 7 x weekly - 3 sets - 10 reps  · Tandem Stance - 1 x daily - 7 x weekly - 3 sets - 10 reps  · Standing Heel Raise with Support - 1 x daily - 7 x weekly - 3 sets - 10 reps           Manuals 4/14 4/19 3/3 3/10 3/16 3/28 3/30 4/4 4/6 4/12   L ankle PROM NA PF WE PF PF PF PF NV PF AFB   STM L ankle NA PF WE PF PF PF PF NV PF AFB   IASTM to L ankle PF           Re-assess         PF     5' 25'  25' 15 15' 10'  25' 10'                Neuro Re-Ed             Tandem Floor  1x30"    Foam  2x30" 3x30" 30" 10x      Floor  1x30"    Foam  2x30"   SLS Floor 1x20"    Foam 2x20" Foam 2x20"        Floor  1x20"    Foam  2x20"   Rockerboard balance A/P 2x30"         A/P  2x30"   Wt shifting AP and ML  20x ea 20x ea 10x    10x ea     Static stepping anterior and lateral  20x ea 20x ea     20x                   NSP march      20x   20x     NSP 90/90 march     20x   20x     NSP heel slides        20x     Ther Ex             Ankle 4 way GTB 20x  GTB  x20 ea OTB 10x ea GTB 20x GTB 20x GTB 20x GTB 20x GTB 20x GTB 20x   Isometric  INV/EV 4 way 5"x20 4 way with manual 20x ea 5"x20 ea  4 way 5"x20 4 way 5"x20 4 way 5"x20 4 way Purple TB 20x ea 20x 4 way 5"x20   Ankle circles 20x  20x 20x 20x 20x 20x 20x seated and standing 20x  20x   Foam rocking AP  30x 20x          Wall squat   10x2          STD hip 3 way   20x ea  20x ea        Step lunges   20x ea          LAQ   3# x20    x30       SLR   x20    x20 x20     VMO SLR x20  x20  x20  x20 x20  x20   S/L hip ABD + ADD x20   x20  x20 ea  x20 x20  + ADD  x20 ea   Bridge walkout PB 20x    10x ea  PB 10x PB 10x   PB 10x    Ther Activity             Bike/TM 5 min bike 5 min  5 min 7 min   5 min bike 5 min bike 5 min 5 min bike                Gait Training                                       Modalities             US      8 min   8 mA 15 min   8 mA

## 2022-04-21 ENCOUNTER — OFFICE VISIT (OUTPATIENT)
Dept: PHYSICAL THERAPY | Facility: CLINIC | Age: 47
End: 2022-04-21
Payer: COMMERCIAL

## 2022-04-21 DIAGNOSIS — S92.252K CLOSED DISPLACED FRACTURE OF NAVICULAR BONE OF LEFT FOOT WITH NONUNION, SUBSEQUENT ENCOUNTER: Primary | ICD-10-CM

## 2022-04-21 DIAGNOSIS — S92.255S CLOSED NONDISPLACED FRACTURE OF NAVICULAR BONE OF LEFT FOOT, SEQUELA: ICD-10-CM

## 2022-04-21 PROCEDURE — 97112 NEUROMUSCULAR REEDUCATION: CPT

## 2022-04-21 PROCEDURE — 97140 MANUAL THERAPY 1/> REGIONS: CPT

## 2022-04-21 NOTE — PROGRESS NOTES
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, subsequent encounter  S92 252K    2  Closed nondisplaced fracture of navicular bone of left foot, sequela  S92 255S                   Subjective: Pt reports she was pain free for several hours after her LV but then pain set back in  She continues to have pain pain on dorsal aspect of the foot  Objective: See treatment diary below      Assessment: Pt continues to benefit from benefit form IASTM to dorsal aspect of the foot with pain relief while walking and while performing TE's noted  She shows good flexibility and control of the intrinsic muscle of the foot with newly introduces toe yoga activities  Showed improved stability with foam balance today  Post session she had no pain and ambulated with much less antalgic gait than she had at initiation of session  Plan: Continue per plan of care  Precautions: Chronic kidney disease  EPOC: 22  HEP: Access Code: [email or web address]  URL: [email or web address]  Date: 2022  Prepared by: Heidi Daring    Exercises  · Ankle Circles on Wobble Board in Sitting - 1 x daily - 7 x weekly - 3 sets - 10 reps  · Towel Scrunches - 1 x daily - 7 x weekly - 3 sets - 10 reps  · Arch Lifting - 1 x daily - 7 x weekly - 3 sets - 10 reps  · Standing Weight Shift - 1 x daily - 7 x weekly - 3 sets - 10 reps  · Stride Stance Weight Shift - 1 x daily - 7 x weekly - 3 sets - 10 reps  · Alternating Step Forward with Support - 1 x daily - 7 x weekly - 3 sets - 10 reps  · Tandem Stance - 1 x daily - 7 x weekly - 3 sets - 10 reps  · Standing Heel Raise with Support - 1 x daily - 7 x weekly - 3 sets - 10 reps           Manuals 4/14 4/19 4/21    3/30 4/4 4/6 4/12   L ankle PROM NA PF WE    PF NV PF AFB   STM L ankle NA PF WE PF NV PF AFB   IASTM to L ankle  PF WE          Re-assess         PF     5' 25' 25'    10'  25' 10'                Neuro Re-Ed             Tandem Floor  1x30"    Foam  2x30" 3x30" Airex  30"x3 ea       Floor  1x30"    Foam  2x30"   SLS Floor 1x20"    Foam 2x20" Foam 2x20" Airex  30"x3        Floor  1x20"    Foam  2x20"   Rockerboard balance A/P 2x30"         A/P  2x30"   Wt shifting AP and ML  20x ea 20x ea     10x ea     Static stepping anterior and lateral  20x ea 20x ea     20x      SLS on Airex w  opp foot cone taps   3 cone  x10                       NSP march         20x     NSP 90/90 march        20x     NSP heel slides        20x     Ther Ex             Ankle 4 way GTB 20x      GTB 20x GTB 20x GTB 20x GTB 20x   Isometric  INV/EV 4 way 5"x20 4 way with manual 20x ea 4 way with manual 20x    4 way 5"x20 4 way Purple TB 20x ea 20x 4 way 5"x20   Ankle circles 20x      20x 20x seated and standing 20x  20x   Foam rocking AP  30x 30x          Wall squat             STD hip 3 way             Step lunges             LAQ       x30       SLR       x20 x20     VMO SLR x20      x20 x20  x20   S/L hip ABD + ADD x20       x20 x20  + ADD  x20 ea   Bridge walkout PB 20x      PB 10x PB 10x   PB 10x    Ther Activity             Bike/TM 5 min bike 5 min  6 min    5 min bike 5 min bike 5 min 5 min bike                Gait Training                                       Modalities             US       15 min   8 mA

## 2022-04-25 ENCOUNTER — OFFICE VISIT (OUTPATIENT)
Dept: PHYSICAL THERAPY | Facility: CLINIC | Age: 47
End: 2022-04-25
Payer: COMMERCIAL

## 2022-04-25 DIAGNOSIS — S92.252K CLOSED DISPLACED FRACTURE OF NAVICULAR BONE OF LEFT FOOT WITH NONUNION, SUBSEQUENT ENCOUNTER: Primary | ICD-10-CM

## 2022-04-25 DIAGNOSIS — S92.255S CLOSED NONDISPLACED FRACTURE OF NAVICULAR BONE OF LEFT FOOT, SEQUELA: ICD-10-CM

## 2022-04-25 PROCEDURE — 97112 NEUROMUSCULAR REEDUCATION: CPT

## 2022-04-25 PROCEDURE — 97140 MANUAL THERAPY 1/> REGIONS: CPT

## 2022-04-25 NOTE — PROGRESS NOTES
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, subsequent encounter  S92 252K    2  Closed nondisplaced fracture of navicular bone of left foot, sequela  S92 255S                   Subjective: Pt reports overall a little better but having some pain with heel strike now      Objective: See treatment diary below      Assessment: Pt notes pain through 2-4 metatarsals with heel strike as she has been avoiding strong toe off on L side  Relief post manuals noted  Showed improved stability on foam today and no pain with that  Introduced Biodex to show weight distribution as she was heavy on her heels  Plan: Continue per plan of care  Precautions: Chronic kidney disease  EPOC: 22  HEP: Access Code: [email or web address]  URL: [email or web address]  Date: 2022  Prepared by: Dawson Spotted    Exercises  · Ankle Circles on Wobble Board in Sitting - 1 x daily - 7 x weekly - 3 sets - 10 reps  · Towel Scrunches - 1 x daily - 7 x weekly - 3 sets - 10 reps  · Arch Lifting - 1 x daily - 7 x weekly - 3 sets - 10 reps  · Standing Weight Shift - 1 x daily - 7 x weekly - 3 sets - 10 reps  · Stride Stance Weight Shift - 1 x daily - 7 x weekly - 3 sets - 10 reps  · Alternating Step Forward with Support - 1 x daily - 7 x weekly - 3 sets - 10 reps  · Tandem Stance - 1 x daily - 7 x weekly - 3 sets - 10 reps  · Standing Heel Raise with Support - 1 x daily - 7 x weekly - 3 sets - 10 reps           Manuals 4/14 4/19 4/21 4/25   3/30 4/4 4/6 4/12   L ankle PROM NA PF WE WE   PF NV PF AFB   STM L ankle NA PF WE WE   PF NV PF AFB   IASTM to L ankle  PF WE WE         Re-assess         PF     5' 25' 25' 25'   10'  25' 10'                Neuro Re-Ed             Tandem Floor  1x30"    Foam  2x30" 3x30" Airex  30"x3 ea Airex  30"x3 ea      Floor  1x30"    Foam  2x30"   SLS Floor 1x20"    Foam 2x20" Foam 2x20" Airex  30"x3  Airex  30"x3 ea      Floor  1x20"    Foam  2x20"   Rockerboard balance A/P 2x30"         A/P  2x30"   Wt shifting AP and ML  20x ea 20x ea     10x ea     Static stepping anterior and lateral  20x ea 20x ea     20x      SLS on Airex w  opp foot cone taps   3 cone  x10 3 cone   x10 ea         Biodex    10 min         NSP march         20x     NSP 90/90 march        20x     NSP heel slides        20x     Ther Ex             Ankle 4 way GTB 20x      GTB 20x GTB 20x GTB 20x GTB 20x   Isometric  INV/EV 4 way 5"x20 4 way with manual 20x ea 4 way with manual 20x 5"x10 ea   4 way 5"x20 4 way Purple TB 20x ea 20x 4 way 5"x20   Ankle circles 20x      20x 20x seated and standing 20x  20x   Foam rocking AP  30x 30x          Wall squat             STD hip 3 way             Step lunges             LAQ       x30       SLR       x20 x20     VMO SLR x20      x20 x20  x20   S/L hip ABD + ADD x20       x20 x20  + ADD  x20 ea   Bridge walkout PB 20x      PB 10x PB 10x   PB 10x    Ther Activity             Bike/TM 5 min bike 5 min  6 min 5 min bike 3 min  TM retro   5 min bike 5 min bike 5 min 5 min bike                Gait Training                                       Modalities             US       15 min   8 mA

## 2022-04-27 ENCOUNTER — OFFICE VISIT (OUTPATIENT)
Dept: PHYSICAL THERAPY | Facility: CLINIC | Age: 47
End: 2022-04-27
Payer: COMMERCIAL

## 2022-04-27 DIAGNOSIS — S92.255S CLOSED NONDISPLACED FRACTURE OF NAVICULAR BONE OF LEFT FOOT, SEQUELA: ICD-10-CM

## 2022-04-27 DIAGNOSIS — S92.252K CLOSED DISPLACED FRACTURE OF NAVICULAR BONE OF LEFT FOOT WITH NONUNION, SUBSEQUENT ENCOUNTER: Primary | ICD-10-CM

## 2022-04-27 PROCEDURE — 97140 MANUAL THERAPY 1/> REGIONS: CPT | Performed by: PHYSICAL THERAPIST

## 2022-04-27 PROCEDURE — 97110 THERAPEUTIC EXERCISES: CPT | Performed by: PHYSICAL THERAPIST

## 2022-04-27 NOTE — PROGRESS NOTES
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, subsequent encounter  S92 252K    2  Closed nondisplaced fracture of navicular bone of left foot, sequela  S92 255S                   Subjective: Notes feeling continued progress overall and notes having continued pain with placing weight through her toes now  Objective: See treatment diary below      Assessment: Continues to have pain and discomfort with performance PFed positions  Noting TrPs t/o gastroc/soleus regions improved with TFM and deep pressure, IASTM  Trial of leg press with focus on reduced WBing and to see when onset of foot pain occurs- noting no pain t/o PF performance aside from arch pain at 125 lbs leg press  Will progress with strength in PF as able with BW reduced PF while on biodex noted at level 8  Plan: Continue per plan of care  Progress with PF strength as able     Precautions: Chronic kidney disease  EPOC: 22  HEP: Access Code: [email or web address]  URL: [email or web address]  Date: 2022  Prepared by: Stevie Leos    Exercises  · Ankle Circles on Wobble Board in Sitting - 1 x daily - 7 x weekly - 3 sets - 10 reps  · Towel Scrunches - 1 x daily - 7 x weekly - 3 sets - 10 reps  · Arch Lifting - 1 x daily - 7 x weekly - 3 sets - 10 reps  · Standing Weight Shift - 1 x daily - 7 x weekly - 3 sets - 10 reps  · Stride Stance Weight Shift - 1 x daily - 7 x weekly - 3 sets - 10 reps  · Alternating Step Forward with Support - 1 x daily - 7 x weekly - 3 sets - 10 reps  · Tandem Stance - 1 x daily - 7 x weekly - 3 sets - 10 reps  · Standing Heel Raise with Support - 1 x daily - 7 x weekly - 3 sets - 10 reps           Manuals 4/14 4/19 4/21 4/25 4/27  3/30 4/4 4/6 4/12   L ankle PROM NA PF WE WE PF  PF NV PF AFB   STM L ankle NA PF WE WE PF  PF NV PF AFB   IASTM to L ankle  PF WE WE PF        Re-assess         PF     5' 25' 25' 25' 35'  10'  25' 10'                Neuro Re-Ed             Tandem Floor  1x30"    Foam  2x30" 3x30" Airex  30"x3 ea Airex  30"x3 ea      Floor  1x30"    Foam  2x30"   SLS Floor 1x20"    Foam 2x20" Foam 2x20" Airex  30"x3  Airex  30"x3 ea      Floor  1x20"    Foam  2x20"   Rockerboard balance A/P 2x30"         A/P  2x30"   Wt shifting AP and ML  20x ea 20x ea     10x ea     Static stepping anterior and lateral  20x ea 20x ea     20x      SLS on Airex w  opp foot cone taps   3 cone  x10 3 cone   x10 ea         Biodex    10 min 5 min PF AP level 8        NSP march         20x     NSP 90/90 march        20x     NSP heel slides        20x     Ther Ex             Ankle 4 way GTB 20x      GTB 20x GTB 20x GTB 20x GTB 20x   Isometric  INV/EV 4 way 5"x20 4 way with manual 20x ea 4 way with manual 20x 5"x10 ea   4 way 5"x20 4 way Purple TB 20x ea 20x 4 way 5"x20   Ankle circles 20x      20x 20x seated and standing 20x  20x   Foam rocking AP  30x 30x          Wall squat             STD hip 3 way             Step lunges             LAQ       x30       SLR       x20 x20     VMO SLR x20      x20 x20  x20   S/L hip ABD + ADD x20       x20 x20  + ADD  x20 ea   Leg press     125 lbs 3x5        Bridge walkout PB 20x      PB 10x PB 10x   PB 10x    Ther Activity             Bike/TM 5 min bike 5 min  6 min 5 min bike 3 min  TM retro   5 min bike 5 min bike 5 min 5 min bike                Gait Training                                       Modalities             US       15 min   8 mA

## 2022-05-04 ENCOUNTER — OFFICE VISIT (OUTPATIENT)
Dept: PHYSICAL THERAPY | Facility: CLINIC | Age: 47
End: 2022-05-04
Payer: COMMERCIAL

## 2022-05-04 DIAGNOSIS — S92.252K CLOSED DISPLACED FRACTURE OF NAVICULAR BONE OF LEFT FOOT WITH NONUNION, SUBSEQUENT ENCOUNTER: Primary | ICD-10-CM

## 2022-05-04 DIAGNOSIS — S92.255S CLOSED NONDISPLACED FRACTURE OF NAVICULAR BONE OF LEFT FOOT, SEQUELA: ICD-10-CM

## 2022-05-04 PROCEDURE — 97110 THERAPEUTIC EXERCISES: CPT | Performed by: PHYSICAL THERAPIST

## 2022-05-04 PROCEDURE — 97140 MANUAL THERAPY 1/> REGIONS: CPT | Performed by: PHYSICAL THERAPIST

## 2022-05-04 NOTE — PROGRESS NOTES
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, subsequent encounter  S92 252K    2  Closed nondisplaced fracture of navicular bone of left foot, sequela  S92 255S                   Subjective: Notes feeling okay, had two days of relief after last session  Still with discomfort with performance of walking at the top of her foot and posterior sides  Objective: See treatment diary below      Assessment: Continues to have pain with PF but less with intrinsic activation  With lateral toe walking as increased superior forefoot pain with stepping to the R with L push off- less symptoms with intrinsic activation  With any pronation with active supination with calacaneal dominance had increased pain and discomfort  Plan: Continue per plan of care  Precautions: Chronic kidney disease  EPOC: 22  HEP: Access Code: [email or web address]  URL: [email or web address]  Date: 2022  Prepared by: Michelle Edge    Exercises  · Ankle Circles on Wobble Board in Sitting - 1 x daily - 7 x weekly - 3 sets - 10 reps  · Towel Scrunches - 1 x daily - 7 x weekly - 3 sets - 10 reps  · Arch Lifting - 1 x daily - 7 x weekly - 3 sets - 10 reps  · Standing Weight Shift - 1 x daily - 7 x weekly - 3 sets - 10 reps  · Stride Stance Weight Shift - 1 x daily - 7 x weekly - 3 sets - 10 reps  · Alternating Step Forward with Support - 1 x daily - 7 x weekly - 3 sets - 10 reps  · Tandem Stance - 1 x daily - 7 x weekly - 3 sets - 10 reps  · Standing Heel Raise with Support - 1 x daily - 7 x weekly - 3 sets - 10 reps           Manuals 4/14 4/19 4/21 4/25 4/27 5/4 3/30 4/4 4/6 4/12   L ankle PROM NA PF WE WE PF PF PF NV PF AFB   STM L ankle NA PF WE WE PF PF PF NV PF AFB   IASTM to L ankle  PF WE WE PF PF       Re-assess PF     5' 25' 25' 25' 35' 25' 10'  25' 10'                Neuro Re-Ed             Tandem Floor  1x30"    Foam  2x30" 3x30" Airex  30"x3 ea Airex  30"x3 ea  30"x3    Floor  1x30"    Foam  2x30"   SLS Floor 1x20"    Foam 2x20" Foam 2x20" Airex  30"x3  Airex  30"x3 ea  30x3    Floor  1x20"    Foam  2x20"   Rockerboard balance A/P 2x30"         A/P  2x30"   Wt shifting AP and ML  20x ea 20x ea   10x ea  10x ea     Static stepping anterior and lateral  20x ea 20x ea     20x      SLS on Airex w  opp foot cone taps   3 cone  x10 3 cone   x10 ea         Biodex    10 min 5 min PF AP level 8        NSP march         20x     NSP 90/90 march        20x     NSP heel slides        20x     Ther Ex             Ankle 4 way GTB 20x      GTB 20x GTB 20x GTB 20x GTB 20x   Isometric  INV/EV 4 way 5"x20 4 way with manual 20x ea 4 way with manual 20x 5"x10 ea   4 way 5"x20 4 way Purple TB 20x ea 20x 4 way 5"x20   Ankle circles 20x      20x 20x seated and standing 20x  20x   Foam rocking AP  30x 30x   30x2       Wall squat      2x10       STD hip 3 way             Step lunges      2x10       LAQ       x30       SLR       x20 x20     VMO SLR x20      x20 x20  x20   S/L hip ABD + ADD x20       x20 x20  + ADD  x20 ea   Leg press     125 lbs 3x5        Bridge walkout PB 20x      PB 10x PB 10x   PB 10x    Ther Activity             Bike/TM 5 min bike 5 min  6 min 5 min bike 3 min  TM retro  10 min TM retro 3 min 5 min bike 5 min bike 5 min 5 min bike                Gait Training                                       Modalities             US       15 min   8 mA

## 2022-05-05 ENCOUNTER — APPOINTMENT (OUTPATIENT)
Dept: PHYSICAL THERAPY | Facility: CLINIC | Age: 47
End: 2022-05-05
Payer: COMMERCIAL

## 2022-05-06 ENCOUNTER — OFFICE VISIT (OUTPATIENT)
Dept: PHYSICAL THERAPY | Facility: CLINIC | Age: 47
End: 2022-05-06
Payer: COMMERCIAL

## 2022-05-06 DIAGNOSIS — S92.252K CLOSED DISPLACED FRACTURE OF NAVICULAR BONE OF LEFT FOOT WITH NONUNION, SUBSEQUENT ENCOUNTER: Primary | ICD-10-CM

## 2022-05-06 DIAGNOSIS — S92.255S CLOSED NONDISPLACED FRACTURE OF NAVICULAR BONE OF LEFT FOOT, SEQUELA: ICD-10-CM

## 2022-05-06 PROCEDURE — 97140 MANUAL THERAPY 1/> REGIONS: CPT | Performed by: PHYSICAL THERAPIST

## 2022-05-06 PROCEDURE — 97110 THERAPEUTIC EXERCISES: CPT | Performed by: PHYSICAL THERAPIST

## 2022-05-06 PROCEDURE — 97112 NEUROMUSCULAR REEDUCATION: CPT | Performed by: PHYSICAL THERAPIST

## 2022-05-06 NOTE — PROGRESS NOTES
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, subsequent encounter  S92 252K    2  Closed nondisplaced fracture of navicular bone of left foot, sequela  S92 255S        Start Time: 0755  Stop Time: 0840  Total time in clinic (min): 45 minutes    Subjective: Pt reports doing a lot of yardwork yesterday and feeling sore today  Her foot feels the same as always  Objective: See treatment diary below      Assessment: Pt continues to experience pain on the medial side of the foot near the navicular tuberosity and heel with INV motion during BAPS board exercises and resisted isometric INV at the heel  Pt will continue to benefit from progressions of LE strengthening and balance exercises for improved stability and support of the L foot and ankle  Pt left the session today feeling better and without pain after exercises and manual therapy to the L foot, ankle, and calf  Plan: Continue per plan of care  Progress strengthening for L LE as pain tolerates  Precautions: Chronic kidney disease  EPOC: 22  HEP: Access Code: [email or web address]  URL: [email or web address]  Date: 2022  Prepared by: Sonu Hill    Exercises  · Ankle Circles on Wobble Board in Sitting - 1 x daily - 7 x weekly - 3 sets - 10 reps  · Towel Scrunches - 1 x daily - 7 x weekly - 3 sets - 10 reps  · Arch Lifting - 1 x daily - 7 x weekly - 3 sets - 10 reps  · Standing Weight Shift - 1 x daily - 7 x weekly - 3 sets - 10 reps  · Stride Stance Weight Shift - 1 x daily - 7 x weekly - 3 sets - 10 reps  · Alternating Step Forward with Support - 1 x daily - 7 x weekly - 3 sets - 10 reps  · Tandem Stance - 1 x daily - 7 x weekly - 3 sets - 10 reps  · Standing Heel Raise with Support - 1 x daily - 7 x weekly - 3 sets - 10 reps Manuals 4/14 4/19 4/21 4/25 4/27 5/4 5/6 3/30 4/4 4/6 4/12   L ankle PROM NA PF WE WE PF PF LG PF NV PF AFB   STM L ankle NA PF WE WE PF PF LG PF NV PF AFB   IASTM to L ankle  PF WE WE PF PF LG       Re-assess          PF     5' 25' 25' 25' 35' 25' 10' 10'  25' 10'                 Neuro Re-Ed              Tandem Floor  1x30"    Foam  2x30" 3x30" Airex  30"x3 ea Airex  30"x3 ea  30"x3 3x30"    Floor  1x30"    Foam  2x30"   SLS Floor 1x20"    Foam 2x20" Foam 2x20" Airex  30"x3  Airex  30"x3 ea  30x3 3x30"    Floor  1x20"    Foam  2x20"   Foam pad walk       2 laps FWD/LAT       BAPS STD       x20 A/P, circles       Rockerboard balance A/P 2x30"          A/P  2x30"   Wt shifting AP and ML  20x ea 20x ea   10x ea   10x ea     Static stepping anterior and lateral  20x ea 20x ea      20x      SLS on Airex w  opp foot cone taps   3 cone  x10 3 cone   x10 ea          Biodex    10 min 5 min PF AP level 8         NSP march          20x     NSP 90/90 march         20x     NSP heel slides         20x     Ther Ex              Ankle 4 way GTB 20x       GTB 20x GTB 20x GTB 20x GTB 20x   Isometric  INV/EV 4 way 5"x20 4 way with manual 20x ea 4 way with manual 20x 5"x10 ea    4 way 5"x20 4 way Purple TB 20x ea 20x 4 way 5"x20   Ankle circles 20x       20x 20x seated and standing 20x  20x   Foam rocking AP  30x 30x   30x2        BOSU squat       2x10       Wall squat      2x10 2x10       STD hip 3 way              Step lunges      2x10 2x10       LAQ        x30       SLR        x20 x20     VMO SLR x20       x20 x20  x20   S/L hip ABD + ADD x20        x20 x20  + ADD  x20 ea   Leg press     125 lbs 3x5         Bridge walkout PB 20x       PB 10x PB 10x   PB 10x    Slant board      x20 TR        Side stepping      2 laps GTB        Ther Activity              Bike/TM 5 min bike 5 min  6 min 5 min bike 3 min  TM retro  10 min TM retro 3 min 7 min  TM retro 3 min 5 min bike 5 min bike 5 min 5 min bike                 Gait Training Modalities              US        15 min   8 mA

## 2022-05-09 ENCOUNTER — OFFICE VISIT (OUTPATIENT)
Dept: PHYSICAL THERAPY | Facility: CLINIC | Age: 47
End: 2022-05-09
Payer: COMMERCIAL

## 2022-05-09 DIAGNOSIS — S92.255S CLOSED NONDISPLACED FRACTURE OF NAVICULAR BONE OF LEFT FOOT, SEQUELA: ICD-10-CM

## 2022-05-09 DIAGNOSIS — S92.252K CLOSED DISPLACED FRACTURE OF NAVICULAR BONE OF LEFT FOOT WITH NONUNION, SUBSEQUENT ENCOUNTER: Primary | ICD-10-CM

## 2022-05-09 PROCEDURE — 97110 THERAPEUTIC EXERCISES: CPT | Performed by: PHYSICAL THERAPIST

## 2022-05-09 PROCEDURE — 97140 MANUAL THERAPY 1/> REGIONS: CPT | Performed by: PHYSICAL THERAPIST

## 2022-05-09 NOTE — PROGRESS NOTES
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, subsequent encounter  S92 252K    2  Closed nondisplaced fracture of navicular bone of left foot, sequela  S92 255S                   Subjective: Notes feeling progress overall with performance of walking  Still having soreness worse with walking and WBing  Objective: See treatment diary below      Assessment: Zabrina was able to perform all balance TE today without major difficulty including added bosu squat and star sliders  Able to progress with lateral step stepping and carioca stepping as well without major pain or discomfort  Will progress with stability and strength as able  Plan: Continue per plan of care  Precautions: Chronic kidney disease  EPOC: 22  HEP: Access Code: [email or web address]  URL: [email or web address]  Date: 2022  Prepared by: Logan Michelle    Exercises  · Ankle Circles on Wobble Board in Sitting - 1 x daily - 7 x weekly - 3 sets - 10 reps  · Towel Scrunches - 1 x daily - 7 x weekly - 3 sets - 10 reps  · Arch Lifting - 1 x daily - 7 x weekly - 3 sets - 10 reps  · Standing Weight Shift - 1 x daily - 7 x weekly - 3 sets - 10 reps  · Stride Stance Weight Shift - 1 x daily - 7 x weekly - 3 sets - 10 reps  · Alternating Step Forward with Support - 1 x daily - 7 x weekly - 3 sets - 10 reps  · Tandem Stance - 1 x daily - 7 x weekly - 3 sets - 10 reps  · Standing Heel Raise with Support - 1 x daily - 7 x weekly - 3 sets - 10 reps           Manuals    L ankle PROM NA PF WE WE PF PF LG PF NV PF AFB   STM L ankle NA PF WE WE PF PF LG PF NV PF AFB   IASTM to L ankle  PF WE WE PF PF LG       Re-assess          PF     5' 25' 25' 25' 35' 25' 10' 15'  25' 10'                 Neuro Re-Ed              Tandem Floor  1x30"    Foam  2x30" 3x30" Airex  30"x3 ea Airex  30"x3 ea  30"x3 3x30" 3x30"   Floor  1x30"    Foam  2x30"   SLS Floor 1x20"    Foam 2x20" Foam 2x20" Airex  30"x3  Airex  30"x3 ea  30x3 3x30" 3x30"   Floor  1x20"    Foam  2x20"   Foam pad walk       2 laps FWD/LAT       BAPS STD       x20 A/P, circles Bosu SLS 3x30"      Rockerboard balance A/P 2x30"          A/P  2x30"   Wt shifting AP and ML  20x ea 20x ea   10x ea   10x ea     Static stepping anterior and lateral  20x ea 20x ea      20x      SLS on Airex w  opp foot cone taps   3 cone  x10 3 cone   x10 ea    SL sliders 20x      Biodex    10 min 5 min PF AP level 8         NSP march          20x     NSP 90/90 march         20x     NSP heel slides         20x     Ther Ex              Ankle 4 way GTB 20x        GTB 20x GTB 20x GTB 20x   Isometric  INV/EV 4 way 5"x20 4 way with manual 20x ea 4 way with manual 20x 5"x10 ea    4 way 5"x20 4 way Purple TB 20x ea 20x 4 way 5"x20   Ankle circles 20x        20x seated and standing 20x  20x   Foam rocking AP  30x 30x   30x2  30x2      BOSU squat       2x10 2x10      Wall squat      2x10 2x10 2x10      STD hip 3 way              Step lunges      2x10 2x10       LAQ        x30       SLR        x20 x20     VMO SLR x20       x20 x20  x20   S/L hip ABD + ADD x20        x20 x20  + ADD  x20 ea   Leg press     125 lbs 3x5         Bridge walkout PB 20x       PB 10x PB 10x   PB 10x    Slant board      x20 TR  x20 TR and HR      Side stepping      2 laps GTB  2 laps      Ther Activity              Bike/TM 5 min bike 5 min  6 min 5 min bike 3 min  TM retro  10 min TM retro 3 min 7 min  TM retro 3 min 10 min TM retro 3 min 5 min bike 5 min 5 min bike                 Gait Training                                          Modalities              US

## 2022-05-11 ENCOUNTER — OFFICE VISIT (OUTPATIENT)
Dept: PHYSICAL THERAPY | Facility: CLINIC | Age: 47
End: 2022-05-11
Payer: COMMERCIAL

## 2022-05-11 DIAGNOSIS — S92.252K CLOSED DISPLACED FRACTURE OF NAVICULAR BONE OF LEFT FOOT WITH NONUNION, SUBSEQUENT ENCOUNTER: Primary | ICD-10-CM

## 2022-05-11 DIAGNOSIS — S92.255S CLOSED NONDISPLACED FRACTURE OF NAVICULAR BONE OF LEFT FOOT, SEQUELA: ICD-10-CM

## 2022-05-11 PROCEDURE — 97110 THERAPEUTIC EXERCISES: CPT | Performed by: PHYSICAL THERAPIST

## 2022-05-11 PROCEDURE — 97140 MANUAL THERAPY 1/> REGIONS: CPT | Performed by: PHYSICAL THERAPIST

## 2022-05-11 NOTE — PROGRESS NOTES
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, subsequent encounter  S92 252K    2  Closed nondisplaced fracture of navicular bone of left foot, sequela  S92 255S                   Subjective: Notes feeling okay, having increased soreness everywhere today  Objective: See treatment diary below      Assessment: Continues to have pain with resisted ankle DF and EV with extensor digitorum overuse/synovitis  Noting normal mobility overall  Continued anterior tibialis and peroneal TrPs also with referall with deep pressure  Will continue to work towards overall improvement in hip strength and stability as well as progression back into CK[facility] ankle strengthening  Held most TE today due to soreness and poor sleep from previous night  Pt had increased sharp pain with eccentric EV on L peroneal region- trial of tuning fork to assess for stress fx (-) pain  Plan: Continue per plan of care  Precautions: Chronic kidney disease  EPOC: 22  HEP: Access Code: [email or web address]  URL: [email or web address]  Date: 2022  Prepared by: Rosalee Moder    Exercises  · Ankle Circles on Wobble Board in Sitting - 1 x daily - 7 x weekly - 3 sets - 10 reps  · Towel Scrunches - 1 x daily - 7 x weekly - 3 sets - 10 reps  · Arch Lifting - 1 x daily - 7 x weekly - 3 sets - 10 reps  · Standing Weight Shift - 1 x daily - 7 x weekly - 3 sets - 10 reps  · Stride Stance Weight Shift - 1 x daily - 7 x weekly - 3 sets - 10 reps  · Alternating Step Forward with Support - 1 x daily - 7 x weekly - 3 sets - 10 reps  · Tandem Stance - 1 x daily - 7 x weekly - 3 sets - 10 reps  · Standing Heel Raise with Support - 1 x daily - 7 x weekly - 3 sets - 10 reps           Manuals  L ankle PROM NA PF WE WE PF PF LG PF PF PF AFB   STM L ankle NA PF WE WE PF PF LG PF PF PF AFB   IASTM to L ankle  PF WE WE PF PF LG  PF     Re-assess          PF     5' 25' 25' 25' 35' 25' 10' 15' 35' 25' 10'                 Neuro Re-Ed              Tandem Floor  1x30"    Foam  2x30" 3x30" Airex  30"x3 ea Airex  30"x3 ea  30"x3 3x30" 3x30"   Floor  1x30"    Foam  2x30"   SLS Floor 1x20"    Foam 2x20" Foam 2x20" Airex  30"x3  Airex  30"x3 ea  30x3 3x30" 3x30"   Floor  1x20"    Foam  2x20"   Foam pad walk       2 laps FWD/LAT       BAPS STD       x20 A/P, circles Bosu SLS 3x30"      Rockerboard balance A/P 2x30"          A/P  2x30"   Wt shifting AP and ML  20x ea 20x ea   10x ea        Static stepping anterior and lateral  20x ea 20x ea            SLS on Airex w  opp foot cone taps   3 cone  x10 3 cone   x10 ea    SL sliders 20x      Biodex    10 min 5 min PF AP level 8         NSP march               NSP 90/90 march              NSP heel slides              Ther Ex              Ankle 4 way GTB 20x         GTB 20x GTB 20x   Isometric  INV/EV 4 way 5"x20 4 way with manual 20x ea 4 way with manual 20x 5"x10 ea    4 way 5"x20 5x10" 20x 4 way 5"x20   Ankle circles 20x         20x  20x   Foam rocking AP  30x 30x   30x2  30x2      BOSU squat       2x10 2x10      Wall squat      2x10 2x10 2x10      STD hip 3 way              Step lunges      2x10 2x10       LAQ        x30       SLR        x20 x20     VMO SLR x20       x20 x20  x20   S/L hip ABD + ADD x20        x20 x20  + ADD  x20 ea   Leg press     125 lbs 3x5         Bridge walkout PB 20x       PB 10x    PB 10x    Slant board      x20 TR  x20 TR and HR      Side stepping      2 laps GTB  2 laps      Ther Activity              Bike/TM 5 min bike 5 min  6 min 5 min bike 3 min  TM retro  10 min TM retro 3 min 7 min  TM retro 3 min 10 min TM retro 3 min 10 min bike 5 min 5 min bike                 Gait Training                                          Modalities [address]

## 2022-05-14 PROBLEM — Z80.3 FAMILY HISTORY OF BREAST CANCER: Status: ACTIVE | Noted: 2022-05-14

## 2022-05-14 PROBLEM — Z97.5 IUD (INTRAUTERINE DEVICE) IN PLACE: Status: ACTIVE | Noted: 2022-05-14

## 2022-05-14 PROBLEM — Z13.71 BRCA NEGATIVE: Status: ACTIVE | Noted: 2022-05-14

## 2022-05-14 PROBLEM — Z80.41 FAMILY HISTORY OF OVARIAN CANCER: Status: ACTIVE | Noted: 2022-05-14

## 2022-05-14 PROBLEM — Z98.84 HISTORY OF BARIATRIC SURGERY: Status: ACTIVE | Noted: 2022-05-14

## 2022-05-14 NOTE — PROGRESS NOTES
Assessment/Plan:    Encounter for gynecological examination (general) (routine) without abnormal findings  Has been amenorrheic with IUD placed 12/2017, but noted episodes of bleeding after each COVID vaccination and recent blood stained mucous  Had foot fracture after fall and then another during PT  Normal breast and pelvic exams  Last pap 1/2021, neg/neg; due by 2026  Mammo order given  Colonoscopy recs reviewed, will check with bariatric surgeon who did recent EGD  Dexa offered due to h/o fracture and bariatric surgery with malabsorption  Zabrina agrees to check with insurance for benefits  Diagnoses and all orders for this visit:    Encounter for gynecological examination (general) (routine) without abnormal findings    Encounter for screening mammogram for breast cancer  -     Mammo screening bilateral w 3d & cad; Future    Osteoporosis screening  -     DXA bone density spine hip and pelvis; Future    Unspecified fracture of left foot, subsequent encounter for fracture with routine healing  -     DXA bone density spine hip and pelvis; Future    Other specified intestinal malabsorption  -     DXA bone density spine hip and pelvis; Future          Subjective:      Patient ID: Shantell Hernandez is a 56 y o  female  Here for well check  The following portions of the patient's history were reviewed and updated as appropriate:   She  has a past medical history of Anemia, Asthma, BRCA negative (01/2018), Chronic kidney disease, Fracture, GERD (gastroesophageal reflux disease) (anxiety), History of transfusion (12/2/2017), PONV (postoperative nausea and vomiting), Pseudocholinesterase deficiency, and Renal disorder    She   Patient Active Problem List    Diagnosis Date Noted    Encounter for gynecological examination (general) (routine) without abnormal findings 05/16/2022    Family history of ovarian  - Maternal aunt 05/14/2022    BRCA negative - 2018 Negative 25 gene panel with hematology 05/14/2022  Family history of breast cancer - Paternal aunt 05/14/2022    IUD (intrauterine device) in place - Mirena 12/2017 05/14/2022    History of bariatric surgery 05/14/2022    Pseudocholinesterase deficiency     PONV (postoperative nausea and vomiting)     GERD (gastroesophageal reflux disease)     Asthma     Closed nondisplaced fracture of navicular bone of left foot 07/22/2021    Chest pain 05/14/2018     She  has a past surgical history that includes Bariatric Surgery (2014); Cholecystectomy (2001); Oophorectomy (Right, 08/2017); Elbow surgery (2019); pr reconst post tib tend,excis acc tar diane (Left, 12/27/2021); Appendectomy (01/13/2014); Dilation and evacuation (2012); and Mammo (historical) (02/05/2021)  Her family history includes Breast cancer (age of onset: 59) in her paternal aunt; Cancer in her father and paternal grandfather; Diabetes in her maternal grandfather; Ovarian cancer in her maternal aunt; Uterine cancer in her maternal grandmother  She  reports that she has never smoked  She has never used smokeless tobacco  She reports that she does not drink alcohol and does not use drugs    Current Outpatient Medications   Medication Sig Dispense Refill    albuterol (2 5 mg/3 mL) 0 083 % nebulizer solution Take 2 5 mg by nebulization every 6 (six) hours as needed for wheezing      Ascorbic Acid (vitamin C) 1000 MG tablet Take 1,000 mg by mouth daily      Calcium Carbonate-Vit D-Min (CALCIUM 600+D3 PLUS MINERALS PO) Take by mouth      cyanocobalamin (VITAMIN B-12) 1000 MCG tablet Take 1,000 mcg by mouth every 30 (thirty) days      ergocalciferol (VITAMIN D2) 50,000 units Take 50,000 Units by mouth      famotidine (PEPCID) 40 MG tablet Take 40 mg by mouth daily at bedtime      levonorgestrel (MIRENA) 20 MCG/24HR IUD 1 each by Intrauterine route once      omeprazole (PriLOSEC) 40 MG capsule Take 40 mg by mouth daily      vitamin E, tocopherol, 400 units capsule Take 400 Units by mouth daily  vitamin k 100 MCG tablet Take 100 mcg by mouth daily      VITAMINS A D C PO Take 50,000 Units by mouth in the morning      zinc gluconate 50 mg tablet Take 100 mg by mouth daily       No current facility-administered medications for this visit  She is allergic to morphine, adhesive [medical tape], nsaids, sulfacetamide, and lovenox [enoxaparin]       Review of Systems  No breast, bladder, bowel changes   No new persistent pain, bloating, early satiety or pelvic pressure      Objective:      /70   Ht 5' 8" (1 727 m)   Wt 83 5 kg (184 lb)   LMP 05/14/2022 (Exact Date)   Breastfeeding No   BMI 27 98 kg/m²          Physical Exam  General appearance: no distress, pleasant  Neck: thyroid without nodules or thyromegaly, no palpable adenopathy  Lymph nodes: no palpable adenopathy  Breasts: no masses, nodes or skin changes  Abdomen: soft, non tender, no palpable masses  Pelvic exam: normal external genitalia, urethral meatus normal, vagina without lesions, cervix with IUD string in os, without lesions, uterus small, non tender, no adnexal masses, non tender  Rectal exam: no masses, non tender, RV confirms above

## 2022-05-16 ENCOUNTER — OFFICE VISIT (OUTPATIENT)
Dept: PHYSICAL THERAPY | Facility: CLINIC | Age: 47
End: 2022-05-16
Payer: COMMERCIAL

## 2022-05-16 ENCOUNTER — ANNUAL EXAM (OUTPATIENT)
Dept: OBGYN CLINIC | Facility: CLINIC | Age: 47
End: 2022-05-16
Payer: COMMERCIAL

## 2022-05-16 VITALS
BODY MASS INDEX: 27.89 KG/M2 | SYSTOLIC BLOOD PRESSURE: 110 MMHG | HEIGHT: 68 IN | DIASTOLIC BLOOD PRESSURE: 70 MMHG | WEIGHT: 184 LBS

## 2022-05-16 DIAGNOSIS — S92.255S CLOSED NONDISPLACED FRACTURE OF NAVICULAR BONE OF LEFT FOOT, SEQUELA: ICD-10-CM

## 2022-05-16 DIAGNOSIS — S92.252K CLOSED DISPLACED FRACTURE OF NAVICULAR BONE OF LEFT FOOT WITH NONUNION, SUBSEQUENT ENCOUNTER: Primary | ICD-10-CM

## 2022-05-16 DIAGNOSIS — K90.89 OTHER SPECIFIED INTESTINAL MALABSORPTION: ICD-10-CM

## 2022-05-16 DIAGNOSIS — Z12.31 ENCOUNTER FOR SCREENING MAMMOGRAM FOR BREAST CANCER: ICD-10-CM

## 2022-05-16 DIAGNOSIS — S92.902D UNSPECIFIED FRACTURE OF LEFT FOOT, SUBSEQUENT ENCOUNTER FOR FRACTURE WITH ROUTINE HEALING: ICD-10-CM

## 2022-05-16 DIAGNOSIS — Z01.419 ENCOUNTER FOR GYNECOLOGICAL EXAMINATION (GENERAL) (ROUTINE) WITHOUT ABNORMAL FINDINGS: Primary | ICD-10-CM

## 2022-05-16 DIAGNOSIS — Z13.820 OSTEOPOROSIS SCREENING: ICD-10-CM

## 2022-05-16 PROCEDURE — 97110 THERAPEUTIC EXERCISES: CPT

## 2022-05-16 PROCEDURE — 97140 MANUAL THERAPY 1/> REGIONS: CPT

## 2022-05-16 PROCEDURE — 99396 PREV VISIT EST AGE 40-64: CPT | Performed by: OBSTETRICS & GYNECOLOGY

## 2022-05-16 NOTE — PATIENT INSTRUCTIONS
Return to office in one year unless having any problems such as breast changes, bleeding, new persistent pain, new progressive bloating, new problems eating (getting full to quickly) or new constant urinary pressure that does not resolve in one week  Call for your IUD replacement if the monthly bleeding becomes heavy or regular  Talk to your surgeon about screening colonoscopy, now recommended @45

## 2022-05-16 NOTE — LETTER
May 16, 2022     VIVIAN Powell  [address]  [place] [address]    Patient: Jw Miguel   YOB: 1975   Date of Visit: 5/16/2022       Dear Dr Gabriella Lincoln:    Thank you for referring Jw Miguel to me for evaluation  Below are my notes for this consultation  If you have questions, please do not hesitate to call me  I look forward to following your patient along with you  Sincerely,        Jacy Oneill MD        CC: No Recipients  Jacy Oneill MD  5/16/2022 11:46 AM  Sign when Signing Visit  Assessment/Plan:    Encounter for gynecological examination (general) (routine) without abnormal findings  Has been amenorrheic with IUD placed 12/2017, but noted episodes of bleeding after each COVID vaccination and recent blood stained mucous  Had foot fracture after fall and then another during PT  Normal breast and pelvic exams  Last pap 1/2021, neg/neg; due by 2026  Mammo order given  Colonoscopy recs reviewed, will check with bariatric surgeon who did recent EGD  Dexa offered due to h/o fracture and bariatric surgery with malabsorption  Zabrina agrees to check with insurance for benefits  Diagnoses and all orders for this visit:    Encounter for gynecological examination (general) (routine) without abnormal findings    Encounter for screening mammogram for breast cancer  -     Mammo screening bilateral w 3d & cad; Future    Osteoporosis screening  -     DXA bone density spine hip and pelvis; Future    Unspecified fracture of left foot, subsequent encounter for fracture with routine healing  -     DXA bone density spine hip and pelvis; Future    Other specified intestinal malabsorption  -     DXA bone density spine hip and pelvis; Future          Subjective:      Patient ID: Jw Miguel is a 56 y o  female  Here for well check        The following portions of the patient's history were reviewed and updated as appropriate:   She  has a past medical history of Anemia, Asthma, BRCA negative (01/2018), Chronic kidney disease, Fracture, GERD (gastroesophageal reflux disease) (anxiety), History of transfusion (12/2/2017), PONV (postoperative nausea and vomiting), Pseudocholinesterase deficiency, and Renal disorder  She   Patient Active Problem List    Diagnosis Date Noted    Encounter for gynecological examination (general) (routine) without abnormal findings 05/16/2022    Family history of ovarian  - Maternal aunt 05/14/2022    BRCA negative - 2018 Negative 25 gene panel with hematology 05/14/2022    Family history of breast cancer - Paternal aunt 05/14/2022    IUD (intrauterine device) in place - Mirena 12/2017 05/14/2022    History of bariatric surgery 05/14/2022    Pseudocholinesterase deficiency     PONV (postoperative nausea and vomiting)     GERD (gastroesophageal reflux disease)     Asthma     Closed nondisplaced fracture of navicular bone of left foot 07/22/2021    Chest pain 05/14/2018     She  has a past surgical history that includes Bariatric Surgery (2014); Cholecystectomy (2001); Oophorectomy (Right, 08/2017); Elbow surgery (2019); pr reconst post tib tend,excis acc tar diane (Left, 12/27/2021); Appendectomy (01/13/2014); Dilation and evacuation (2012); and Mammo (historical) (02/05/2021)  Her family history includes Breast cancer (age of onset: 59) in her paternal aunt; Cancer in her father and paternal grandfather; Diabetes in her maternal grandfather; Ovarian cancer in her maternal aunt; Uterine cancer in her maternal grandmother  She  reports that she has never smoked  She has never used smokeless tobacco  She reports that she does not drink alcohol and does not use drugs    Current Outpatient Medications   Medication Sig Dispense Refill    albuterol (2 5 mg/3 mL) 0 083 % nebulizer solution Take 2 5 mg by nebulization every 6 (six) hours as needed for wheezing      Ascorbic Acid (vitamin C) 1000 MG tablet Take 1,000 mg by mouth daily      Calcium Carbonate-Vit D-Min (CALCIUM 600+D3 PLUS MINERALS PO) Take by mouth      cyanocobalamin (VITAMIN B-12) 1000 MCG tablet Take 1,000 mcg by mouth every 30 (thirty) days      ergocalciferol (VITAMIN D2) 50,000 units Take 50,000 Units by mouth      famotidine (PEPCID) 40 MG tablet Take 40 mg by mouth daily at bedtime      levonorgestrel (MIRENA) 20 MCG/24HR IUD 1 each by Intrauterine route once      omeprazole (PriLOSEC) 40 MG capsule Take 40 mg by mouth daily      vitamin E, tocopherol, 400 units capsule Take 400 Units by mouth daily      vitamin k 100 MCG tablet Take 100 mcg by mouth daily      VITAMINS A D C PO Take 50,000 Units by mouth in the morning      zinc gluconate 50 mg tablet Take 100 mg by mouth daily       No current facility-administered medications for this visit  She is allergic to morphine, adhesive [medical tape], nsaids, sulfacetamide, and lovenox [enoxaparin]       Review of Systems  No breast, bladder, bowel changes   No new persistent pain, bloating, early satiety or pelvic pressure      Objective:      /70   Ht 5' 8" (1 727 m)   Wt 83 5 kg (184 lb)   LMP 05/14/2022 (Exact Date)   Breastfeeding No   BMI 27 98 kg/m²          Physical Exam  General appearance: no distress, pleasant  Neck: thyroid without nodules or thyromegaly, no palpable adenopathy  Lymph nodes: no palpable adenopathy  Breasts: no masses, nodes or skin changes  Abdomen: soft, non tender, no palpable masses  Pelvic exam: normal external genitalia, urethral meatus normal, vagina without lesions, cervix with IUD string in os, without lesions, uterus small, non tender, no adnexal masses, non tender  Rectal exam: no masses, non tender, RV confirms above

## 2022-05-16 NOTE — PROGRESS NOTES
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, subsequent encounter  S92 252K    2  Closed nondisplaced fracture of navicular bone of left foot, sequela  S92 255S                   Subjective: Pt feeling really good except for uneven surfaces  Objective: See treatment diary below      Assessment: Pt continues to improve with overall strength and balance  Much less pain while on foam today to simulate uneven surfaces pt has been challenged by  She continues to benefit from manual therapy- noting most improvement and benefit from a LE DTM performed by PF LV  Found relief for days after  Will continue to work on strengthening and mobility on uneven surfaces  Added Ankle strengthening with TB in diagonal patterns today-slight discomfort  Plan: Continue per plan of care  Precautions: Chronic kidney disease  EPOC: 22  HEP: Access Code: [email or web address]  URL: [email or web address]  Date: 2022  Prepared by: Rosalee Moder    Exercises  · Ankle Circles on Wobble Board in Sitting - 1 x daily - 7 x weekly - 3 sets - 10 reps  · Towel Scrunches - 1 x daily - 7 x weekly - 3 sets - 10 reps  · Arch Lifting - 1 x daily - 7 x weekly - 3 sets - 10 reps  · Standing Weight Shift - 1 x daily - 7 x weekly - 3 sets - 10 reps  · Stride Stance Weight Shift - 1 x daily - 7 x weekly - 3 sets - 10 reps  · Alternating Step Forward with Support - 1 x daily - 7 x weekly - 3 sets - 10 reps  · Tandem Stance - 1 x daily - 7 x weekly - 3 sets - 10 reps  · Standing Heel Raise with Support - 1 x daily - 7 x weekly - 3 sets - 10 reps           Manuals  5    L ankle PROM NA PF WE WE PF PF LG PF PF WE    STM L ankle NA PF WE WE PF PF LG PF PF WE    IASTM to L ankle  PF WE WE PF PF LG  PF WE    Re-assess               5' 25' 25' 25' 35' 25' 10' 15' 35' 35'                  Neuro Re-Ed              Tandem Floor  1x30"    Foam  2x30" 3x30" Airex  30"x3 ea Airex  30"x3 ea  30"x3 3x30" 3x30"  airex  30"x3    SLS Floor 1x20"    Foam 2x20" Foam 2x20" Airex  30"x3  Airex  30"x3 ea  30x3 3x30" 3x30"  airex  30"x3    Foam pad walk       2 laps FWD/LAT       BAPS STD       x20 A/P, circles Bosu SLS 3x30"  nv    Rockerboard balance A/P 2x30"             Wt shifting AP and ML  20x ea 20x ea   10x ea        Static stepping anterior and lateral  20x ea 20x ea            SLS on Airex w  opp foot cone taps   3 cone  x10 3 cone   x10 ea    SL sliders 20x  SL sliders 20x    Biodex    10 min 5 min PF AP level 8         NSP march               NSP 90/90 march              NSP heel slides              Ther Ex              Ankle 4 way GTB 20x             Isometric  INV/EV 4 way 5"x20 4 way with manual 20x ea 4 way with manual 20x 5"x10 ea    4 way 5"x20 5x10" 5"x10 ea    TB ankle diagonals           BTB  x20 ea    Ankle circles 20x             Foam rocking AP  30x 30x   30x2  30x2      BOSU squat       2x10 2x10      Wall squat      2x10 2x10 2x10      STD hip 3 way              Step lunges      2x10 2x10       LAQ        x30       SLR        x20 x20 x20    VMO SLR x20       x20 x20 x20    S/L hip ABD + ADD x20        x20 x20 x20    Leg press     125 lbs 3x5         Bridge walkout PB 20x       PB 10x       Slant board      x20 TR  x20 TR and HR      Side stepping      2 laps GTB  2 laps      Ther Activity              Bike/TM 5 min bike 5 min  6 min 5 min bike 3 min  TM retro  10 min TM retro 3 min 7 min  TM retro 3 min 10 min TM retro 3 min 10 min bike                   Gait Training                                          Modalities              US

## 2022-05-16 NOTE — ASSESSMENT & PLAN NOTE
Has been amenorrheic with IUD placed 12/2017, but noted episodes of bleeding after each COVID vaccination and recent blood stained mucous  Had foot fracture after fall and then another during PT  Normal breast and pelvic exams  Last pap 1/2021, neg/neg; due by 2026  Mammo order given  Colonoscopy recs reviewed, will check with bariatric surgeon who did recent EGD  Dexa offered due to h/o fracture and bariatric surgery with malabsorption  Zabrina agrees to check with insurance for benefits

## 2022-05-18 ENCOUNTER — EVALUATION (OUTPATIENT)
Dept: PHYSICAL THERAPY | Facility: CLINIC | Age: 47
End: 2022-05-18
Payer: COMMERCIAL

## 2022-05-18 DIAGNOSIS — S92.255S CLOSED NONDISPLACED FRACTURE OF NAVICULAR BONE OF LEFT FOOT, SEQUELA: ICD-10-CM

## 2022-05-18 DIAGNOSIS — S92.252K CLOSED DISPLACED FRACTURE OF NAVICULAR BONE OF LEFT FOOT WITH NONUNION, SUBSEQUENT ENCOUNTER: Primary | ICD-10-CM

## 2022-05-18 PROCEDURE — 97140 MANUAL THERAPY 1/> REGIONS: CPT | Performed by: PHYSICAL THERAPIST

## 2022-05-18 PROCEDURE — 97110 THERAPEUTIC EXERCISES: CPT | Performed by: PHYSICAL THERAPIST

## 2022-05-18 NOTE — PROGRESS NOTES
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 Re-assessment 22:  Zabrina has attended 9 visits since the initiation of PT and reports a 50% GROC  Clinically demonstrates improved L ankle ROM, strength and reduced effusion despite stress fx set back ~ 1 month ago  Zabrina demonstrates improved L ankle ROM as well as improved L ankle strength in all planes but still highly limited with L ankle PF strength and push off with ambulation leading to antalgic and labored gait  She has also been hindered from continued 4 th metatarsal stress and discomfort leading to continued pain and immobilization  Due to these above limitations she will continue to benefit from skilled OPPT to address her remaining impairments, achieve established goals and return to PLOF pain-free       Re-assessment 22: Zabrina has attended 21 visits since the initiation of PT and reports a 70% GROC  Clinically demonstrates normal L ankle ROM in all planes and continued improvement in L ankle strength now with limited pain with ambulation and improved balance in all planes  She continues to have hypersensitivity at dorsal foot and metatarsalgia discomfort leading to pain with ADLs and exercise  This suggests the need for continued skilled OPPT to address her remaining impairments, achieve established goals and return to PLOF pain-free     Impairments: abnormal coordination, abnormal muscle firing, abnormal or restricted ROM, abnormal movement, activity intolerance, difficulty understanding, lacks appropriate home exercise program, pain with function, scapular dyskinesis, weight-bearing intolerance and poor posture     Symptom irritability: moderateUnderstanding of Dx/Px/POC: good   Prognosis: good    Goals  Short Term Goals (4 weeks)  1 ) Establish independence with HEP- MET  2 ) Decrease subjective pain levels from NPRS at least to 2-5/10 at rest and with activity- partially met   3 ) Improve L ankle ROM at least 5-10 degrees into all planes to allow for improved ease of movement with less guarding- MET    Long Term Goals (8 weeks)  1 ) Improve L ankle ROM to WNL in all planes to restore normal movement with ADLs and function- partially MET  2 ) Improve L ankle strength to 5/5 in all planes in order to return to pain-free ADLs and function- Partially MET  3 ) Improve FOTO score at least to 75 points showing improved self reported disability - not met  4 ) Reduce L ankle effusion at least  5 to 1 cm - MET  5 ) Improve lateral step down to <2/6 showing good CKC stability and functional strength- new    Plan  Plan details: Continue POC for L ankle ROM, proprioception and progressive WBing; monitor sxs and progress as able  Patient would benefit from: skilled physical therapy and PT eval  Planned modality interventions: cryotherapy, biofeedback, electrical stimulation/Russian stimulation and TENS  Planned therapy interventions: abdominal trunk stabilization, activity modification, ADL retraining, ADL training, balance, balance/weight bearing training, behavior modification, IADL retraining, joint mobilization, manual therapy, motor coordination training, muscle pump exercises, neuromuscular re-education, orthotic management and training, patient education, postural training, self care, sensory integrative techniques, strengthening, stretching, therapeutic activities, therapeutic exercise, therapeutic training, graded exercise, graded activity, gait training, functional ROM exercises, flexibility, coordination, body mechanics training, graded motor and home exercise program  Frequency: 2x week  Duration in visits: 16  Duration in weeks: 8  Plan of Care beginning date: 5/18/2022  Plan of Care expiration date: 7/20/2022  Treatment plan discussed with: patient        Subjective Evaluation    History of Present Illness  Date of onset: 5/1/2021  Date of surgery: 12/27/2021  Mechanism of injury: Emre Duncan is a 40 y o  female who presents with increased L navicular fracture starting ~ 9 months ago with after falling down stairs with an inversion sprain  Was able to walk initially  Notes going to [facility] and was (-) for malleolar and was then referred to Ortho and sent to [facility] PT -2 months without improvement  Tried manage conservatively for a few weeks in a boot without improvement  Then was referred for surgical consult in November and decided for 12/27/22 for surgery  Had a lateral ankle reconstruction as a result of + anterior drawer  Was sent home the same day  Post surgical splint for 3 weeks then hard cast for 4 weeks NWBing which was removed on 2/11/22  Now 50% WBing then progression to WBAT  Had F/U with MD on 2/11/22  Denies any night pain or changes in bowel or bladder function    Denies any NT signs or sxs aside from incisional pain  F/U with MD in 6 weeks  Wishes to return to PLOF pain-free walking at the gym and perform basic ADLs  Re-assessment 22:  Zabrina has attended 9 visits since the initiation of PT and reports a 50% GROC  Reports improved L ankle mobility but still with hard stop at lateral region of ankle with active INV  Notes feeling improved stability in her ankle overall  Reports having difficulty with stairs due to mobility deficits and has resorted to going down side ways and limited ability to go up and down hills/inclines  Still having pain and discomfort with walking due to 4th met stress fx- still waiting for clearance from MD to progress from surgical shoe on 22  PSFS:  Push band equipment- /10; Wear high heels 0/10; Walk a mile:   score for PSFS  Re-assessment 22:  Zabrina has attended 21 visits since the initiation of PT and reports a 70% GROC  Reports continued difficulty with going up large hills able to perform   25 of a mile  Able to walk on flat surfaces up to 15 minutes  Still getting anterior dorsal pain as well as posterior medial as well as anterolateral ankle pain with hill  Still with posterior tib pain  Continues report difficulty with mobility going up stairs, notes feeling uncomfortable with going down stairs as well as anterior/medial knee pain with going down stairs  Reports no F/U with MD at this point  Wishes to continue with OPPT  PSFS: Push band equipment 2/10;  Wear high feels 0/10; walking a mile 2 5/10; Stairs 5/10:  14  score           Not a recurrent problem   Quality of life: good    Pain  Current pain ratin  At best pain ratin  At worst pain ratin  Location: L ankle  Quality: sharp, pulling and dull ache  Relieving factors: rest, support, heat and medications  Aggravating factors: walking, standing and sitting  Progression: worsening    Social Support  Steps to enter house: yes  2  Stairs in house: yes   15  Lives in: multiple-level home  Lives with: spouse and young children    Employment status: not working  Exercise history: Gym occasionally   Life stress: No       Diagnostic Tests  X-ray: abnormal  Treatments  Previous treatment: physical therapy  Current treatment: immobilization and physical therapy  Patient Goals  Patient goals for therapy: decreased edema, decreased pain, improved balance, increased motion, increased strength, independence with ADLs/IADLs and return to sport/leisure activities  Patient goal: Get back to regular life        Objective     Active Range of Motion   Left Ankle/Foot   Dorsiflexion (kf): 25 degrees   Plantar flexion: 59 degrees   Inversion: 38 degrees   Eversion: 15 degrees     Right Ankle/Foot   Dorsiflexion (kf): 17 degrees   Plantar flexion: 55 degrees   Inversion: 35 degrees   Eversion: 21 degrees     Strength/Myotome Testing     Left Ankle/Foot   Dorsiflexion: 5  Plantar flexion: 5  Inversion: 4+  Eversion: 5    Right Ankle/Foot   Dorsiflexion: 5  Plantar flexion: 5  Inversion: 5  Eversion: 5    Tests     Right Ankle/Foot   Negative for anterior drawer  Swelling   Left Ankle/Foot   Figure 8: 49 cm  Malleoli: 24 cm    Right Ankle/Foot   Figure 8: 49 5 cm  Malleoli: 22 cm    Functional Assessment      Squat    Left valgus and trunk lean right  Single Leg Stance   Left: 30 seconds  Right: 30 seconds    General Comments: Ankle/Foot Comments   Mid calf girth ; 35 cm L vs 38 cm R              Precautions: Chronic kidney disease  EPO: 4/21/22  HEP: Access Code: [email or web address]  URL: [email or web address]  Date: 02/17/2022  Prepared by: Jammie Dave    Exercises  · Ankle Circles on Wobble Board in Sitting - 1 x daily - 7 x weekly - 3 sets - 10 reps  · Towel Scrunches - 1 x daily - 7 x weekly - 3 sets - 10 reps  · Arch Lifting - 1 x daily - 7 x weekly - 3 sets - 10 reps  · Standing Weight Shift - 1 x daily - 7 x weekly - 3 sets - 10 reps  · Stride Stance Weight Shift - 1 x daily - 7 x weekly - 3 sets - 10 reps  · Alternating Step Forward with Support - 1 x daily - 7 x weekly - 3 sets - 10 reps  · Tandem Stance - 1 x daily - 7 x weekly - 3 sets - 10 reps  · Standing Heel Raise with Support - 1 x daily - 7 x weekly - 3 sets - 10 reps           Manuals 4/14 4/19 4/21 4/25 4/27 5/4 5/6 5/9 5/11 5/16 5/18   L ankle PROM NA PF WE WE PF PF LG PF PF WE PF   STM L ankle NA PF WE WE PF PF LG PF PF WE PF   IASTM to L ankle  PF WE WE PF PF LG  PF WE    Re-assess           PF    5' 25' 25' 25' 35' 25' 10' 15' 35' 35' 35'                 Neuro Re-Ed              Tandem Floor  1x30"    Foam  2x30" 3x30" Airex  30"x3 ea Airex  30"x3 ea  30"x3 3x30" 3x30"  airex  30"x3    SLS Floor 1x20"    Foam 2x20" Foam 2x20" Airex  30"x3  Airex  30"x3 ea  30x3 3x30" 3x30"  airex  30"x3 2x30"   Foam pad walk       2 laps FWD/LAT       BAPS STD       x20 A/P, circles Bosu SLS 3x30"  nv    Rockerboard balance A/P 2x30"             Wt shifting AP and ML  20x ea 20x ea   10x ea        Static stepping anterior and lateral  20x ea 20x ea            SLS on Airex w  opp foot cone taps   3 cone  x10 3 cone   x10 ea    SL sliders 20x  SL sliders 20x    Biodex    10 min 5 min PF AP level 8         NSP march               NSP 90/90 march              NSP heel slides              Ther Ex              Ankle 4 way GTB 20x             Isometric  INV/EV 4 way 5"x20 4 way with manual 20x ea 4 way with manual 20x 5"x10 ea    4 way 5"x20 5x10" 5"x10 ea    TB ankle diagonals           BTB  x20 ea    Ankle circles 20x             Foam rocking AP  30x 30x   30x2  30x2      BOSU squat       2x10 2x10      Wall squat      2x10 2x10 2x10   2x10 5" hold   STD hip 3 way              Step lunges      2x10 2x10       LAQ        x30       SLR        x20 x20 x20    VMO SLR x20       x20 x20 x20    S/L hip ABD + ADD x20        x20 x20 x20    Leg press     125 lbs 3x5         Bridge walkout PB 20x       PB 10x       Slant board      x20 TR  x20 TR and HR   x20 TH/HR and mini squat   Side stepping      2 laps GTB  2 laps      Ther Activity              Bike/TM 5 min bike 5 min  6 min 5 min bike 3 min  TM retro  10 min TM retro 3 min 7 min  TM retro 3 min 10 min TM retro 3 min 10 min bike  7 min                  Gait Training                                          Modalities              US

## 2022-05-23 ENCOUNTER — OFFICE VISIT (OUTPATIENT)
Dept: PHYSICAL THERAPY | Facility: CLINIC | Age: 47
End: 2022-05-23
Payer: COMMERCIAL

## 2022-05-23 DIAGNOSIS — S92.255S CLOSED NONDISPLACED FRACTURE OF NAVICULAR BONE OF LEFT FOOT, SEQUELA: ICD-10-CM

## 2022-05-23 DIAGNOSIS — S92.252K CLOSED DISPLACED FRACTURE OF NAVICULAR BONE OF LEFT FOOT WITH NONUNION, SUBSEQUENT ENCOUNTER: Primary | ICD-10-CM

## 2022-05-23 PROCEDURE — 97140 MANUAL THERAPY 1/> REGIONS: CPT

## 2022-05-23 PROCEDURE — 97110 THERAPEUTIC EXERCISES: CPT

## 2022-05-23 NOTE — PROGRESS NOTES
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, subsequent encounter  S92 252K    2  Closed nondisplaced fracture of navicular bone of left foot, sequela  S92 255S                   Subjective: No new changes since LV  Objective: See treatment diary below      Assessment: Tolerated treatment well  Patient continues to demonstrate good balance on firm and pliable surfaces, primarily utilizing ankle strategy  Good tolerance to strengthening progressions  She continues to respond favorably to manuals to address soft tissue restrictions  Continued PT would be beneficial to improve function           Plan: Continue per plan of care  Precautions: Chronic kidney disease  EPOC: 22  HEP: Access Code: [email or web address]  URL: [email or web address]  Date: 2022  Prepared by: Rosalee Beard    Exercises  · Ankle Circles on Wobble Board in Sitting - 1 x daily - 7 x weekly - 3 sets - 10 reps  · Towel Scrunches - 1 x daily - 7 x weekly - 3 sets - 10 reps  · Arch Lifting - 1 x daily - 7 x weekly - 3 sets - 10 reps  · Standing Weight Shift - 1 x daily - 7 x weekly - 3 sets - 10 reps  · Stride Stance Weight Shift - 1 x daily - 7 x weekly - 3 sets - 10 reps  · Alternating Step Forward with Support - 1 x daily - 7 x weekly - 3 sets - 10 reps  · Tandem Stance - 1 x daily - 7 x weekly - 3 sets - 10 reps  · Standing Heel Raise with Support - 1 x daily - 7 x weekly - 3 sets - 10 reps           Manuals  5/4 5/6 5/9 5/11 516     L ankle PROM PF PF LG PF PF WE PF MB    STM L ankle PF PF LG PF PF WE PF MB    IASTM to L ankle PF PF LG  PF WE  MB    Re-assess       PF      35' 25' 10' 15' 35' 35' 35'                 Neuro Re-Ed            Tandem  30"x3 3x30" 3x30"  airex  30"x3  airex  30"x3    SLS  30x3 3x30" 3x30"  airex  30"x3 2x30" 2x30"    Foam pad walk   2 laps FWD/LAT         BAPS STD   x20 A/P, circles Bosu SLS 3x30"  nv      Rockerboard balance            Wt shifting AP and ML  10x ea          Static stepping anterior and lateral             SLS on Airex w  opp foot cone taps    SL sliders 20x  SL sliders 20x  SL sliders 20x    Biodex 5 min PF AP level 8           NSP march             NSP 90/90 march            NSP heel slides            Ther Ex            Ankle 4 way            Isometric  INV/EV    4 way 5"x20 5x10" 5"x10 ea  5"x10 ea    TB ankle diagonals       BTB  x20 ea      Ankle circles            Foam rocking AP  30x2  30x2        BOSU squat   2x10 2x10        Wall squat  2x10 2x10 2x10   2x10 5" hold 2x10 5" hold    STD hip 3 way            Step lunges  2x10 2x10         LAQ    x30         SLR    x20 x20 x20  20x 1#    VMO SLR    x20 x20 x20  20x 1#    S/L hip ABD    x20 x20 x20  20x    Leg press 125 lbs 3x5           Bridge walkout    PB 10x         Slant board  x20 TR  x20 TR and HR   x20 TH/HR and mini squat x20 TH/HR and mini squat    Side stepping  2 laps GTB  2 laps        Ther Activity            Bike/TM  10 min TM retro 3 min 7 min  TM retro 3 min 10 min TM retro 3 min 10 min bike  7 min  8 min                 Gait Training                                    Modalities            US

## 2022-05-25 ENCOUNTER — OFFICE VISIT (OUTPATIENT)
Dept: PHYSICAL THERAPY | Facility: CLINIC | Age: 47
End: 2022-05-25
Payer: COMMERCIAL

## 2022-05-25 DIAGNOSIS — S92.252K CLOSED DISPLACED FRACTURE OF NAVICULAR BONE OF LEFT FOOT WITH NONUNION, SUBSEQUENT ENCOUNTER: Primary | ICD-10-CM

## 2022-05-25 DIAGNOSIS — S92.255S CLOSED NONDISPLACED FRACTURE OF NAVICULAR BONE OF LEFT FOOT, SEQUELA: ICD-10-CM

## 2022-05-25 PROCEDURE — 97140 MANUAL THERAPY 1/> REGIONS: CPT | Performed by: PHYSICAL THERAPIST

## 2022-05-25 PROCEDURE — 97110 THERAPEUTIC EXERCISES: CPT | Performed by: PHYSICAL THERAPIST

## 2022-05-25 NOTE — PROGRESS NOTES
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, subsequent encounter  S92 252K    2  Closed nondisplaced fracture of navicular bone of left foot, sequela  S92 255S                   Subjective: Notes being able to walk up to 1 5 miles yesterday with an incline  Notes having increased soreness and throbbing at her foot yesterday  Objective: See treatment diary below      Assessment: Continues to have pain and discomfort with performance of any weight of forefoot with progressive WBing with ladder agility or performance of HR  No discomfort with active DF  Still TTP to medial and anterior compartments  Held heavy WBing activity as a result of yesterdays soreness and worsening of sxxs post warm up   Plan: Continue per plan of care  Precautions: Chronic kidney disease  EPOC: 22  HEP: Access Code: [email or web address]  URL: [email or web address]  Date: 2022  Prepared by: Rosalee Moder    Exercises  · Ankle Circles on Wobble Board in Sitting - 1 x daily - 7 x weekly - 3 sets - 10 reps  · Towel Scrunches - 1 x daily - 7 x weekly - 3 sets - 10 reps  · Arch Lifting - 1 x daily - 7 x weekly - 3 sets - 10 reps  · Standing Weight Shift - 1 x daily - 7 x weekly - 3 sets - 10 reps  · Stride Stance Weight Shift - 1 x daily - 7 x weekly - 3 sets - 10 reps  · Alternating Step Forward with Support - 1 x daily - 7 x weekly - 3 sets - 10 reps  · Tandem Stance - 1 x daily - 7 x weekly - 3 sets - 10 reps  · Standing Heel Raise with Support - 1 x daily - 7 x weekly - 3 sets - 10 reps           Manuals  5   L ankle PROM PF PF WE WE PF PF LG PF PF WE PF   STM L ankle PF PF WE WE PF PF LG PF PF WE PF   IASTM to L ankle PF PF WE WE PF PF LG  PF WE    Re-assess PF    25' 25' 25' 25' 35' 25' 10' 15' 35' 35' 35'                 Neuro Re-Ed              Tandem Floor  1x30"    Foam  2x30" 3x30" Airex  30"x3 ea Airex  30"x3 ea  30"x3 3x30" 3x30"  airex  30"x3    SLS Floor 1x20"    Foam 2x20" Foam 2x20" Airex  30"x3  Airex  30"x3 ea  30x3 3x30" 3x30"  airex  30"x3 2x30"   Foam pad walk       2 laps FWD/LAT       BAPS STD       x20 A/P, circles Bosu SLS 3x30"  nv    Rockerboard balance              Wt shifting AP and ML EC 10x ea on foam 20x ea 20x ea   10x ea        Static stepping anterior and lateral  20x ea 20x ea            SLS on Airex w  opp foot cone taps   3 cone  x10 3 cone   x10 ea    SL sliders 20x  SL sliders 20x    Biodex    10 min 5 min PF AP level 8         NSP march               NSP 90/90 march              NSP heel slides              Ther Ex              Ankle 4 way              Isometric  INV/EV  4 way with manual 20x ea 4 way with manual 20x 5"x10 ea    4 way 5"x20 5x10" 5"x10 ea    TB ankle diagonals           BTB  x20 ea    Ankle circles              Foam rocking AP 20x 30x 30x   30x2  30x2      BOSU squat       2x10 2x10      Wall squat      2x10 2x10 2x10   2x10 5" hold   STD hip 3 way              Step lunges      2x10 2x10       LAQ        x30       SLR        x20 x20 x20    VMO SLR        x20 x20 x20    S/L hip ABD        x20 x20 x20    Leg press     125 lbs 3x5         Bridge walkout        PB 10x       Slant board      x20 TR  x20 TR and HR   x20 TH/HR and mini squat   Side stepping      2 laps GTB  2 laps      Ther Activity              Bike/TM 5 min bike 5 min  6 min 5 min bike 3 min  TM retro  10 min TM retro 3 min 7 min  TM retro 3 min 10 min TM retro 3 min 10 min bike  7 min                  Gait Training                                          Modalities              US

## 2022-06-01 ENCOUNTER — EVALUATION (OUTPATIENT)
Dept: PHYSICAL THERAPY | Facility: CLINIC | Age: 47
End: 2022-06-01
Payer: COMMERCIAL

## 2022-06-01 DIAGNOSIS — S92.255S CLOSED NONDISPLACED FRACTURE OF NAVICULAR BONE OF LEFT FOOT, SEQUELA: ICD-10-CM

## 2022-06-01 DIAGNOSIS — S92.252K CLOSED DISPLACED FRACTURE OF NAVICULAR BONE OF LEFT FOOT WITH NONUNION, SUBSEQUENT ENCOUNTER: Primary | ICD-10-CM

## 2022-06-01 PROCEDURE — 97140 MANUAL THERAPY 1/> REGIONS: CPT | Performed by: PHYSICAL THERAPIST

## 2022-06-01 PROCEDURE — 97110 THERAPEUTIC EXERCISES: CPT | Performed by: PHYSICAL THERAPIST

## 2022-06-01 NOTE — PROGRESS NOTES
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 Re-assessment 22:  Zabrina has attended 9 visits since the initiation of PT and reports a 50% GROC  Clinically demonstrates improved L ankle ROM, strength and reduced effusion despite stress fx set back ~ 1 month ago  Zabrina demonstrates improved L ankle ROM as well as improved L ankle strength in all planes but still highly limited with L ankle PF strength and push off with ambulation leading to antalgic and labored gait  She has also been hindered from continued 4 th metatarsal stress and discomfort leading to continued pain and immobilization  Due to these above limitations she will continue to benefit from skilled OPPT to address her remaining impairments, achieve established goals and return to PLOF pain-free       Re-assessment 22:  Zabrina has attended 21 visits since the initiation of PT and reports a 70% GROC  Clinically demonstrates normal L ankle ROM in all planes and continued improvement in L ankle strength now with limited pain with ambulation and improved balance in all planes  She continues to have hypersensitivity at dorsal foot and metatarsalgia discomfort leading to pain with ADLs and exercise  This suggests the need for continued skilled OPPT to address her remaining impairments, achieve established goals and return to PLOF pain-free  Re-assessment 6/1/22:  Zabrina has attended 24 visits since the initiation of PT and reports a 75% GROC  Clinically demonstrates improved L ankle ROM to WNL, improved L ankle strength with WNL and improved ambulation endurance, she continues to have pain with negotiating up/down hills but wishes to continue with PT to address her remaining functional deficits in order to return to pain-free ADLs and ambulation     Impairments: abnormal coordination, abnormal muscle firing, abnormal or restricted ROM, abnormal movement, activity intolerance, difficulty understanding, lacks appropriate home exercise program, pain with function, scapular dyskinesis, weight-bearing intolerance and poor posture     Symptom irritability: moderateUnderstanding of Dx/Px/POC: good   Prognosis: good    Goals  Short Term Goals (4 weeks)  1 ) Establish independence with HEP- MET  2 ) Decrease subjective pain levels from NPRS at least to 2-5/10 at rest and with activity- partially met   3 ) Improve L ankle ROM at least 5-10 degrees into all planes to allow for improved ease of movement with less guarding- MET    Long Term Goals (8 weeks)  1 ) Improve L ankle ROM to WNL in all planes to restore normal movement with ADLs and function- partially MET  2 ) Improve L ankle strength to 5/5 in all planes in order to return to pain-free ADLs and function- Partially MET  3 ) Improve FOTO score at least to 75 points showing improved self reported disability - not met  4 ) Reduce L ankle effusion at least  5 to 1 cm - MET  5 ) Improve lateral step down to <2/6 showing good CKC stability and functional strength- new    Plan  Plan details: Continue POC for L ankle ROM, proprioception and progressive WBing; monitor sxs and progress as able  Patient would benefit from: skilled physical therapy and PT eval  Planned modality interventions: cryotherapy, biofeedback, electrical stimulation/Russian stimulation and TENS  Planned therapy interventions: abdominal trunk stabilization, activity modification, ADL retraining, ADL training, balance, balance/weight bearing training, behavior modification, IADL retraining, joint mobilization, manual therapy, motor coordination training, muscle pump exercises, neuromuscular re-education, orthotic management and training, patient education, postural training, self care, sensory integrative techniques, strengthening, stretching, therapeutic activities, therapeutic exercise, therapeutic training, graded exercise, graded activity, gait training, functional ROM exercises, flexibility, coordination, body mechanics training, graded motor and home exercise program  Frequency: 2x week  Duration in visits: 16  Duration in weeks: 8  Plan of Care beginning date: 6/1/2022  Plan of Care expiration date: 8/3/2022  Treatment plan discussed with: patient        Subjective Evaluation    History of Present Illness  Date of onset: 5/1/2021  Date of surgery: 12/27/2021  Mechanism of injury: Mortimer Clan is a 40 y o  female who presents with increased L navicular fracture starting ~ 9 months ago with after falling down stairs with an inversion sprain  Was able to walk initially  Notes [facility] and was (-) for malleolar and was then referred to Ortho and sent to [facility] PT -2 months without improvement  Tried manage conservatively for a few weeks in a boot without improvement    Then was referred for surgical consult in November and decided for 12/27/22 for surgery  Had a lateral ankle reconstruction as a result of + anterior drawer  Was sent home the same day  Post surgical splint for 3 weeks then hard cast for 4 weeks NWBing which was removed on 2/11/22  Now 50% WBing then progression to WBAT  Had F/U with MD on 2/11/22  Denies any night pain or changes in bowel or bladder function  Denies any NT signs or sxs aside from incisional pain  F/U with MD in 6 weeks  Wishes to return to PLOF pain-free walking at the gym and perform basic ADLs  Re-assessment 4/6/22:  Zabrina has attended 9 visits since the initiation of PT and reports a 50% GROC  Reports improved L ankle mobility but still with hard stop at lateral region of ankle with active INV  Notes feeling improved stability in her ankle overall  Reports having difficulty with stairs due to mobility deficits and has resorted to going down side ways and limited ability to go up and down hills/inclines  Still having pain and discomfort with walking due to 4th met stress fx- still waiting for clearance from MD to progress from surgical shoe on 4/8/22  PSFS:  Push band equipment- 1/10; Wear high heels 0/10; Walk a mile:  1/30 score for PSFS  Re-assessment 5/18/22:  Zabrina has attended 21 visits since the initiation of PT and reports a 70% GROC  Reports continued difficulty with going up large hills able to perform   25 of a mile  Able to walk on flat surfaces up to 15 minutes  Still getting anterior dorsal pain as well as posterior medial as well as anterolateral ankle pain with hill  Still with posterior tib pain  Continues report difficulty with mobility going up stairs, notes feeling uncomfortable with going down stairs as well as anterior/medial knee pain with going down stairs  Reports no F/U with MD at this point  Wishes to continue with OPPT  PSFS: Push band equipment 2/10;  Wear high feels 0/10; walking a mile 2 5/10; Stairs 5/10:  14 5/40 score Re-assessment 22:  Zabrina has attended 24 visits since the initiation of PT and reports a 75% GROC  Reports a little improvement in strength and reduce pain  Still pain with walking bare foot at medial arch  Has been able to walk 1 5 miles but with pain and discomfort after  Pain is her main limiter  PSFS: Band equipment 2/10; Mile walk 8/10; Stairs 6 5/10; Wearing high heels 2/10  Score 18 5/40           Not a recurrent problem   Quality of life: good    Pain  Current pain ratin  At best pain ratin  At worst pain ratin  Location: L ankle  Quality: sharp, pulling and dull ache  Relieving factors: rest, support, heat and medications  Aggravating factors: walking, standing and sitting  Progression: worsening    Social Support  Steps to enter house: yes  2  Stairs in house: yes   15  Lives in: multiple-level home  Lives with: spouse and young children    Employment status: not working  Exercise history: Gym occasionally   Life stress: No       Diagnostic Tests  X-ray: abnormal  Treatments  Previous treatment: physical therapy  Current treatment: immobilization and physical therapy  Patient Goals  Patient goals for therapy: decreased edema, decreased pain, improved balance, increased motion, increased strength, independence with ADLs/IADLs and return to sport/leisure activities  Patient goal: Get back to regular life        Objective     Active Range of Motion   Left Ankle/Foot   Dorsiflexion (kf): 25 degrees   Plantar flexion: 59 degrees   Inversion: 38 degrees   Eversion: 15 degrees     Right Ankle/Foot   Dorsiflexion (kf): 17 degrees   Plantar flexion: 55 degrees   Inversion: 35 degrees   Eversion: 21 degrees     Strength/Myotome Testing     Left Ankle/Foot   Dorsiflexion: 5  Plantar flexion: 5  Inversion: 4+ (Still pain )  Eversion: 5    Right Ankle/Foot   Dorsiflexion: 5  Plantar flexion: 5  Inversion: 5  Eversion: 5    Tests     Right Ankle/Foot   Negative for anterior drawer       Swelling Left Ankle/Foot   Figure 8: 49 cm  Malleoli: 24 cm    Right Ankle/Foot   Figure 8: 49 5 cm  Malleoli: 22 cm    Functional Assessment      Squat    Left valgus and trunk lean right  Single Leg Stance   Left: 30 seconds  Right: 30 seconds    General Comments: Ankle/Foot Comments   Mid calf girth ; 35 cm L vs 38 cm R              Precautions: Chronic kidney disease  EPOC: 4/21/22  HEP: Access Code: [email or web address]  URL: [email or web address]  Date: 02/17/2022  Prepared by: April Solid    Exercises  · Ankle Circles on Wobble Board in Sitting - 1 x daily - 7 x weekly - 3 sets - 10 reps  · Towel Scrunches - 1 x daily - 7 x weekly - 3 sets - 10 reps  · Arch Lifting - 1 x daily - 7 x weekly - 3 sets - 10 reps  · Standing Weight Shift - 1 x daily - 7 x weekly - 3 sets - 10 reps  · Stride Stance Weight Shift - 1 x daily - 7 x weekly - 3 sets - 10 reps  · Alternating Step Forward with Support - 1 x daily - 7 x weekly - 3 sets - 10 reps  · Tandem Stance - 1 x daily - 7 x weekly - 3 sets - 10 reps  · Standing Heel Raise with Support - 1 x daily - 7 x weekly - 3 sets - 10 reps           Manuals 5/25 6/1 4/21 4/25 4/27 5/4 5/6 5/9 5/11 5/16 5/18   L ankle PROM PF PF WE WE PF PF LG PF PF WE PF   STM L ankle PF PF WE WE PF PF LG PF PF WE PF   IASTM to L ankle PF PF WE WE PF PF LG  PF WE    Re-assess  PF         PF    25' 25' 25' 25' 35' 25' 10' 15' 35' 35' 35'                 Neuro Re-Ed              Tandem Floor  1x30"    Foam  2x30" 3x30" Airex  30"x3 ea Airex  30"x3 ea  30"x3 3x30" 3x30"  airex  30"x3    SLS Floor 1x20"    Foam 2x20" Foam 2x20" Airex  30"x3  Airex  30"x3 ea  30x3 3x30" 3x30"  airex  30"x3 2x30"   Foam pad walk       2 laps FWD/LAT       BAPS STD       x20 A/P, circles Bosu SLS 3x30"  nv    Rockerboard balance              Wt shifting AP and ML EC 10x ea on foam 20x ea 20x ea   10x ea        Static stepping anterior and lateral  20x ea 20x ea            SLS on Airex w  opp foot cone taps   3 cone  x10 3 cone   x10 ea    SL sliders 20x  SL sliders 20x    Biodex    10 min 5 min PF AP level 8         NSP march               NSP 90/90 march              NSP heel slides              Ther Ex              Ankle 4 way              Isometric  INV/EV  4 way with manual 20x ea 4 way with manual 20x 5"x10 ea    4 way 5"x20 5x10" 5"x10 ea    TB ankle diagonals           BTB  x20 ea    Ankle circles              Foam rocking AP 20x 30x 30x   30x2  30x2      BOSU squat       2x10 2x10      Wall squat      2x10 2x10 2x10   2x10 5" hold   STD hip 3 way              Step lunges      2x10 2x10       LAQ        x30       SLR        x20 x20 x20    VMO SLR        x20 x20 x20    S/L hip ABD        x20 x20 x20    Leg press     125 lbs 3x5         Bridge walkout        PB 10x       Slant board      x20 TR  x20 TR and HR   x20 TH/HR and mini squat   Side stepping      2 laps GTB  2 laps      Ther Activity              Bike/TM 5 min bike 5 min  6 min 5 min bike 3 min  TM retro  10 min TM retro 3 min 7 min  TM retro 3 min 10 min TM retro 3 min 10 min bike  7 min                  Gait Training                                          Modalities              US

## 2022-06-08 ENCOUNTER — OFFICE VISIT (OUTPATIENT)
Dept: PHYSICAL THERAPY | Facility: CLINIC | Age: 47
End: 2022-06-08
Payer: COMMERCIAL

## 2022-06-08 DIAGNOSIS — S92.255S CLOSED NONDISPLACED FRACTURE OF NAVICULAR BONE OF LEFT FOOT, SEQUELA: ICD-10-CM

## 2022-06-08 DIAGNOSIS — S92.252K CLOSED DISPLACED FRACTURE OF NAVICULAR BONE OF LEFT FOOT WITH NONUNION, SUBSEQUENT ENCOUNTER: Primary | ICD-10-CM

## 2022-06-08 PROCEDURE — 97140 MANUAL THERAPY 1/> REGIONS: CPT | Performed by: PHYSICAL THERAPIST

## 2022-06-08 PROCEDURE — 97110 THERAPEUTIC EXERCISES: CPT | Performed by: PHYSICAL THERAPIST

## 2022-06-08 NOTE — PROGRESS NOTES
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, subsequent encounter  S92 252K    2  Closed nondisplaced fracture of navicular bone of left foot, sequela  S92 255S                   Subjective: Notes feeling better with walking, notes walking faster and having less pain overall  Still having trouble with wearing heels and walking inclines  Objective: See treatment diary below      Assessment: Continues to have + response to TFM and IASTM to medial and lateral compartments  Still with TrP and discomfort at post tib insertion with slight effusion  Continues to have discomfort with performance of SL HR  Will continue to benefit from progression with toe walking and lateral toe walking, carioca stepping  Plan: Continue per plan of care  Precautions: Chronic kidney disease  EPOC: 22  HEP: Access Code: [email or web address]  URL: [email or web address]  Date: 2022  Prepared by: Gera Mcpherson    Exercises  · Ankle Circles on Wobble Board in Sitting - 1 x daily - 7 x weekly - 3 sets - 10 reps  · Towel Scrunches - 1 x daily - 7 x weekly - 3 sets - 10 reps  · Arch Lifting - 1 x daily - 7 x weekly - 3 sets - 10 reps  · Standing Weight Shift - 1 x daily - 7 x weekly - 3 sets - 10 reps  · Stride Stance Weight Shift - 1 x daily - 7 x weekly - 3 sets - 10 reps  · Alternating Step Forward with Support - 1 x daily - 7 x weekly - 3 sets - 10 reps  · Tandem Stance - 1 x daily - 7 x weekly - 3 sets - 10 reps  · Standing Heel Raise with Support - 1 x daily - 7 x weekly - 3 sets - 10 reps           Manuals    L ankle PROM PF PF PF WE PF PF LG PF PF WE PF   STM L ankle PF PF PF WE PF PF LG PF PF WE PF   IASTM to L ankle PF PF PF WE PF PF LG  PF WE Re-assess  PF         PF    25' 25' 30' 25' 35' 25' 10' 15' 35' 35' 35'                 Neuro Re-Ed              Tandem Floor  1x30"    Foam  2x30" 3x30"  Airex  30"x3 ea  30"x3 3x30" 3x30"  airex  30"x3    SLS Floor 1x20"    Foam 2x20" Foam 2x20" Foam 30"   Airex  30"x3 ea  30x3 3x30" 3x30"  airex  30"x3 2x30"   Foam pad walk       2 laps FWD/LAT       BAPS STD       x20 A/P, circles Bosu SLS 3x30"  nv    Rockerboard balance              Wt shifting AP and ML EC 10x ea on foam 20x ea    10x ea        Static stepping anterior and lateral  20x ea             SLS on Airex w  opp foot cone taps   10x 3 cone   x10 ea    SL sliders 20x  SL sliders 20x    Biodex    10 min 5 min PF AP level 8         NSP march               NSP 90/90 march              NSP heel slides              Ther Ex              Ankle 4 way              Isometric  INV/EV  4 way with manual 20x ea 4 way with manual 20x 5"x10 ea    4 way 5"x20 5x10" 5"x10 ea    TB ankle diagonals           BTB  x20 ea    Ankle circles              Foam rocking AP 20x 30x    30x2  30x2      BOSU squat       2x10 2x10      Wall squat      2x10 2x10 2x10   2x10 5" hold   STD hip 3 way              Step lunges   Walking 2x20ft   2x10 2x10       LAQ        x30       SLR        x20 x20 x20    VMO SLR        x20 x20 x20    S/L hip ABD        x20 x20 x20    Leg press     125 lbs 3x5         Bridge walkout        PB 10x       Slant board    SL 20x   x20 TR  x20 TR and HR   x20 TH/HR and mini squat   Side stepping   Toes 3 laps   2 laps GTB  2 laps      Ther Activity              Bike/TM 5 min bike 5 min  6 min 14% incline  5 min bike 3 min  TM retro  10 min TM retro 3 min 7 min  TM retro 3 min 10 min TM retro 3 min 10 min bike  7 min    Ladder agility    5 min            Gait Training                                          Modalities              US

## 2022-06-15 ENCOUNTER — OFFICE VISIT (OUTPATIENT)
Dept: PHYSICAL THERAPY | Facility: CLINIC | Age: 47
End: 2022-06-15
Payer: COMMERCIAL

## 2022-06-15 DIAGNOSIS — S92.252K CLOSED DISPLACED FRACTURE OF NAVICULAR BONE OF LEFT FOOT WITH NONUNION, SUBSEQUENT ENCOUNTER: ICD-10-CM

## 2022-06-15 DIAGNOSIS — S92.255S CLOSED NONDISPLACED FRACTURE OF NAVICULAR BONE OF LEFT FOOT, SEQUELA: Primary | ICD-10-CM

## 2022-06-15 PROCEDURE — 97110 THERAPEUTIC EXERCISES: CPT | Performed by: PHYSICAL THERAPIST

## 2022-06-15 PROCEDURE — 97140 MANUAL THERAPY 1/> REGIONS: CPT | Performed by: PHYSICAL THERAPIST

## 2022-06-15 NOTE — PROGRESS NOTES
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, subsequent encounter  S92 252K                   Subjective: Feeling okay, still with medial navicular pain and sharp twinges  Objective: See treatment diary below      Assessment: Continues to have pain and discomfort with resisted INV worse with great toe ext and digit ext vs flex 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 leading to continued post tib disuse or mal-use  Fair response to repeated isometrics and US for desensitization  Able to progress with TM lunge walking without major difficulty  Will progress with strength as able in PFed positions  Plan: Continue per plan of care  Precautions: Chronic kidney disease  EPOC: 22  HEP: Access Code: [email or web address]  URL: [email or web address]  Date: 2022  Prepared by: Sarkis Bautista    Exercises  · Ankle Circles on Wobble Board in Sitting - 1 x daily - 7 x weekly - 3 sets - 10 reps  · Towel Scrunches - 1 x daily - 7 x weekly - 3 sets - 10 reps  · Arch Lifting - 1 x daily - 7 x weekly - 3 sets - 10 reps  · Standing Weight Shift - 1 x daily - 7 x weekly - 3 sets - 10 reps  · Stride Stance Weight Shift - 1 x daily - 7 x weekly - 3 sets - 10 reps  · Alternating Step Forward with Support - 1 x daily - 7 x weekly - 3 sets - 10 reps  · Tandem Stance - 1 x daily - 7 x weekly - 3 sets - 10 reps  · Standing Heel Raise with Support - 1 x daily - 7 x weekly - 3 sets - 10 reps           Manuals 5/25 6/1 6/8 6/15 4/27 5/4 5 5   L ankle PROM PF PF PF PF PF PF LG PF PF WE PF   STM L ankle PF PF PF PF PF PF LG PF PF WE PF   IASTM to L ankle PF PF PF PF PF PF LG  PF WE Re-assess  PF         PF    25' 25' 30' 25' 35' 25' 10' 15' 35' 35' 35'                 Neuro Re-Ed              Tandem Floor  1x30"    Foam  2x30" 3x30"    30"x3 3x30" 3x30"  airex  30"x3    SLS Floor 1x20"    Foam 2x20" Foam 2x20" Foam 30"     30x3 3x30" 3x30"  airex  30"x3 2x30"   Foam pad walk       2 laps FWD/LAT       BAPS STD       x20 A/P, circles Bosu SLS 3x30"  nv    Rockerboard balance              Wt shifting AP and ML EC 10x ea on foam 20x ea    10x ea        Static stepping anterior and lateral  20x ea             SLS on Airex w  opp foot cone taps   10x     SL sliders 20x  SL sliders 20x    Biodex     5 min PF AP level 8         NSP march               NSP 90/90 march              NSP heel slides              Ther Ex              Ankle 4 way              Isometric  INV/EV  4 way with manual 20x ea 4 way with manual 20x 5"x10 ea manual    4 way 5"x20 5x10" 5"x10 ea    TB ankle diagonals           BTB  x20 ea    Ankle circles              Foam rocking AP 20x 30x    30x2  30x2      BOSU squat       2x10 2x10      Wall squat      2x10 2x10 2x10   2x10 5" hold   STD hip 3 way              Step lunges   Walking 2x20ft   2x10 2x10       LAQ        x30       SLR        x20 x20 x20    VMO SLR        x20 x20 x20    S/L hip ABD        x20 x20 x20    Leg press     125 lbs 3x5         Bridge walkout        PB 10x       Slant board    SL 20x SL 20x  x20 TR  x20 TR and HR   x20 TH/HR and mini squat   Side stepping   Toes 3 laps   2 laps GTB  2 laps      Ther Activity              Bike/TM 5 min bike 5 min  6 min 14% incline  5 min 5% incline  TM   10 min TM retro 3 min 7 min  TM retro 3 min 10 min TM retro 3 min 10 min bike  7 min    Ladder agility    5 min            Gait Training                                          Modalities              US    10 min

## 2022-06-22 ENCOUNTER — OFFICE VISIT (OUTPATIENT)
Dept: PHYSICAL THERAPY | Facility: CLINIC | Age: 47
End: 2022-06-22
Payer: COMMERCIAL

## 2022-06-22 DIAGNOSIS — S92.252K CLOSED DISPLACED FRACTURE OF NAVICULAR BONE OF LEFT FOOT WITH NONUNION, SUBSEQUENT ENCOUNTER: ICD-10-CM

## 2022-06-22 DIAGNOSIS — S92.255S CLOSED NONDISPLACED FRACTURE OF NAVICULAR BONE OF LEFT FOOT, SEQUELA: Primary | ICD-10-CM

## 2022-06-22 PROCEDURE — 97140 MANUAL THERAPY 1/> REGIONS: CPT | Performed by: PHYSICAL THERAPIST

## 2022-06-22 PROCEDURE — 97110 THERAPEUTIC EXERCISES: CPT | Performed by: PHYSICAL THERAPIST

## 2022-06-22 NOTE — PROGRESS NOTES
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, subsequent encounter  S92 252K                   Subjective: notes feeling discomfort with walking in heels this past weekend  Objective: See treatment diary below      Assessment: Still with pain and discomfort with resisted ankle INV  Continues to have pain in PF'ed position with pivoting to the L at lateral ankle  With decline TM walking has increased discomfort with landing heel and maintaining PF'ed positions  Will continuewith strengthen as able  Plan: Continue per plan of care  Precautions: Chronic kidney disease  EPOC: 22  HEP: Access Code: [email or web address]  URL: [email or web address]  Date: 2022  Prepared by: Saqib Palacios    Exercises  · Ankle Circles on Wobble Board in Sitting - 1 x daily - 7 x weekly - 3 sets - 10 reps  · Towel Scrunches - 1 x daily - 7 x weekly - 3 sets - 10 reps  · Arch Lifting - 1 x daily - 7 x weekly - 3 sets - 10 reps  · Standing Weight Shift - 1 x daily - 7 x weekly - 3 sets - 10 reps  · Stride Stance Weight Shift - 1 x daily - 7 x weekly - 3 sets - 10 reps  · Alternating Step Forward with Support - 1 x daily - 7 x weekly - 3 sets - 10 reps  · Tandem Stance - 1 x daily - 7 x weekly - 3 sets - 10 reps  · Standing Heel Raise with Support - 1 x daily - 7 x weekly - 3 sets - 10 reps           Manuals  6/8 6/15 6/22 5/4 56 5/ 5   L ankle PROM PF PF PF PF PF PF LG PF PF WE PF   STM L ankle PF PF PF PF PF PF LG PF PF WE PF   IASTM to L ankle PF PF PF PF PF PF LG  PF WE    Re-assess  PF         PF    25' 25' 30' 25' 35' 25' 10' 15' 35' 35' 35'                 Neuro Re-Ed              Tandem Floor  1x30"    Foam  2x30" 3x30"    30"x3 3x30" 3x30"  airex  30"x3    SLS Floor 1x20"    Foam 2x20" Foam 2x20" Foam 30"     30x3 3x30" 3x30"  airex  30"x3 2x30"   Foam pad walk       2 laps FWD/LAT       BAPS STD       x20 A/P, circles Bosu SLS 3x30"  nv    Rockerboard balance              Wt shifting AP and ML EC 10x ea on foam 20x ea    10x ea        Static stepping anterior and lateral  20x ea             SLS on Airex w  opp foot cone taps   10x     SL sliders 20x  SL sliders 20x    Biodex              NSP march               NSP 90/90 march              NSP heel slides              Ther Ex              Ankle 4 way              Isometric  INV/EV  4 way with manual 20x ea 4 way with manual 20x 5"x10 ea manual 5x10" manual   4 way 5"x20 5x10" 5"x10 ea    TB ankle diagonals           BTB  x20 ea    Ankle circles              Foam rocking AP 20x 30x    30x2  30x2      BOSU squat       2x10 2x10      Wall squat      2x10 2x10 2x10   2x10 5" hold   STD hip 3 way              Step lunges   Walking 2x20ft   2x10 2x10       LAQ        x30       SLR        x20 x20 x20    VMO SLR        x20 x20 x20    S/L hip ABD        x20 x20 x20    Leg press              Bridge walkout        PB 10x       Slant board    SL 20x SL 20x 20x ea PF and Df x20 TR  x20 TR and HR   x20 TH/HR and mini squat   Side stepping   Toes 3 laps  PF'ed 2 laps GTB  2 laps      Ther Activity              Bike/TM 5 min bike 5 min  6 min 14% incline  5 min 5% incline  TM  15% incline retro 5 min  10 min TM retro 3 min 7 min  TM retro 3 min 10 min TM retro 3 min 10 min bike  7 min    Ladder agility    5 min            Gait Training                                          Modalities              US    10 min

## 2022-06-29 ENCOUNTER — OFFICE VISIT (OUTPATIENT)
Dept: PHYSICAL THERAPY | Facility: CLINIC | Age: 47
End: 2022-06-29
Payer: COMMERCIAL

## 2022-06-29 DIAGNOSIS — S92.252K CLOSED DISPLACED FRACTURE OF NAVICULAR BONE OF LEFT FOOT WITH NONUNION, SUBSEQUENT ENCOUNTER: ICD-10-CM

## 2022-06-29 DIAGNOSIS — S92.255S CLOSED NONDISPLACED FRACTURE OF NAVICULAR BONE OF LEFT FOOT, SEQUELA: Primary | ICD-10-CM

## 2022-06-29 PROCEDURE — 97112 NEUROMUSCULAR REEDUCATION: CPT | Performed by: PHYSICAL THERAPIST

## 2022-06-29 PROCEDURE — 97110 THERAPEUTIC EXERCISES: CPT | Performed by: PHYSICAL THERAPIST

## 2022-06-29 NOTE — PROGRESS NOTES
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, subsequent encounter  S92 252K                   Subjective: notes feeling continued pain this weekend at lateral and medial ankles  Continues to have discomfort at dorsal forefoot also with push off  Trial of wearing heels       Objective: See treatment diary below      Assessment: Notes feeling progression of medial and lateral pain with any lateral walking or carioca stepping  Noting with addition of digit flexion and great toe flexion that she has reduced forefoot pain but increased medial or lateral compartment pain  Focus on PF strength without digit substitution as well as ambulation with neutral forefoot and addition of Df strength with digit flexion  Re-assess in 2 weeks  Plan: Progress note during next visit  Precautions: Chronic kidney disease  EPOC: 22  HEP: Access Code: [email or web address]  URL: [email or web address]  Date: 2022  Prepared by: Reuben Garcia    Exercises  · Ankle Circles on Wobble Board in Sitting - 1 x daily - 7 x weekly - 3 sets - 10 reps  · Towel Scrunches - 1 x daily - 7 x weekly - 3 sets - 10 reps  · Arch Lifting - 1 x daily - 7 x weekly - 3 sets - 10 reps  · Standing Weight Shift - 1 x daily - 7 x weekly - 3 sets - 10 reps  · Stride Stance Weight Shift - 1 x daily - 7 x weekly - 3 sets - 10 reps  · Alternating Step Forward with Support - 1 x daily - 7 x weekly - 3 sets - 10 reps  · Tandem Stance - 1 x daily - 7 x weekly - 3 sets - 10 reps  · Standing Heel Raise with Support - 1 x daily - 7 x weekly - 3 sets - 10 reps           Manuals 5/25 6/1 6/8 6/15 6/22 6/29 5/6 5   L ankle PROM PF PF PF PF PF  LG PF PF WE PF   STM L ankle PF PF PF PF PF  LG PF PF WE PF   IASTM to L ankle PF PF PF PF PF  LG  PF WE    Re-assess  PF         PF    25' 25' 30' 25' 35'  10' 15' 35' 35' 35'                 Neuro Re-Ed              Tandem Floor  1x30"    Foam  2x30" 3x30"    30"x3 3x30" 3x30"  airex  30"x3    SLS Floor 1x20"    Foam 2x20" Foam 2x20" Foam 30"     30x3 3x30" 3x30"  airex  30"x3 2x30"   Foam pad walk      3 laps 2 laps FWD/LAT       BAPS STD      20x x20 A/P, circles Bosu SLS 3x30"  nv    Rockerboard balance              Wt shifting AP and ML EC 10x ea on foam 20x ea    10x ea        Static stepping anterior and lateral  20x ea             SLS on Airex w  opp foot cone taps   10x     SL sliders 20x  SL sliders 20x    Biodex              NSP march               NSP 90/90 march              NSP heel slides              Ther Ex              Ankle 4 way              Isometric  INV/EV  4 way with manual 20x ea 4 way with manual 20x 5"x10 ea manual 5x10" manual 5x10"  4 way 5"x20 5x10" 5"x10 ea    TB ankle diagonals           BTB  x20 ea    Ankle circles              Foam rocking AP 20x 30x    30x2  30x2      BOSU squat       2x10 2x10      Wall squat      2x10 2x10 2x10   2x10 5" hold   STD hip 3 way              Step lunges   Walking 2x20ft   2x10 2x10       LAQ        x30       SLR        x20 x20 x20    VMO SLR        x20 x20 x20    S/L hip ABD        x20 x20 x20    Leg press              Bridge walkout        PB 10x       Slant board    SL 20x SL 20x 20x ea PF and Df x20 TR  x20 TR and HR   x20 TH/HR and mini squat   Side stepping   Toes 3 laps  PF'ed 2 laps GTB  2 laps      Ther Activity              Bike/TM 5 min bike 5 min  6 min 14% incline  5 min 5% incline  TM  15% incline retro 5 min  10 min TM retro 3 min 7 min  TM retro 3 min 10 min TM retro 3 min 10 min bike  7 min    Ladder agility    5 min            Gait Training                                          Modalities              US    10 min

## 2022-07-13 ENCOUNTER — OFFICE VISIT (OUTPATIENT)
Dept: PHYSICAL THERAPY | Facility: CLINIC | Age: 47
End: 2022-07-13
Payer: COMMERCIAL

## 2022-07-13 DIAGNOSIS — S92.252K CLOSED DISPLACED FRACTURE OF NAVICULAR BONE OF LEFT FOOT WITH NONUNION, SUBSEQUENT ENCOUNTER: ICD-10-CM

## 2022-07-13 DIAGNOSIS — S92.255S CLOSED NONDISPLACED FRACTURE OF NAVICULAR BONE OF LEFT FOOT, SEQUELA: Primary | ICD-10-CM

## 2022-07-13 PROCEDURE — 97140 MANUAL THERAPY 1/> REGIONS: CPT | Performed by: PHYSICAL THERAPIST

## 2022-07-13 PROCEDURE — 97110 THERAPEUTIC EXERCISES: CPT | Performed by: PHYSICAL THERAPIST

## 2022-07-13 NOTE — PROGRESS NOTES
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, subsequent encounter  S92 252K                   Subjective: Notes feeling the same, less foot pain, more medial and lateral ankle pain  Objective: See treatment diary below      Assessment:       Plan: Continue per plan of care  Precautions: Chronic kidney disease  EPOC: 22  HEP: Access Code: [email or web address]  URL: [email or web address]  Date: 2022  Prepared by: Samina Jones    Exercises  · Ankle Circles on Wobble Board in Sitting - 1 x daily - 7 x weekly - 3 sets - 10 reps  · Towel Scrunches - 1 x daily - 7 x weekly - 3 sets - 10 reps  · Arch Lifting - 1 x daily - 7 x weekly - 3 sets - 10 reps  · Standing Weight Shift - 1 x daily - 7 x weekly - 3 sets - 10 reps  · Stride Stance Weight Shift - 1 x daily - 7 x weekly - 3 sets - 10 reps  · Alternating Step Forward with Support - 1 x daily - 7 x weekly - 3 sets - 10 reps  · Tandem Stance - 1 x daily - 7 x weekly - 3 sets - 10 reps  · Standing Heel Raise with Support - 1 x daily - 7 x weekly - 3 sets - 10 reps           Manuals 5/25 6/1 6/8 6/15 6/22 6/29 7/13 5 5   L ankle PROM PF PF PF PF PF  PF PF PF WE PF   STM L ankle PF PF PF PF PF  PF PF PF WE PF   IASTM to L ankle PF PF PF PF PF  PF  PF WE    Re-assess  PF         PF    25' 25' 30' 25' 35'  25' 15' 35' 35' 35'                 Neuro Re-Ed              Tandem Floor  1x30"    Foam  2x30" 3x30"    30"x3  3x30"  airex  30"x3    SLS Floor 1x20"    Foam 2x20" Foam 2x20" Foam 30"     30x3  3x30"  airex  30"x3 2x30"   Foam pad walk      3 laps        BAPS STD      20x  Bosu SLS 3x30"  nv    Rockerboard balance              Wt shifting AP and ML EC 10x ea on foam 20x ea    10x ea        Static stepping anterior and lateral  20x ea             SLS on Airex w  opp foot cone taps   10x     SL sliders 20x  SL sliders 20x    Biodex              NSP march               NSP 90/90 march NSP heel slides              Ther Ex              Ankle 4 way              Isometric  INV/EV  4 way with manual 20x ea 4 way with manual 20x 5"x10 ea manual 5x10" manual 5x10" 10x5" 4 way 5"x20 5x10" 5"x10 ea    TB ankle diagonals           BTB  x20 ea    Ankle circles              Foam rocking AP 20x 30x    30x2  30x2      BOSU squat        2x10      Wall squat      2x10 2x10 2x10   2x10 5" hold   STD hip 3 way              Step lunges   Walking 2x20ft   2x10 2x10       LAQ        x30       SLR        x20 x20 x20    VMO SLR        x20 x20 x20    S/L hip ABD        x20 x20 x20    Leg press       115# 3x10 Sl and SL HR 20x       Bridge walkout        PB 10x       Slant board    SL 20x SL 20x 20x ea PF and Df x20 TR  x20 TR and HR   x20 TH/HR and mini squat   Side stepping   Toes 3 laps  PF'ed 2 laps GTB 2 laps and carioca 2 laps 2 laps      Ther Activity              Bike/TM 5 min bike 5 min  6 min 14% incline  5 min 5% incline  TM  15% incline retro 5 min  10 min TM retro 3 min 7 min  TM retro 3 min 10 min TM retro 3 min 10 min bike  7 min    Ladder agility    5 min            Gait Training                                          Modalities              US    10 min

## 2022-07-27 ENCOUNTER — EVALUATION (OUTPATIENT)
Dept: PHYSICAL THERAPY | Facility: CLINIC | Age: 47
End: 2022-07-27
Payer: COMMERCIAL

## 2022-07-27 DIAGNOSIS — S92.255S CLOSED NONDISPLACED FRACTURE OF NAVICULAR BONE OF LEFT FOOT, SEQUELA: Primary | ICD-10-CM

## 2022-07-27 DIAGNOSIS — S92.252K CLOSED DISPLACED FRACTURE OF NAVICULAR BONE OF LEFT FOOT WITH NONUNION, SUBSEQUENT ENCOUNTER: ICD-10-CM

## 2022-07-27 PROCEDURE — 97140 MANUAL THERAPY 1/> REGIONS: CPT | Performed by: PHYSICAL THERAPIST

## 2022-07-27 PROCEDURE — 97110 THERAPEUTIC EXERCISES: CPT | Performed by: PHYSICAL THERAPIST

## 2022-07-27 NOTE — PROGRESS NOTES
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 Re-assessment 22:  Zabrina has attended 9 visits since the initiation of PT and reports a 50% GROC  Clinically demonstrates improved L ankle ROM, strength and reduced effusion despite stress fx set back ~ 1 month ago  Zabrina demonstrates improved L ankle ROM as well as improved L ankle strength in all planes but still highly limited with L ankle PF strength and push off with ambulation leading to antalgic and labored gait  She has also been hindered from continued 4 th metatarsal stress and discomfort leading to continued pain and immobilization    Due to these above limitations she will continue to benefit from skilled OPPT to address her remaining impairments, achieve established goals and return to PLOF pain-free  Re-assessment 5/18/22:  Zabrina has attended 21 visits since the initiation of PT and reports a 70% GROC  Clinically demonstrates normal L ankle ROM in all planes and continued improvement in L ankle strength now with limited pain with ambulation and improved balance in all planes  She continues to have hypersensitivity at dorsal foot and metatarsalgia discomfort leading to pain with ADLs and exercise  This suggests the need for continued skilled OPPT to address her remaining impairments, achieve established goals and return to PLOF pain-free  Re-assessment 6/1/22:  Zabrina has attended 24 visits since the initiation of PT and reports a 75% GROC  Clinically demonstrates improved L ankle ROM to WNL, improved L ankle strength with WNL and improved ambulation endurance, she continues to have pain with negotiating up/down hills but wishes to continue with PT to address her remaining functional deficits in order to return to pain-free ADLs and ambulation  Re-assessment 7/27/22:  Zabrina has attended 30 visits since the initiation of PT and reports a 80% GROC  Clinically demonstrates full L ankle ROm and good L ankle strength only with restriction if PF max rep strength as well as INV strength with ankle in neutral positions  She continues to have limited ability to perform exercise and negotiate up and down hills due to continued joint pain/discomfort  This suggests the need for continued skilled OPPT to address her remaining impairments, achieve established goals and return to PLOF pain-free     Impairments: abnormal coordination, abnormal muscle firing, abnormal or restricted ROM, abnormal movement, activity intolerance, difficulty understanding, lacks appropriate home exercise program, pain with function, scapular dyskinesis, weight-bearing intolerance and poor posture     Symptom irritability: moderateUnderstanding of Dx/Px/POC: good   Prognosis: good    Goals  Short Term Goals (4 weeks)  1 ) Establish independence with HEP- MET  2 ) Decrease subjective pain levels from NPRS at least to 2-5/10 at rest and with activity- partially met   3 ) Improve L ankle ROM at least 5-10 degrees into all planes to allow for improved ease of movement with less guarding- MET    Long Term Goals (8 weeks)  1 ) Improve L ankle ROM to WNL in all planes to restore normal movement with ADLs and function- partially MET  2 ) Improve L ankle strength to 5/5 in all planes in order to return to pain-free ADLs and function- Partially MET  3 ) Improve FOTO score at least to 75 points showing improved self reported disability - not met  4 ) Reduce L ankle effusion at least  5 to 1 cm - MET  5 ) Improve lateral step down to <2/6 showing good CKC stability and functional strength- new  6 ) Improve SL 1 RM heel raise to > 90%: current 155/225 lbs    Plan  Plan details: Continue POC for L ankle ROM, proprioception and progressive WBing; monitor sxs and progress as able  Patient would benefit from: skilled physical therapy and PT eval  Planned modality interventions: cryotherapy, biofeedback, electrical stimulation/Russian stimulation and TENS  Planned therapy interventions: abdominal trunk stabilization, activity modification, ADL retraining, ADL training, balance, balance/weight bearing training, behavior modification, IADL retraining, joint mobilization, manual therapy, motor coordination training, muscle pump exercises, neuromuscular re-education, orthotic management and training, patient education, postural training, self care, sensory integrative techniques, strengthening, stretching, therapeutic activities, therapeutic exercise, therapeutic training, graded exercise, graded activity, gait training, functional ROM exercises, flexibility, coordination, body mechanics training, graded motor and home exercise program  Frequency: 2x week  Duration in visits: 16  Duration in weeks: 8  Plan of Care beginning date: 7/27/2022  Plan of Care expiration date: 9/28/2022  Treatment plan discussed with: patient        Subjective Evaluation    History of Present Illness  Date of onset: 5/1/2021  Date of surgery: 12/27/2021  Mechanism of injury: Gadiel Higgins is a 40 y o  female who presents with increased L navicular fracture starting ~ 9 months ago with after falling down stairs with an inversion sprain  Was able to walk initially  Notes going to [facility] and was (-) for malleolar and was then referred to Ortho and sent to [facility] -2 months without improvement  Tried manage conservatively for a few weeks in a boot without improvement  Then was referred for surgical consult in November and decided for 12/27/22 for surgery  Had a lateral ankle reconstruction as a result of + anterior drawer  Was sent home the same day  Post surgical splint for 3 weeks then hard cast for 4 weeks NWBing which was removed on 2/11/22  Now 50% WBing then progression to WBAT  Had F/U with MD on 2/11/22  Denies any night pain or changes in bowel or bladder function  Denies any NT signs or sxs aside from incisional pain  F/U with MD in 6 weeks  Wishes to return to PLOF pain-free walking at the gym and perform basic ADLs  Re-assessment 4/6/22:  Zbarina has attended 9 visits since the initiation of PT and reports a 50% GROC  Reports improved L ankle mobility but still with hard stop at lateral region of ankle with active INV  Notes feeling improved stability in her ankle overall  Reports having difficulty with stairs due to mobility deficits and has resorted to going down side ways and limited ability to go up and down hills/inclines  Still having pain and discomfort with walking due to 4th met stress fx- still waiting for clearance from MD to progress from surgical shoe on 4/8/22  PSFS:  Push band equipment- 1/10; Wear high heels 0/10; Walk a mile:  1/30 score for PSFS          Re-assessment 5/18/22:  Zabrina has attended 21 visits since the initiation of PT and reports a 70% GROC  Reports continued difficulty with going up large hills able to perform   25 of a mile  Able to walk on flat surfaces up to 15 minutes  Still getting anterior dorsal pain as well as posterior medial as well as anterolateral ankle pain with hill  Still with posterior tib pain  Continues report difficulty with mobility going up stairs, notes feeling uncomfortable with going down stairs as well as anterior/medial knee pain with going down stairs  Reports no F/U with MD at this point  Wishes to continue with OPPT  PSFS: Push band equipment 2/10; Wear high feels 0/10; walking a mile 2 5/10; Stairs 510:  14  score     Re-assessment 22:  Zabrina has attended 24 visits since the initiation of PT and reports a 75% GROC  Reports a little improvement in strength and reduce pain  Still pain with walking bare foot at medial arch  Has been able to walk 1 5 miles but with pain and discomfort after  Pain is her main limiter  PSFS: Band equipment 2/10; Mile walk 810; Stairs 6 5/10; Wearing high heels 210  Score 18      Re-assessment 22:  Zabrina has attended 30 visits since the initiation of PT and reports a 80% GROC  Reports improved ability to raise up on toes to grab things and improved ability to go up steps; still having compensations with going down stairs  Notes getting continued pain at medial and lateral maleolus as well as forefoot discomfort due to continued feeling of "stuckness" with descent  Reports improved ability to go up hill but walking with lateral walk going up a 45 degree hill  Notes good balance  Has disturbed sleep but attributes to other issues  Reports her scars are more sensitive and needing to use a pillow under foot    PSFS:  Band- 3/10; mile 8/10; stairs 6 5; high heels 3/10:  20  score           Not a recurrent problem   Quality of life: good    Pain  Current pain rating: 3  At best pain ratin  At worst pain ratin  Location: L ankle  now lateral and medial malleolus   Quality: sharp, pulling and dull ache  Relieving factors: rest, support, heat and medications  Aggravating factors: walking, standing and sitting  Progression: worsening    Social Support  Steps to enter house: yes  2  Stairs in house: yes   15  Lives in: multiple-level home  Lives with: spouse and young children    Employment status: not working  Exercise history: Gym occasionally   Life stress: No       Diagnostic Tests  X-ray: abnormal  Treatments  Previous treatment: physical therapy  Current treatment: immobilization and physical therapy  Patient Goals  Patient goals for therapy: decreased edema, decreased pain, improved balance, increased motion, increased strength, independence with ADLs/IADLs and return to sport/leisure activities  Patient goal: Get back to regular life        Objective     Active Range of Motion   Left Ankle/Foot   Dorsiflexion (kf): 25 degrees   Plantar flexion: 65 degrees   Inversion: 38 degrees   Eversion: 17 degrees     Right Ankle/Foot   Dorsiflexion (kf): 17 degrees   Plantar flexion: 55 degrees   Inversion: 35 degrees   Eversion: 21 degrees     Strength/Myotome Testing     Left Ankle/Foot   Dorsiflexion: 5  Plantar flexion: 5  Inversion: 4+ (Still pain )  Eversion: 5    Right Ankle/Foot   Dorsiflexion: 5  Plantar flexion: 5  Inversion: 5  Eversion: 5    Additional Strength Details  SL 1 RM plantar flexor strength:  R 225 lbs; L 155 lbs ~ 72%    Tests     Right Ankle/Foot   Negative for anterior drawer  Swelling   Left Ankle/Foot   Figure 8: 49 cm  Malleoli: 24 cm    Right Ankle/Foot   Figure 8: 49 5 cm  Malleoli: 22 cm    Functional Assessment      Squat    Left valgus and trunk lean right  Single Leg Stance   Left: 30 seconds  Right: 30 seconds    General Comments:       Ankle/Foot Comments   Mid calf girth ; 35 cm L vs 38 cm R              Precautions: Chronic kidney disease  EPOC: 4/21/22  HEP: Access Code: [email or web address]  URL: [email or web address]  Date: 02/17/2022  Prepared by: Harriette Kussmaul    Exercises  · Ankle Circles on Wobble Board in Sitting - 1 x daily - 7 x weekly - 3 sets - 10 reps  · Towel Scrunches - 1 x daily - 7 x weekly - 3 sets - 10 reps  · Arch Lifting - 1 x daily - 7 x weekly - 3 sets - 10 reps  · Standing Weight Shift - 1 x daily - 7 x weekly - 3 sets - 10 reps  · Stride Stance Weight Shift - 1 x daily - 7 x weekly - 3 sets - 10 reps  · Alternating Step Forward with Support - 1 x daily - 7 x weekly - 3 sets - 10 reps  · Tandem Stance - 1 x daily - 7 x weekly - 3 sets - 10 reps  · Standing Heel Raise with Support - 1 x daily - 7 x weekly - 3 sets - 10 reps           Manuals 5/25 6/1 6/8 6/15 6/22 6/29 7/13 7/27 5/11 5/16 5/18   L ankle PROM PF PF PF PF PF  PF PF PF WE PF   STM L ankle PF PF PF PF PF  PF PF PF WE PF   IASTM to L ankle PF PF PF PF PF  PF PF PF WE    Re-assess  PF      PF   PF    25' 25' 30' 25' 35'  25' 35' 35' 35' 35'                 Neuro Re-Ed              Tandem Floor  1x30"    Foam  2x30" 3x30"    30"x3  3x30"  airex  30"x3    SLS Floor 1x20"    Foam 2x20" Foam 2x20" Foam 30"     30x3  3x30"  airex  30"x3 2x30"   Foam pad walk      3 laps        BAPS STD      20x    nv    Rockerboard balance              Wt shifting AP and ML EC 10x ea on foam 20x ea    10x ea        Static stepping anterior and lateral  20x ea             SLS on Airex w  opp foot cone taps   10x       SL sliders 20x    Biodex              NSP march               NSP 90/90 march              NSP heel slides              Ther Ex              Ankle 4 way              Isometric  INV/EV  4 way with manual 20x ea 4 way with manual 20x 5"x10 ea manual 5x10" manual 5x10" 10x5" 4 way 5"x20 manual 5x10" 5"x10 ea    TB ankle diagonals           BTB  x20 ea    Ankle circles              Foam rocking AP 20x 30x    30x2        BOSU squat              Wall squat      2x10 2x10    2x10 5" hold   STD hip 3 way              Step lunges Walking 2x20ft   2x10 2x10       LAQ              SLR         x20 x20    VMO SLR         x20 x20    S/L hip ABD         x20 x20    Leg press       115# 3x10 Sl and SL HR 20x SL 1 RM R 225 lbs ; L 155 lbs       Bridge walkout               Slant board    SL 20x SL 20x 20x ea PF and Df x20 TR     x20 TH/HR and mini squat   Side stepping   Toes 3 laps  PF'ed 2 laps GTB 2 laps and carioca 2 laps       Ther Activity              Bike/TM 5 min bike 5 min  6 min 14% incline  5 min 5% incline  TM  15% incline retro 5 min  10 min TM retro 3 min 7 min  TM retro 3 min  10 min bike  7 min    Ladder agility    5 min            Gait Training                                          Modalities              US    10 min

## 2023-01-28 ENCOUNTER — OFFICE VISIT (OUTPATIENT)
Dept: URGENT CARE | Facility: CLINIC | Age: 48
End: 2023-01-28

## 2023-01-28 VITALS
RESPIRATION RATE: 18 BRPM | DIASTOLIC BLOOD PRESSURE: 68 MMHG | HEART RATE: 74 BPM | TEMPERATURE: 98.2 F | SYSTOLIC BLOOD PRESSURE: 118 MMHG | OXYGEN SATURATION: 99 % | HEIGHT: 68 IN | WEIGHT: 194.2 LBS | BODY MASS INDEX: 29.43 KG/M2

## 2023-01-28 DIAGNOSIS — J01.90 ACUTE NON-RECURRENT SINUSITIS, UNSPECIFIED LOCATION: Primary | ICD-10-CM

## 2023-01-28 RX ORDER — FLUTICASONE PROPIONATE 50 MCG
2 SPRAY, SUSPENSION (ML) NASAL DAILY
Qty: 16 G | Refills: 0 | Status: SHIPPED | OUTPATIENT
Start: 2023-01-28

## 2023-01-28 RX ORDER — AMOXICILLIN AND CLAVULANATE POTASSIUM 875; 125 MG/1; MG/1
1 TABLET, FILM COATED ORAL EVERY 12 HOURS SCHEDULED
Qty: 14 TABLET | Refills: 0 | Status: SHIPPED | OUTPATIENT
Start: 2023-01-28 | End: 2023-02-04

## 2023-01-29 NOTE — PATIENT INSTRUCTIONS
Sinusitis   WHAT YOU NEED TO KNOW:   Sinusitis is inflammation or infection of your sinuses  Sinusitis is most often caused by a virus  Acute sinusitis may last up to 12 weeks  Chronic sinusitis lasts longer than 12 weeks  Recurrent sinusitis means you have 4 or more infections in 1 year  DISCHARGE INSTRUCTIONS:   Return to the emergency department if:   You have trouble breathing or wheezing that is getting worse  You have a stiff neck, a fever, or a bad headache  You cannot open your eye  Your eyeball bulges out or you cannot move your eye  You are more sleepy than normal, or you notice changes in your ability to think, move, or talk  You have swelling of your forehead or scalp  Call your doctor if:   You have vision changes, such as double vision  Your eye and eyelid are red, swollen, and painful  Your symptoms do not improve or go away after 10 days  You have nausea and are vomiting  Your nose is bleeding  You have questions or concerns about your condition or care  Medicines: Your symptoms may go away on their own  Your healthcare provider may recommend watchful waiting for up to 10 days before starting antibiotics  You may need any of the following:  Acetaminophen  decreases pain and fever  It is available without a doctor's order  Ask how much to take and how often to take it  Follow directions  Read the labels of all other medicines you are using to see if they also contain acetaminophen, or ask your doctor or pharmacist  Acetaminophen can cause liver damage if not taken correctly  Do not use more than 4 grams (4,000 milligrams) total of acetaminophen in one day  NSAIDs , such as ibuprofen, help decrease swelling, pain, and fever  This medicine is available with or without a doctor's order  NSAIDs can cause stomach bleeding or kidney problems in certain people   If you take blood thinner medicine, always ask your healthcare provider if NSAIDs are safe for you  Always read the medicine label and follow directions  Nasal steroid sprays  may help decrease inflammation in your nose and sinuses  Decongestants  help reduce swelling and drain mucus in the nose and sinuses  They may help you breathe easier  Antihistamines  help dry mucus in the nose and relieve sneezing  Antibiotics  help treat or prevent a bacterial infection  Take your medicine as directed  Contact your healthcare provider if you think your medicine is not helping or if you have side effects  Tell him or her if you are allergic to any medicine  Keep a list of the medicines, vitamins, and herbs you take  Include the amounts, and when and why you take them  Bring the list or the pill bottles to follow-up visits  Carry your medicine list with you in case of an emergency  Self-care:   Rinse your sinuses as directed  Use a sinus rinse device to rinse your nasal passages with a saline (salt water) solution or distilled water  Do not use tap water  This will help thin the mucus in your nose and rinse away pollen and dirt  It will also help reduce swelling so you can breathe normally  Use a humidifier  to increase air moisture in your home  This may make it easier for you to breathe and help decrease your cough  Sleep with your head elevated  Place an extra pillow under your head before you go to sleep to help your sinuses drain  Drink liquids as directed  Ask your healthcare provider how much liquid to drink each day and which liquids are best for you  Liquids will thin the mucus in your nose and help it drain  Avoid drinks that contain alcohol or caffeine  Do not smoke, and avoid secondhand smoke  Nicotine and other chemicals in cigarettes and cigars can make your symptoms worse  Ask your healthcare provider for information if you currently smoke and need help to quit  E-cigarettes or smokeless tobacco still contain nicotine   Talk to your healthcare provider before you use these products  Prevent the spread of germs:   Wash your hands often with soap and water  Wash your hands after you use the bathroom, change a child's diaper, or sneeze  Wash your hands before you prepare or eat food  Stay away from people who are sick  Some germs spread easily and quickly through contact  Follow up with your doctor as directed: You may be referred to an ear, nose, and throat specialist  Write down your questions so you remember to ask them during your visits  © Copyright [employer] 2022 Information is for End User's use only and may not be sold, redistributed or otherwise used for commercial purposes  All illustrations and images included in CareNotes® are the copyrighted property of A D A M , Inc  or [facility] Micheal Godfrey   The above information is an  only  It is not intended as medical advice for individual conditions or treatments  Talk to your doctor, nurse or pharmacist before following any medical regimen to see if it is safe and effective for you

## 2023-01-29 NOTE — PROGRESS NOTES
[facility]        NAME: Manuelito Mac is a 56 y o  female  : 1975    MRN: [id number]  DATE: 2023  TIME: 5:22 PM    Assessment and Plan   Acute non-recurrent sinusitis, unspecified location [J01 90]  1  Acute non-recurrent sinusitis, unspecified location  amoxicillin-clavulanate (AUGMENTIN) 875-125 mg per tablet    fluticasone (FLONASE) 50 mcg/act nasal spray            Patient Instructions     Discussed condition with pt  He/she has acute sinusitis which I will treat with an oral abx, Flonase, and rec hydration, rest, discussed OTC cough/cold meds, and observation  Follow up with PCP in 3-5 days  Proceed to  ER if symptoms worsen  Chief Complaint     Chief Complaint   Patient presents with   • Facial Pain     Pt presents with sinus pressure and pain, post nasal drip, nausea, runny nose, sneezing, bilateral ear fullness, chest tightness x 2 weeks  PT states she was dxd with flu x 2 weeks ago  Fever has resolved but still having sinus symptoms  History of Present Illness       Pt presents with 2 wk hx of persistent runny nose, sneezing, congestion, sinus pain/pressure, PND, nausea, ear pain, chest tightness  Denies fever, chills, V/D, recent COVID exposure but had the flu 2 wks ago at onset of symptoms  Has been managing symptoms conservatively with OTC meds  Review of Systems   Review of Systems   Constitutional: Negative  HENT: Positive for congestion, ear pain, postnasal drip, rhinorrhea, sinus pressure, sinus pain and sneezing  Respiratory: Positive for chest tightness  Cardiovascular: Negative  Gastrointestinal: Positive for nausea  Negative for diarrhea and vomiting  Genitourinary: Negative            Current Medications       Current Outpatient Medications:   •  albuterol (2 5 mg/3 mL) 0 083 % nebulizer solution, Take 2 5 mg by nebulization every 6 (six) hours as needed for wheezing, Disp: , Rfl:   •  amoxicillin-clavulanate (AUGMENTIN) 875-125 mg per tablet, Take 1 tablet by mouth every 12 (twelve) hours for 7 days, Disp: 14 tablet, Rfl: 0  •  Ascorbic Acid (vitamin C) 1000 MG tablet, Take 1,000 mg by mouth daily, Disp: , Rfl:   •  Calcium Carbonate-Vit D-Min (CALCIUM 600+D3 PLUS MINERALS PO), Take by mouth, Disp: , Rfl:   •  cyanocobalamin (VITAMIN B-12) 1000 MCG tablet, Take 1,000 mcg by mouth every 30 (thirty) days, Disp: , Rfl:   •  ergocalciferol (VITAMIN D2) 50,000 units, Take 50,000 Units by mouth, Disp: , Rfl:   •  famotidine (PEPCID) 40 MG tablet, Take 40 mg by mouth daily at bedtime, Disp: , Rfl:   •  fluticasone (FLONASE) 50 mcg/act nasal spray, 2 sprays into each nostril daily, Disp: 16 g, Rfl: 0  •  levonorgestrel (MIRENA) 20 MCG/24HR IUD, 1 each by Intrauterine route once, Disp: , Rfl:   •  zinc gluconate 50 mg tablet, Take 100 mg by mouth daily, Disp: , Rfl:   •  omeprazole (PriLOSEC) 40 MG capsule, Take 40 mg by mouth daily (Patient not taking: Reported on 1/28/2023), Disp: , Rfl:   •  vitamin E, tocopherol, 400 units capsule, Take 400 Units by mouth daily, Disp: , Rfl:   •  vitamin k 100 MCG tablet, Take 100 mcg by mouth daily (Patient not taking: Reported on 1/28/2023), Disp: , Rfl:   •  VITAMINS A D C PO, Take 50,000 Units by mouth in the morning (Patient not taking: Reported on 1/28/2023), Disp: , Rfl:     Current Allergies     Allergies as of 01/28/2023 - Reviewed 01/28/2023   Allergen Reaction Noted   • Morphine Chest Pain 05/14/2018   • Adhesive [medical tape] Blisters 05/14/2018   • Nsaids Other (See Comments) 05/14/2018   • Sulfacetamide Hives 05/14/2018   • Lovenox [enoxaparin] Rash 05/14/2018            The following portions of the patient's history were reviewed and updated as appropriate: allergies, current medications, past family history, past medical history, past social history, past surgical history and problem list      Past Medical History:   Diagnosis Date   • Anemia    • Asthma    • BRCA negative 01/2018    25 gene panel negative   • Chronic kidney disease     MICROSCOPIC HEMATURA   • Fracture     FOOT   • GERD (gastroesophageal reflux disease) anxiety   • History of transfusion 12/2/2017   • PONV (postoperative nausea and vomiting)    • Pseudocholinesterase deficiency    • Renal disorder        Past Surgical History:   Procedure Laterality Date   • APPENDECTOMY  01/13/2014   • BARIATRIC SURGERY  2014    bypass/duodenal switch   • CHOLECYSTECTOMY  2001   • DILATION AND EVACUATION  2012   • ELBOW SURGERY  2019   • MAMMO (HISTORICAL)  02/05/2021   • OOPHORECTOMY Right 08/2017   • [place] RCNSTJ PST TIBL TDN W/EXC ACCESSORY TARSL NAVCLR Left 12/27/2021    Procedure: Camilla Lapping with Oc Hansen;  Surgeon: Laurita Bermudez MD;  Location:  MAIN OR;  Service: Podiatry       Family History   Problem Relation Age of Onset   • Cancer Father         Kidney   • Uterine cancer Maternal Grandmother         Uterine/cervical   • Diabetes Maternal Grandfather    • Cancer Paternal Grandfather         Throat   • Ovarian cancer Maternal Aunt         35s, alive @71   • Breast cancer Paternal Aunt 59   • Colon cancer Neg Hx          Medications have been verified  Objective   /68   Pulse 74   Temp 98 2 °F (36 8 °C)   Resp 18   Ht 5' 8" (1 727 m)   Wt 88 1 kg (194 lb 3 2 oz)   SpO2 99%   BMI 29 53 kg/m²   No LMP recorded  Physical Exam     Physical Exam  Vitals reviewed  Constitutional:       General: She is not in acute distress  Appearance: She is well-developed  HENT:      Right Ear: Hearing, ear canal and external ear normal       Left Ear: Hearing, ear canal and external ear normal       Ears:      Comments: B/L dull TMs  Nose: Mucosal edema (B/L boggy turbinates) and congestion present  Mouth/Throat:      Mouth: Mucous membranes are moist       Pharynx: Posterior oropharyngeal erythema (PND) present  No oropharyngeal exudate  Tonsils: No tonsillar exudate     Cardiovascular:      Rate and Rhythm: Normal rate and regular rhythm  Pulses: Normal pulses  Heart sounds: Normal heart sounds  No murmur heard  Pulmonary:      Effort: Pulmonary effort is normal  No respiratory distress  Breath sounds: Normal breath sounds  Musculoskeletal:      Cervical back: Neck supple  Lymphadenopathy:      Cervical: No cervical adenopathy  Neurological:      Mental Status: She is alert and oriented to person, place, and time

## 2023-03-14 DIAGNOSIS — Z12.31 ENCOUNTER FOR SCREENING MAMMOGRAM FOR BREAST CANCER: ICD-10-CM

## 2023-12-05 ENCOUNTER — OFFICE VISIT (OUTPATIENT)
Dept: OBGYN CLINIC | Facility: CLINIC | Age: 48
End: 2023-12-05
Payer: COMMERCIAL

## 2023-12-05 ENCOUNTER — TELEPHONE (OUTPATIENT)
Dept: OBGYN CLINIC | Facility: CLINIC | Age: 48
End: 2023-12-05

## 2023-12-05 VITALS
BODY MASS INDEX: 29.25 KG/M2 | SYSTOLIC BLOOD PRESSURE: 114 MMHG | WEIGHT: 193 LBS | DIASTOLIC BLOOD PRESSURE: 64 MMHG | HEIGHT: 68 IN

## 2023-12-05 DIAGNOSIS — Z13.71 BRCA NEGATIVE: ICD-10-CM

## 2023-12-05 DIAGNOSIS — N64.4 MASTODYNIA OF LEFT BREAST: Primary | ICD-10-CM

## 2023-12-05 DIAGNOSIS — Z80.3 FH: BREAST CANCER: ICD-10-CM

## 2023-12-05 PROCEDURE — 99213 OFFICE O/P EST LOW 20 MIN: CPT | Performed by: NURSE PRACTITIONER

## 2023-12-05 NOTE — TELEPHONE ENCOUNTER
Patient left v/m reporting breast pain. Spoke with patient and she further reports pain is on left side and the pain has been off and on since October of this year. Patient is agreeable to coming in for appointment. Patient transferred to  to be scheduled. Katie Seals, this is just an fyi for when she comes in.

## 2023-12-05 NOTE — PROGRESS NOTES
Assessment/Plan:  Left breast pain more persistent No palp abnormality  Will check dx mammo and US  May be referred pain from shoulder injury in Sept  Pt overdue for [address] will schedule with Dr Yari Becker       Diagnoses and all orders for this visit:    Mastodynia of left breast  -     Mammo diagnostic left w 3d & cad; Future  -     US breast left limited (diagnostic); Future    FH: breast cancer  -     Mammo diagnostic left w 3d & cad; Future  -     US breast left limited (diagnostic); Future          Subjective:      Patient ID: Laurie Santana is a 53 y.o. female. Here for eval left breast pain  Has been ongoing for a month or 2 outer lateral left breast She is unable to palpate any abnormality The discomfort has become more persistent  She notes she had a shoulder injury in Sept where she jammed her shoulder and thinks may be related. She denies sig caffeine intake She states she feels she has some swelling left arm H/o bariatric surgery with a lot of loose skin Last mammo 2/2023 BIRADS 2  FH breast cancer [place] and ovarian cancer in [place] Pt is BRCA neg in 2018  Last [place] Dr Yari Becker 5/2022         The following portions of the patient's history were reviewed and updated as appropriate: allergies, current medications, past family history, past medical history, past social history, past surgical history, and problem list.    Review of Systems   Constitutional:  Negative for chills and fever. Worsening breast pain lateral breast NO palp mass No erythema          Objective:      /64 (BP Location: Right arm, Patient Position: Sitting, Cuff Size: Standard)   Ht 5' 8" (1.727 m)   Wt 87.5 kg (193 lb)   BMI 29.35 kg/m²          Physical Exam  Vitals and nursing note reviewed. Constitutional:       General: She is not in acute distress. Appearance: Normal appearance. HENT:      Head: Normocephalic and atraumatic. Chest:      Chest wall: No mass or swelling.    Breasts:     Breasts are symmetrical.      Right: No swelling, inverted nipple, mass, nipple discharge, skin change or tenderness. Left: Tenderness present. No swelling, inverted nipple, mass, nipple discharge or skin change. Comments: Left arm appears slightly larger than right   Musculoskeletal:      Cervical back: Neck supple. Lymphadenopathy:      Cervical: No cervical adenopathy. Upper Body:      Right upper body: No supraclavicular or axillary adenopathy. Left upper body: No supraclavicular or axillary adenopathy. Skin:     General: Skin is warm and dry. Neurological:      Mental Status: She is alert.

## 2023-12-08 NOTE — PATIENT INSTRUCTIONS
Left breast pain more persistent No palp abnormality  Will check dx mammo and US  May be referred pain from shoulder injury in Sept  Pt overdue for [address] will schedule with Dr Elliott Henning

## 2024-02-21 PROBLEM — Z01.419 ENCOUNTER FOR GYNECOLOGICAL EXAMINATION (GENERAL) (ROUTINE) WITHOUT ABNORMAL FINDINGS: Status: RESOLVED | Noted: 2022-05-16 | Resolved: 2024-02-21

## 2024-03-08 NOTE — PROGRESS NOTES
"Assessment/Plan:    Encounter for gynecological examination (general) (routine) without abnormal findings  Here for well check, no complaints. Rare spotting with progesterone IUD.  Normal breast and pelvic exams.  Last pap 1/2021 neg/HPV neg; due next time  Mammo order given, last 1/11/24  Colonoscopy recs reviewed, interested in Cologuard. Order placed, aware to check for coverage with insurance prior to submitting.         Diagnoses and all orders for this visit:    Encounter for gynecological examination (general) (routine) without abnormal findings    Encounter for screening mammogram for breast cancer  -     Mammo screening bilateral w 3d & cad; Future    FH: breast cancer  -     Mammo screening bilateral w 3d & cad; Future    Family history of breast cancer - Paternal aunt    BRCA negative - 2018 Negative 25 gene panel with hematology    IUD (intrauterine device) in place - Mirena 12/2017    Colon cancer screening  -     Cologuard    Other orders  -     Discontinue: cyanocobalamin 1,000 mcg/mL;  (Patient not taking: Reported on 3/11/2024)  -     UltiCare Tuberculin Safety Syr 25G X 5/8\" 1 ML MISC; USE TO INJECT B12 AS DIRECTED          Subjective:      Patient ID: Zabrina Rodas is a 48 y.o. female.    HPI Here for well check.    The following portions of the patient's history were reviewed and updated as appropriate: She  has a past medical history of Anemia, Asthma, BRCA negative (01/2018), Chronic kidney disease, Fracture, GERD (gastroesophageal reflux disease) (anxiety), History of transfusion (12/2/2017), PONV (postoperative nausea and vomiting), Pseudocholinesterase deficiency, and Renal disorder.  She   Patient Active Problem List    Diagnosis Date Noted    Family history of ovarian  - Maternal aunt 05/14/2022    BRCA negative - 2018 Negative 25 gene panel with hematology 05/14/2022    Family history of breast cancer - Paternal aunt 05/14/2022    IUD (intrauterine device) in place - Mirena 12/2017 " "05/14/2022    History of bariatric surgery 05/14/2022    Pseudocholinesterase deficiency     PONV (postoperative nausea and vomiting)     GERD (gastroesophageal reflux disease)     Asthma     Closed nondisplaced fracture of navicular bone of left foot 07/22/2021    Chest pain 05/14/2018     She  has a past surgical history that includes Bariatric Surgery (2014); Cholecystectomy (2001); Oophorectomy (Right, 08/2017); Elbow surgery (2019); pr rcnstj pst tibl tdn w/exc accessory tarsl navclr (Left, 12/27/2021); Appendectomy (01/13/2014); Dilation and evacuation (2012); and Mammo (historical) (02/05/2021).  Her family history includes Breast cancer (age of onset: 64) in her paternal aunt; Cancer in her father and paternal grandfather; Diabetes in her maternal grandfather; Ovarian cancer in her maternal aunt; Uterine cancer in her maternal grandmother.  She  reports that she has never smoked. She has never used smokeless tobacco. She reports that she does not drink alcohol and does not use drugs.  Current Outpatient Medications   Medication Sig Dispense Refill    albuterol (2.5 mg/3 mL) 0.083 % nebulizer solution Take 2.5 mg by nebulization every 6 (six) hours as needed for wheezing      Ascorbic Acid (vitamin C) 1000 MG tablet Take 1,000 mg by mouth daily      Calcium Carbonate-Vit D-Min (CALCIUM 600+D3 PLUS MINERALS PO) Take by mouth      cyanocobalamin (VITAMIN B-12) 1000 MCG tablet Take 1,000 mcg by mouth every 30 (thirty) days      ergocalciferol (VITAMIN D2) 50,000 units Take 50,000 Units by mouth      famotidine (PEPCID) 40 MG tablet Take 40 mg by mouth daily at bedtime      levonorgestrel (MIRENA) 20 MCG/24HR IUD 1 each by Intrauterine route once      [employer] Tuberculin Safety Syr 25G X 5/8\" 1 ML MISC USE TO INJECT B12 AS DIRECTED      VITAMINS A D C PO Take 50,000 Units by mouth in the morning      zinc gluconate 50 mg tablet Take 100 mg by mouth daily      vitamin E, tocopherol, 400 units capsule Take 400 " "Units by mouth daily       No current facility-administered medications for this visit.     She is allergic to morphine, adhesive [medical tape], nsaids, sulfacetamide, ibuprofen, and lovenox [enoxaparin]..    Review of Systems  No breast, bladder, bowel changes. No new persistent pain, bloating, early satiety or pelvic pressure  Rare spotting    Objective:      /68 (BP Location: Left arm, Patient Position: Sitting, Cuff Size: Standard)   Ht 5' 8\" (1.727 m)   Wt 82.6 kg (182 lb)   BMI 27.67 kg/m²          Physical Exam    General appearance: no distress, pleasant  Neck: thyroid without nodules or thyromegaly, no palpable adenopathy  Lymph nodes: no palpable adenopathy  Breasts: no masses, nodes or skin changes  Abdomen: soft, non tender, no palpable masses  Pelvic exam: normal external genitalia, urethral meatus normal, vagina without lesions, cervix with iUD string in os and without lesions, uterus small, non tender, no adnexal masses, non tender  Rectal exam: no masses, non tender, RV confirms above    "

## 2024-03-11 ENCOUNTER — ANNUAL EXAM (OUTPATIENT)
Dept: OBGYN CLINIC | Facility: CLINIC | Age: 49
End: 2024-03-11
Payer: COMMERCIAL

## 2024-03-11 VITALS
WEIGHT: 182 LBS | DIASTOLIC BLOOD PRESSURE: 68 MMHG | BODY MASS INDEX: 27.58 KG/M2 | SYSTOLIC BLOOD PRESSURE: 116 MMHG | HEIGHT: 68 IN

## 2024-03-11 DIAGNOSIS — Z12.11 COLON CANCER SCREENING: ICD-10-CM

## 2024-03-11 DIAGNOSIS — Z12.31 ENCOUNTER FOR SCREENING MAMMOGRAM FOR BREAST CANCER: ICD-10-CM

## 2024-03-11 DIAGNOSIS — Z97.5 IUD (INTRAUTERINE DEVICE) IN PLACE: ICD-10-CM

## 2024-03-11 DIAGNOSIS — Z80.3 FH: BREAST CANCER: ICD-10-CM

## 2024-03-11 DIAGNOSIS — Z01.419 ENCOUNTER FOR GYNECOLOGICAL EXAMINATION (GENERAL) (ROUTINE) WITHOUT ABNORMAL FINDINGS: Primary | ICD-10-CM

## 2024-03-11 DIAGNOSIS — Z13.71 BRCA NEGATIVE: ICD-10-CM

## 2024-03-11 DIAGNOSIS — Z80.3 FAMILY HISTORY OF BREAST CANCER: ICD-10-CM

## 2024-03-11 PROCEDURE — 99396 PREV VISIT EST AGE 40-64: CPT | Performed by: OBSTETRICS & GYNECOLOGY

## 2024-03-11 RX ORDER — SYRINGE WITH NEEDLE, 1 ML 25GX1"
SYRINGE, EMPTY DISPOSABLE MISCELLANEOUS
COMMUNITY
Start: 2023-12-22

## 2024-03-11 RX ORDER — CYANOCOBALAMIN 1000 UG/ML
INJECTION, SOLUTION INTRAMUSCULAR; SUBCUTANEOUS
COMMUNITY
Start: 2024-03-08 | End: 2024-03-11 | Stop reason: SDUPTHER

## 2024-03-11 NOTE — ASSESSMENT & PLAN NOTE
Here for well check, no complaints. Rare spotting with progesterone IUD.  Normal breast and pelvic exams.  Last pap 1/2021 neg/HPV neg; due next time  Mammo order given, last 1/11/24  Colonoscopy recs reviewed, interested in Cologuard. Order placed, aware to check for coverage with insurance prior to submitting.

## 2024-03-11 NOTE — PATIENT INSTRUCTIONS
Return to office in one year unless having any problems such as breast changes, bleeding, new persistent pain, new progressive bloating, new problems eating (getting full to quickly) or new constant urinary pressure that does not resolve in one week.

## 2024-12-05 ENCOUNTER — RESULTS FOLLOW-UP (OUTPATIENT)
Dept: OBGYN CLINIC | Facility: CLINIC | Age: 49
End: 2024-12-05

## 2024-12-05 LAB — COLOGUARD RESULT REPORTABLE: NEGATIVE

## 2025-03-04 ENCOUNTER — RESULTS FOLLOW-UP (OUTPATIENT)
Dept: OBGYN CLINIC | Facility: CLINIC | Age: 50
End: 2025-03-04

## 2025-07-07 NOTE — PROGRESS NOTES
Name: Zabrina Rodas      : 1975      MRN: [id number]  Encounter Provider: Mary Mota MD  Encounter Date: 7/10/2025   Encounter department: [facility] [place]  :  Assessment & Plan  Encounter for gynecological examination (general) (routine) without abnormal findings  Here for well check, reports 2 years of chronic fatigue. Seeing hematology, recent labs with them.  Very intermittent spotting with Mirean  No breast or gyn complaints.   Wakes often, some night sweats.     Normal breast and pelvic exams.  Last pap 2021 neg/HPV neg; repeated today  Mammo order given, last 3/4/25 BIRADS 1B  Cologuard 2024 neg, due        Menopausal symptoms  Offered daily progesterone to help with night sweats and possibly improve sleep with the hope that fatigue may improve. Recommend PCP devaluation of chronic fatigue with sleep study, etc.  Orders:    Progesterone 100 MG CAPS; Take 1 capsule by mouth daily at bedtime    IUD (intrauterine device) in place - Mirena 2017  Interested in replacement in 3 months. Uses for contraception, h/o vaginal hemorrhage prior to use.        History of bariatric surgery  Bariatric team recommends dexa, orders given, will contact insurance for coverage.  Orders:    DXA bone density spine hip and pelvis; Future    Encounter for screening mammogram for breast cancer    Orders:    Mammo screening bilateral w 3d and cad; Future    Family history of breast cancer    Orders:    Mammo screening bilateral w 3d and cad; Future    BRCA negative    Orders:    Mammo screening bilateral w 3d and cad; Future    Screening for malignant neoplasm of the cervix    Orders:    IGP, Aptima HPV, Rfx 16/18,45        History of Present Illness   HPI  Zabrina Rodas is a 50 y.o. female who presents for well check.    Review of Systems  See above  No breast, bladder, bowel changes. No new persistent pain, bloating, early satiety or pelvic pressure    Past Medical History   Past Medical  "History[1]  Past Surgical History[2]  Family History[3]   reports that she has never smoked. She has never used smokeless tobacco. She reports that she does not drink alcohol and does not use drugs.  Current Outpatient Medications   Medication Instructions    albuterol 2.5 mg, Every 6 hours PRN    Calcium Carbonate-Vit D-Min (CALCIUM 600+D3 PLUS MINERALS PO) Take by mouth    cyanocobalamin (VITAMIN B-12) 1,000 mcg, Every 30 days    ergocalciferol (VITAMIN D2) 50,000 Units    famotidine (PEPCID) 40 mg, Daily at bedtime    levonorgestrel (MIRENA) 20 MCG/24HR IUD 1 each, Once    Progesterone 100 MG CAPS 1 capsule, Oral, Daily at bedtime    UltiCare Tuberculin Safety Syr 25G X 5/8\" 1 ML MISC     vitamin C 1,000 mg, Daily    vitamin E (tocopherol) 400 Units, Oral, Daily    VITAMINS A D C PO 50,000 Units, Daily    zinc gluconate 100 mg, Daily   Allergies[4]      Objective   /64 (BP Location: Left arm, Patient Position: Sitting, Cuff Size: Standard)   Ht 5' 8\" (1.727 m)   Wt 74.8 kg (164 lb 12.8 oz)   BMI 25.06 kg/m²      Physical Exam  General appearance: no distress, pleasant  Neck: thyroid without nodules or thyromegaly, no palpable adenopathy  Lymph nodes: no palpable adenopathy  Breasts: no masses, nodes or skin changes  Abdomen: soft, non tender, no palpable masses  Pelvic exam: normal external genitalia, urethral meatus normal, vagina without lesions, cervix with IUD string in os and without lesions, uterus small, non tender, no adnexal masses, non tender  Rectal exam: no masses, non tender, RV confirms above       [1]   Past Medical History:  Diagnosis Date    Anemia     Asthma     BRCA negative 01/2018    25 gene panel negative    Chronic kidney disease     MICROSCOPIC HEMATURA    Fracture     FOOT    GERD (gastroesophageal reflux disease) anxiety    History of transfusion 12/2/2017    PONV (postoperative nausea and vomiting)     Pseudocholinesterase deficiency     Renal disorder    [2]   Past Surgical " History:  Procedure Laterality Date    APPENDECTOMY  01/13/2014    BARIATRIC SURGERY  2014    bypass/duodenal switch    CHOLECYSTECTOMY  2001    DILATION AND EVACUATION  2012    ELBOW SURGERY  2019    OOPHORECTOMY Right 08/2017    [place] RCNSTJ PST TIBL TDN W/EXC ACCESSORY TARSL NAVCLR Left 12/27/2021    Procedure: KIDNER PROCEDURE with Modified Brostrom;  Surgeon: James R Lachman, MD;  Location:  MAIN OR;  Service: Podiatry   [3]   Family History  Problem Relation Name Age of Onset    Cancer Father Víctor         Kidney    Uterine cancer Maternal Grandmother Delfina         Uterine/cervical    Cancer Maternal Grandmother Delfina     Diabetes Maternal Grandfather [place]     Cancer Paternal Grandfather Víctor         Throat    Ovarian cancer Maternal Aunt          30s, alive @68    Breast cancer Paternal Aunt  64    Anesthesia problems Brother Víctor     Learning disabilities Brother Víctor     Cancer Paternal Aunt Joselyn     Colon cancer Neg Hx      Colon polyps Neg Hx     [4]   Allergies  Allergen Reactions    Morphine Chest Pain    Adhesive [Medical Tape] Blisters    Nsaids Other (See Comments)     DUE TO RENAL DISEASE    Sulfacetamide Hives    Ibuprofen Other (See Comments)     Other reaction(s): renal disease    Lovenox [Enoxaparin] Rash

## 2025-07-07 NOTE — ASSESSMENT & PLAN NOTE
Here for well check, reports 2 years of chronic fatigue. Seeing hematology, recent labs with them.  Very intermittent spotting with Mirean  No breast or gyn complaints.   Wakes often, some night sweats.     Normal breast and pelvic exams.  Last pap 1/2021 neg/HPV neg; repeated today  Mammo order given, last 3/4/25 BIRADS 1B  Cologuard 11/2024 neg, due 2027

## 2025-07-10 ENCOUNTER — ANNUAL EXAM (OUTPATIENT)
Dept: OBGYN CLINIC | Facility: CLINIC | Age: 50
End: 2025-07-10
Payer: COMMERCIAL

## 2025-07-10 VITALS
DIASTOLIC BLOOD PRESSURE: 64 MMHG | WEIGHT: 164.8 LBS | HEIGHT: 68 IN | SYSTOLIC BLOOD PRESSURE: 112 MMHG | BODY MASS INDEX: 24.98 KG/M2

## 2025-07-10 DIAGNOSIS — Z98.84 HISTORY OF BARIATRIC SURGERY: ICD-10-CM

## 2025-07-10 DIAGNOSIS — Z12.4 SCREENING FOR MALIGNANT NEOPLASM OF THE CERVIX: ICD-10-CM

## 2025-07-10 DIAGNOSIS — Z97.5 IUD (INTRAUTERINE DEVICE) IN PLACE: ICD-10-CM

## 2025-07-10 DIAGNOSIS — Z01.419 ENCOUNTER FOR GYNECOLOGICAL EXAMINATION (GENERAL) (ROUTINE) WITHOUT ABNORMAL FINDINGS: Primary | ICD-10-CM

## 2025-07-10 DIAGNOSIS — Z12.31 ENCOUNTER FOR SCREENING MAMMOGRAM FOR BREAST CANCER: ICD-10-CM

## 2025-07-10 DIAGNOSIS — Z13.71 BRCA NEGATIVE: ICD-10-CM

## 2025-07-10 DIAGNOSIS — N95.1 MENOPAUSAL SYMPTOMS: ICD-10-CM

## 2025-07-10 DIAGNOSIS — Z80.3 FAMILY HISTORY OF BREAST CANCER: ICD-10-CM

## 2025-07-10 PROCEDURE — 99396 PREV VISIT EST AGE 40-64: CPT | Performed by: OBSTETRICS & GYNECOLOGY

## 2025-07-10 RX ORDER — PROGESTERONE 100 MG/1
1 CAPSULE ORAL
Qty: 90 CAPSULE | Refills: 0 | Status: SHIPPED | OUTPATIENT
Start: 2025-07-10

## 2025-07-10 NOTE — PATIENT INSTRUCTIONS
Return to office in 3 months and then one year unless having any problems such as breast changes, bleeding, new persistent pain, new progressive bloating, new problems eating (getting full to quickly) or new constant urinary pressure that does not resolve in one week.    Lubricants to consider - Replens, Astroglide or coconut oil. [employer] Lube is available on line.

## 2025-07-10 NOTE — LETTER
July 10, 2025     VIVIAN Pacheco  [address]  [place] [address]    Patient: Zabrina Rodas   YOB: 1975   Date of Visit: 7/10/2025       Dear Sandra:    Zabrina Rodas was in today for her annual gyn exam. Below are my notes for this visit.    If you have questions, please do not hesitate to call me. I look forward to following your patient along with you.         Sincerely,        Mary Mota MD        CC: No Recipients    Mary Mota MD  7/10/2025  1:38 PM  Sign when Signing Visit  Name: Zabrina Rodas      : 1975      MRN: [id number]  Encounter Provider: Mary Mota MD  Encounter Date: 7/10/2025   Encounter department: [facility] [place]  :  Assessment & Plan  Encounter for gynecological examination (general) (routine) without abnormal findings  Here for well check, reports 2 years of chronic fatigue. Seeing hematology, recent labs with them.  Very intermittent spotting with Mirean  No breast or gyn complaints.   Wakes often, some night sweats.     Normal breast and pelvic exams.  Last pap 2021 neg/HPV neg; repeated today  Mammo order given, last 3/4/25 BIRADS 1B  Cologuard 2024 neg, due        Menopausal symptoms  Offered daily progesterone to help with night sweats and possibly improve sleep with the hope that fatigue may improve. Recommend PCP devaluation of chronic fatigue with sleep study, etc.  Orders:  •  Progesterone 100 MG CAPS; Take 1 capsule by mouth daily at bedtime    IUD (intrauterine device) in place - Mirena 2017  Interested in replacement in 3 months. Uses for contraception, h/o vaginal hemorrhage prior to use.        History of bariatric surgery  Bariatric team recommends dexa, orders given, will contact insurance for coverage.  Orders:  •  DXA bone density spine hip and pelvis; Future    Encounter for screening mammogram for breast cancer    Orders:  •  Mammo screening bilateral w 3d and cad; Future    Family history of  "breast cancer    Orders:  •  Mammo screening bilateral w 3d and cad; Future    BRCA negative    Orders:  •  Mammo screening bilateral w 3d and cad; Future    Screening for malignant neoplasm of the cervix    Orders:  •  IGP, Aptima HPV, Rfx 16/18,45        History of Present Illness  HPI  Zabrina Rodas is a 50 y.o. female who presents for well check.    Review of Systems  See above  No breast, bladder, bowel changes. No new persistent pain, bloating, early satiety or pelvic pressure    Past Medical History  Past Medical History[1]  Past Surgical History[2]  Family History[3]   reports that she has never smoked. She has never used smokeless tobacco. She reports that she does not drink alcohol and does not use drugs.  Current Outpatient Medications   Medication Instructions   • albuterol 2.5 mg, Every 6 hours PRN   • Calcium Carbonate-Vit D-Min (CALCIUM 600+D3 PLUS MINERALS PO) Take by mouth   • cyanocobalamin (VITAMIN B-12) 1,000 mcg, Every 30 days   • ergocalciferol (VITAMIN D2) 50,000 Units   • famotidine (PEPCID) 40 mg, Daily at bedtime   • levonorgestrel (MIRENA) 20 MCG/24HR IUD 1 each, Once   • Progesterone 100 MG CAPS 1 capsule, Oral, Daily at bedtime   • UltiCare Tuberculin Safety Syr 25G X 5/8\" 1 ML MISC    • vitamin C 1,000 mg, Daily   • vitamin E (tocopherol) 400 Units, Oral, Daily   • VITAMINS A D C PO 50,000 Units, Daily   • zinc gluconate 100 mg, Daily   Allergies[4]      Objective  /64 (BP Location: Left arm, Patient Position: Sitting, Cuff Size: Standard)   Ht 5' 8\" (1.727 m)   Wt 74.8 kg (164 lb 12.8 oz)   BMI 25.06 kg/m²      Physical Exam  General appearance: no distress, pleasant  Neck: thyroid without nodules or thyromegaly, no palpable adenopathy  Lymph nodes: no palpable adenopathy  Breasts: no masses, nodes or skin changes  Abdomen: soft, non tender, no palpable masses  Pelvic exam: normal external genitalia, urethral meatus normal, vagina without lesions, cervix with IUD string in os " and without lesions, uterus small, non tender, no adnexal masses, non tender  Rectal exam: no masses, non tender, RV confirms above         [1]  Past Medical History:  Diagnosis Date   • Anemia    • Asthma    • BRCA negative 01/2018    25 gene panel negative   • Chronic kidney disease     MICROSCOPIC HEMATURA   • Fracture     FOOT   • GERD (gastroesophageal reflux disease) anxiety   • History of transfusion 12/2/2017   • PONV (postoperative nausea and vomiting)    • Pseudocholinesterase deficiency    • Renal disorder    [2]  Past Surgical History:  Procedure Laterality Date   • APPENDECTOMY  01/13/2014   • BARIATRIC SURGERY  2014    bypass/duodenal switch   • CHOLECYSTECTOMY  2001   • DILATION AND EVACUATION  2012   • ELBOW SURGERY  2019   • OOPHORECTOMY Right 08/2017   • [place] RCNSTJ PST TIBL TDN W/EXC ACCESSORY TARSL NAVCLR Left 12/27/2021    Procedure: KIDNER PROCEDURE with Modified Brostrom;  Surgeon: James R Lachman, MD;  Location:  MAIN OR;  Service: Podiatry   [3]  Family History  Problem Relation Name Age of Onset   • Cancer Father Víctor         Kidney   • Uterine cancer Maternal Grandmother Delfina         Uterine/cervical   • Cancer Maternal Grandmother Delfina    • Diabetes Maternal Grandfather [place]    • Cancer Paternal Grandfather Víctor         Throat   • Ovarian cancer Maternal Aunt          30s, alive @68   • Breast cancer Paternal Aunt  64   • Anesthesia problems Brother Víctor    • Learning disabilities Brother Víctor    • Cancer Paternal Aunt Joselyn    • Colon cancer Neg Hx     • Colon polyps Neg Hx     [4]  Allergies  Allergen Reactions   • Morphine Chest Pain   • Adhesive [Medical Tape] Blisters   • Nsaids Other (See Comments)     DUE TO RENAL DISEASE   • Sulfacetamide Hives   • Ibuprofen Other (See Comments)     Other reaction(s): renal disease   • Lovenox [Enoxaparin] Rash

## 2025-07-10 NOTE — ASSESSMENT & PLAN NOTE
Interested in replacement in 3 months. Uses for contraception, h/o vaginal hemorrhage prior to use.

## 2025-07-10 NOTE — ASSESSMENT & PLAN NOTE
Bariatric team recommends dexa, orders given, will contact insurance for coverage.  Orders:    DXA bone density spine hip and pelvis; Future

## 2025-07-16 LAB
CYTOLOGIST CVX/VAG CYTO: NORMAL
DX ICD CODE: NORMAL
HPV GENOTYPE REFLEX: NORMAL
HPV I/H RISK 4 DNA CVX QL PROBE+SIG AMP: NEGATIVE
OTHER STN SPEC: NORMAL
PATH REPORT.FINAL DX SPEC: NORMAL
SL AMB NOTE:: NORMAL
SL AMB SPECIMEN ADEQUACY: NORMAL
SL AMB TEST METHODOLOGY: NORMAL

## (undated) DEVICE — ABDOMINAL PAD: Brand: DERMACEA

## (undated) DEVICE — MICRO DUAL CUT (7.0 X 0.38 X 15.0MM)

## (undated) DEVICE — BETHLEHEM UNIVERSAL  MIONR EXT: Brand: CARDINAL HEALTH

## (undated) DEVICE — SUT ETHILON 3-0 PS-1 18 IN 1663H

## (undated) DEVICE — DRESSING XEROFORM 5 X 9

## (undated) DEVICE — INTENDED FOR TISSUE SEPARATION, AND OTHER PROCEDURES THAT REQUIRE A SHARP SURGICAL BLADE TO PUNCTURE OR CUT.: Brand: BARD-PARKER SAFETY BLADES SIZE 15, STERILE

## (undated) DEVICE — SUT VICRYL 4-0 PS-2 18 IN J496G

## (undated) DEVICE — GLOVE INDICATOR PI UNDERGLOVE SZ 8 BLUE

## (undated) DEVICE — TONGUE DEPRESSOR STERILE

## (undated) DEVICE — DRAPE C-ARM 48 X 84 IN F/ OEC MINIVIEW 6800

## (undated) DEVICE — CAST PADDING 4 IN SYNTHETIC NON-STRL

## (undated) DEVICE — DRILL: Brand: SONICFUSION

## (undated) DEVICE — INTENDED FOR TISSUE SEPARATION, AND OTHER PROCEDURES THAT REQUIRE A SHARP SURGICAL BLADE TO PUNCTURE OR CUT.: Brand: BARD-PARKER ® CARBON RIB-BACK BLADES

## (undated) DEVICE — 3M™ IOBAN™ 2 ANTIMICROBIAL INCISE DRAPE 6648EZ: Brand: IOBAN™ 2

## (undated) DEVICE — CAST PADDING 6 IN STERILE

## (undated) DEVICE — CUFF TOURNIQUET 30 X 4 IN QUICK CONNECT DISP 1BLA

## (undated) DEVICE — CURITY STRETCH BANDAGE: Brand: CURITY

## (undated) DEVICE — PENCIL ELECTROSURG E-Z CLEAN -0035H

## (undated) DEVICE — MICRO DUAL CUT (9.0 X 0.38 X 18.5MM)

## (undated) DEVICE — CHLORAPREP HI-LITE 26ML ORANGE

## (undated) DEVICE — 3M™ DURAPORE™ SURGICAL TAPE 1538-1, 1 INCH X 10 YARD (2,5CM X 9,1M), 12 ROLLS/BOX: Brand: 3M™ DURAPORE™

## (undated) DEVICE — GAUZE SPONGES,16 PLY: Brand: CURITY

## (undated) DEVICE — BULB SYRINGE,IRRIGATION WITH PROTECTIVE CAP: Brand: DOVER

## (undated) DEVICE — UNDYED BRAIDED (POLYGLACTIN 910), SYNTHETIC ABSORBABLE SUTURE: Brand: COATED VICRYL

## (undated) DEVICE — SPONGE SCRUB 4 PCT CHLORHEXIDINE

## (undated) DEVICE — MEDI-VAC YANKAUER SUCTION HANDLE W/BULBOUS AND CONTROL VENT: Brand: CARDINAL HEALTH

## (undated) DEVICE — SUT VICRYL 2-0 CT-2 27 IN J269H

## (undated) DEVICE — ACE WRAP 6 IN UNSTERILE

## (undated) DEVICE — SPLINT 5 X 30 IN XFAST SET PLASTER

## (undated) DEVICE — GLOVE SRG BIOGEL 8